# Patient Record
Sex: MALE | Race: WHITE | NOT HISPANIC OR LATINO | Employment: UNEMPLOYED | URBAN - METROPOLITAN AREA
[De-identification: names, ages, dates, MRNs, and addresses within clinical notes are randomized per-mention and may not be internally consistent; named-entity substitution may affect disease eponyms.]

---

## 2017-03-07 ENCOUNTER — HOSPITAL ENCOUNTER (EMERGENCY)
Facility: HOSPITAL | Age: 52
Discharge: HOME | End: 2017-03-07
Admitting: EMERGENCY MEDICINE
Payer: COMMERCIAL

## 2017-03-07 ENCOUNTER — APPOINTMENT (EMERGENCY)
Dept: RADIOLOGY | Facility: HOSPITAL | Age: 52
End: 2017-03-07
Payer: COMMERCIAL

## 2017-03-07 VITALS
SYSTOLIC BLOOD PRESSURE: 150 MMHG | HEART RATE: 75 BPM | DIASTOLIC BLOOD PRESSURE: 97 MMHG | TEMPERATURE: 97.8 F | RESPIRATION RATE: 18 BRPM | WEIGHT: 266 LBS | OXYGEN SATURATION: 98 %

## 2017-03-07 DIAGNOSIS — M77.8 TENDINITIS OF LEFT WRIST: Primary | ICD-10-CM

## 2017-03-07 PROCEDURE — 73110 X-RAY EXAM OF WRIST: CPT

## 2017-03-07 PROCEDURE — 99283 EMERGENCY DEPT VISIT LOW MDM: CPT

## 2017-03-07 RX ORDER — IBUPROFEN 800 MG/1
800 TABLET ORAL EVERY 6 HOURS PRN
Qty: 30 TABLET | Refills: 0 | Status: SHIPPED | OUTPATIENT
Start: 2017-03-07 | End: 2018-11-14

## 2017-03-07 RX ADMIN — IBUPROFEN 800 MG: 200 TABLET, FILM COATED ORAL at 11:24

## 2017-03-13 ENCOUNTER — OFFICE VISIT (OUTPATIENT)
Dept: URGENT CARE | Facility: MEDICAL CENTER | Age: 52
End: 2017-03-13
Payer: COMMERCIAL

## 2017-03-13 PROCEDURE — 99283 EMERGENCY DEPT VISIT LOW MDM: CPT

## 2017-03-13 PROCEDURE — G0382 LEV 3 HOSP TYPE B ED VISIT: HCPCS

## 2018-01-15 NOTE — MISCELLANEOUS
Message   Recorded as Task   Date: 02/22/2016 06:37 PM, Created By: Laxmi Bethea   Task Name: Medical Complaint Callback   Assigned To: Dunia Beebe   Regarding Patient: Rose Mary Hansen, Status: Complete   Comment:    Dunia Beebe - 22 Feb 2016 6:37 PM     TASK CREATED  [phone]  Sister called  He was at his sister Yuki Farah, and she took his BP which was 182/120  He did not want to go to ER  On his way home now  Ramirez Lo - 22 Feb 2016 7:13 PM     TASK REPLIED TO: Previously Assigned To Ramirez Lo  He had again skipped his appt w us recently for f/up and is well aware of risks assoc w this-  stroke etc    If he does not keep appts  (has miised multiple w us) I do not know what else to do for him and he may want to find another Dr who could perhaps get him to become more compliant -  w/ his history and BP that high he should be seen at ER  Dunia Beebe - 22 Feb 2016 7:40 PM     TASK EDITED  Given Dr Aubrey Longoria message and he said he would go to ER in am    Dunia Beebe - 22 Feb 2016 7:41 PM     TASK COMPLETED        Active Problems    1  Abnormal brain scan (794 09) (R94 02)   2  Asthma (493 90) (J45 909)   3  Blurry vision (368 8) (H53 8)   4  Cervical Spondylosis (C5 - C6) (721 0)   5  Depression with anxiety (300 4) (F41 8)   6  Diabetes mellitus (250 00) (E11 9)   7  Headache (784 0) (R51)   8  Herniated cervical disc (722 0) (M50 20)   9  Hypertension (401 9) (I10)   10  Hypokalemia (276 8) (E87 6)   11  Lacunar stroke (434 91) (I63 9)   12  Left inguinal hernia (550 90) (K40 90)   13  Numbness (782 0) (R20 0)   14  Peripheral neuropathy (356 9) (G62 9)   15  Postherpetic neuralgia (053 19) (B02 29)    Current Meds   1  ARIPiprazole 5 MG Oral Tablet; Take 1 tablet daily at night; Therapy: 34VDN9698 to (Last Rx:68Gnv4944) Ordered   2  Carvedilol 6 25 MG Oral Tablet; Take 1 tablet twice a day; Therapy: 71EZT1618 to (Evaluate:90Cuf8487); Last Rx:28Fvh6111 Ordered   3   Lantus 100 UNIT/ML Subcutaneous Solution; INJECT 15 UNIT Daily or as directed; Therapy: 20KJR7216 to (Last Rx:09Feb2016) Ordered   4  Lisinopril 40 MG Oral Tablet; take one tablet by mouth daily; Therapy: 58HUZ8748 to (Evaluate:64Fyd9269); Last Rx:09Feb2016 Ordered   5  LORazepam 0 5 MG Oral Tablet; TAKE 1 TABLET 4 times daily PRN anxiety; Therapy: 63FDM6212 to (Evaluate:13Nlg5697); Last Rx:09Feb2016 Ordered   6  MetFORMIN HCl - 500 MG Oral Tablet; Take 1 tablet twice daily with meals; Therapy: 42MMP7909 to (Evaluate:59Hdp5973); Last Rx:09Feb2016 Ordered   7  Ventolin  (90 Base) MCG/ACT Inhalation Aerosol Solution; INHALE 2 PUFFS   EVERY 4-6 HOURS AS NEEDED; Therapy: 86ZVJ8484 to ([phone] [phone])  Requested for: 33MSU1369; Last   Rx:23Jan2014 Ordered    Allergies    1   No Known Drug Allergies    Signatures   Electronically signed by : Marline Camarillo, ; Feb 22 2016  7:41PM EST                       (Author)

## 2018-11-14 ENCOUNTER — APPOINTMENT (EMERGENCY)
Dept: RADIOLOGY | Facility: HOSPITAL | Age: 53
DRG: 284 | End: 2018-11-14
Payer: COMMERCIAL

## 2018-11-14 ENCOUNTER — HOSPITAL ENCOUNTER (INPATIENT)
Facility: HOSPITAL | Age: 53
LOS: 3 days | Discharge: HOME | DRG: 284 | End: 2018-11-18
Attending: EMERGENCY MEDICINE | Admitting: SURGERY
Payer: COMMERCIAL

## 2018-11-14 DIAGNOSIS — K80.13 CALCULUS OF GALLBLADDER WITH ACUTE ON CHRONIC CHOLECYSTITIS WITH OBSTRUCTION: ICD-10-CM

## 2018-11-14 DIAGNOSIS — K85.90 PANCREATITIS: Primary | ICD-10-CM

## 2018-11-14 DIAGNOSIS — K85.90 ACUTE PANCREATITIS WITHOUT INFECTION OR NECROSIS, UNSPECIFIED PANCREATITIS TYPE: ICD-10-CM

## 2018-11-14 LAB
ALBUMIN SERPL BCP-MCNC: 3.8 G/DL (ref 3.5–5)
ALP SERPL-CCNC: 193 U/L (ref 46–116)
ALT SERPL W P-5'-P-CCNC: 235 U/L (ref 12–78)
AMPHETAMINES SERPL QL SCN: NEGATIVE
ANION GAP SERPL CALCULATED.3IONS-SCNC: 3 MMOL/L (ref 4–13)
AST SERPL W P-5'-P-CCNC: 233 U/L (ref 5–45)
BARBITURATES UR QL: NEGATIVE
BASOPHILS # BLD AUTO: 0.06 THOUSANDS/ΜL (ref 0–0.1)
BASOPHILS NFR BLD AUTO: 0 % (ref 0–1)
BENZODIAZ UR QL: NEGATIVE
BILIRUB SERPL-MCNC: 3.19 MG/DL (ref 0.2–1)
BILIRUB UR QL STRIP: NEGATIVE
BUN SERPL-MCNC: 20 MG/DL (ref 5–25)
CALCIUM SERPL-MCNC: 8.6 MG/DL (ref 8.3–10.1)
CHLORIDE SERPL-SCNC: 99 MMOL/L (ref 100–108)
CLARITY UR: CLEAR
CO2 SERPL-SCNC: 29 MMOL/L (ref 21–32)
COCAINE UR QL: POSITIVE
COLOR UR: YELLOW
CREAT SERPL-MCNC: 1.48 MG/DL (ref 0.6–1.3)
EOSINOPHIL # BLD AUTO: 0.14 THOUSAND/ΜL (ref 0–0.61)
EOSINOPHIL NFR BLD AUTO: 1 % (ref 0–6)
ERYTHROCYTE [DISTWIDTH] IN BLOOD BY AUTOMATED COUNT: 12.2 % (ref 11.6–15.1)
GFR SERPL CREATININE-BSD FRML MDRD: 53 ML/MIN/1.73SQ M
GLUCOSE SERPL-MCNC: 256 MG/DL (ref 65–140)
GLUCOSE UR STRIP-MCNC: ABNORMAL MG/DL
HCT VFR BLD AUTO: 49.1 % (ref 36.5–49.3)
HGB BLD-MCNC: 17 G/DL (ref 12–17)
HGB UR QL STRIP.AUTO: NEGATIVE
IMM GRANULOCYTES # BLD AUTO: 0.07 THOUSAND/UL (ref 0–0.2)
IMM GRANULOCYTES NFR BLD AUTO: 1 % (ref 0–2)
KETONES UR STRIP-MCNC: NEGATIVE MG/DL
LEUKOCYTE ESTERASE UR QL STRIP: NEGATIVE
LIPASE SERPL-CCNC: ABNORMAL U/L (ref 73–393)
LYMPHOCYTES # BLD AUTO: 1.85 THOUSANDS/ΜL (ref 0.6–4.47)
LYMPHOCYTES NFR BLD AUTO: 12 % (ref 14–44)
MCH RBC QN AUTO: 30.5 PG (ref 26.8–34.3)
MCHC RBC AUTO-ENTMCNC: 34.6 G/DL (ref 31.4–37.4)
MCV RBC AUTO: 88 FL (ref 82–98)
METHADONE UR QL: NEGATIVE
MONOCYTES # BLD AUTO: 1.08 THOUSAND/ΜL (ref 0.17–1.22)
MONOCYTES NFR BLD AUTO: 7 % (ref 4–12)
NEUTROPHILS # BLD AUTO: 11.89 THOUSANDS/ΜL (ref 1.85–7.62)
NEUTS SEG NFR BLD AUTO: 79 % (ref 43–75)
NITRITE UR QL STRIP: NEGATIVE
NRBC BLD AUTO-RTO: 0 /100 WBCS
OPIATES UR QL SCN: POSITIVE
PCP UR QL: NEGATIVE
PH UR STRIP.AUTO: 5.5 [PH] (ref 4.5–8)
PLATELET # BLD AUTO: 296 THOUSANDS/UL (ref 149–390)
PMV BLD AUTO: 9.4 FL (ref 8.9–12.7)
POTASSIUM SERPL-SCNC: 4 MMOL/L (ref 3.5–5.3)
PROT SERPL-MCNC: 7.7 G/DL (ref 6.4–8.2)
PROT UR STRIP-MCNC: NEGATIVE MG/DL
RBC # BLD AUTO: 5.58 MILLION/UL (ref 3.88–5.62)
SODIUM SERPL-SCNC: 131 MMOL/L (ref 136–145)
SP GR UR STRIP.AUTO: 1.01 (ref 1–1.03)
THC UR QL: NEGATIVE
TROPONIN I SERPL-MCNC: <0.02 NG/ML
UROBILINOGEN UR QL STRIP.AUTO: 0.2 E.U./DL
WBC # BLD AUTO: 15.09 THOUSAND/UL (ref 4.31–10.16)

## 2018-11-14 PROCEDURE — 74177 CT ABD & PELVIS W/CONTRAST: CPT

## 2018-11-14 PROCEDURE — 36415 COLL VENOUS BLD VENIPUNCTURE: CPT

## 2018-11-14 PROCEDURE — 96361 HYDRATE IV INFUSION ADD-ON: CPT

## 2018-11-14 PROCEDURE — 71275 CT ANGIOGRAPHY CHEST: CPT

## 2018-11-14 PROCEDURE — 96374 THER/PROPH/DIAG INJ IV PUSH: CPT

## 2018-11-14 PROCEDURE — 81003 URINALYSIS AUTO W/O SCOPE: CPT

## 2018-11-14 PROCEDURE — 80307 DRUG TEST PRSMV CHEM ANLYZR: CPT | Performed by: EMERGENCY MEDICINE

## 2018-11-14 PROCEDURE — 85730 THROMBOPLASTIN TIME PARTIAL: CPT | Performed by: EMERGENCY MEDICINE

## 2018-11-14 PROCEDURE — 83690 ASSAY OF LIPASE: CPT | Performed by: EMERGENCY MEDICINE

## 2018-11-14 PROCEDURE — 84484 ASSAY OF TROPONIN QUANT: CPT | Performed by: EMERGENCY MEDICINE

## 2018-11-14 PROCEDURE — 80053 COMPREHEN METABOLIC PANEL: CPT | Performed by: EMERGENCY MEDICINE

## 2018-11-14 PROCEDURE — 85610 PROTHROMBIN TIME: CPT | Performed by: EMERGENCY MEDICINE

## 2018-11-14 PROCEDURE — 96375 TX/PRO/DX INJ NEW DRUG ADDON: CPT

## 2018-11-14 PROCEDURE — 93005 ELECTROCARDIOGRAM TRACING: CPT

## 2018-11-14 PROCEDURE — 99285 EMERGENCY DEPT VISIT HI MDM: CPT

## 2018-11-14 PROCEDURE — 96360 HYDRATION IV INFUSION INIT: CPT

## 2018-11-14 PROCEDURE — 85025 COMPLETE CBC W/AUTO DIFF WBC: CPT | Performed by: EMERGENCY MEDICINE

## 2018-11-14 RX ORDER — CARVEDILOL 25 MG/1
50 TABLET ORAL 2 TIMES DAILY WITH MEALS
COMMUNITY

## 2018-11-14 RX ORDER — ISOSORBIDE MONONITRATE 60 MG/1
60 TABLET, EXTENDED RELEASE ORAL DAILY
COMMUNITY

## 2018-11-14 RX ORDER — ASPIRIN 81 MG/1
81 TABLET ORAL DAILY
COMMUNITY

## 2018-11-14 RX ORDER — ATORVASTATIN CALCIUM 80 MG/1
80 TABLET, FILM COATED ORAL
COMMUNITY

## 2018-11-14 RX ORDER — AMLODIPINE BESYLATE 10 MG/1
10 TABLET ORAL DAILY
COMMUNITY

## 2018-11-14 RX ORDER — SPIRONOLACTONE 50 MG/1
50 TABLET, FILM COATED ORAL DAILY
COMMUNITY

## 2018-11-14 RX ORDER — LISINOPRIL 40 MG/1
40 TABLET ORAL DAILY
COMMUNITY

## 2018-11-14 RX ORDER — ONDANSETRON 2 MG/ML
4 INJECTION INTRAMUSCULAR; INTRAVENOUS ONCE
Status: COMPLETED | OUTPATIENT
Start: 2018-11-14 | End: 2018-11-14

## 2018-11-14 RX ORDER — OMEPRAZOLE 20 MG/1
20 CAPSULE, DELAYED RELEASE ORAL DAILY
COMMUNITY
End: 2021-12-27 | Stop reason: CLARIF

## 2018-11-14 RX ORDER — MIRTAZAPINE 15 MG/1
15 TABLET, FILM COATED ORAL
COMMUNITY

## 2018-11-14 RX ORDER — HYDROMORPHONE HCL/PF 1 MG/ML
1 SYRINGE (ML) INJECTION ONCE
Status: COMPLETED | OUTPATIENT
Start: 2018-11-14 | End: 2018-11-14

## 2018-11-14 RX ORDER — GLYBURIDE 5 MG/1
5 TABLET ORAL
COMMUNITY

## 2018-11-14 RX ORDER — ONDANSETRON 2 MG/ML
4 INJECTION INTRAMUSCULAR; INTRAVENOUS ONCE
Status: DISCONTINUED | OUTPATIENT
Start: 2018-11-15 | End: 2018-11-15

## 2018-11-14 RX ORDER — ALBUTEROL SULFATE 90 UG/1
1 AEROSOL, METERED RESPIRATORY (INHALATION) AS NEEDED
Status: ON HOLD | COMMUNITY
End: 2018-12-18 | Stop reason: HOSPADM

## 2018-11-14 RX ADMIN — SODIUM CHLORIDE 1000 ML: 0.9 INJECTION, SOLUTION INTRAVENOUS at 22:27

## 2018-11-14 RX ADMIN — SODIUM CHLORIDE 1000 ML: 0.9 INJECTION, SOLUTION INTRAVENOUS at 23:44

## 2018-11-14 RX ADMIN — HYDROMORPHONE HYDROCHLORIDE 1 MG: 1 INJECTION, SOLUTION INTRAMUSCULAR; INTRAVENOUS; SUBCUTANEOUS at 23:44

## 2018-11-14 RX ADMIN — IOHEXOL 100 ML: 350 INJECTION, SOLUTION INTRAVENOUS at 22:43

## 2018-11-14 RX ADMIN — ONDANSETRON 4 MG: 2 INJECTION INTRAMUSCULAR; INTRAVENOUS at 22:27

## 2018-11-15 ENCOUNTER — APPOINTMENT (INPATIENT)
Dept: RADIOLOGY | Facility: HOSPITAL | Age: 53
DRG: 284 | End: 2018-11-15
Payer: COMMERCIAL

## 2018-11-15 PROBLEM — K85.90 ACUTE PANCREATITIS: Status: ACTIVE | Noted: 2018-11-15

## 2018-11-15 LAB
ABO GROUP BLD: NORMAL
ALBUMIN SERPL BCP-MCNC: 3 G/DL (ref 3.5–5)
ALP SERPL-CCNC: 157 U/L (ref 46–116)
ALT SERPL W P-5'-P-CCNC: 230 U/L (ref 12–78)
ANION GAP SERPL CALCULATED.3IONS-SCNC: 2 MMOL/L (ref 4–13)
APTT PPP: 31 SECONDS (ref 26–38)
AST SERPL W P-5'-P-CCNC: 167 U/L (ref 5–45)
ATRIAL RATE: 120 BPM
BASOPHILS # BLD AUTO: 0.04 THOUSANDS/ΜL (ref 0–0.1)
BASOPHILS NFR BLD AUTO: 1 % (ref 0–1)
BILIRUB SERPL-MCNC: 0.88 MG/DL (ref 0.2–1)
BLD GP AB SCN SERPL QL: NEGATIVE
BUN SERPL-MCNC: 16 MG/DL (ref 5–25)
CALCIUM SERPL-MCNC: 7.9 MG/DL (ref 8.3–10.1)
CHLORIDE SERPL-SCNC: 105 MMOL/L (ref 100–108)
CO2 SERPL-SCNC: 30 MMOL/L (ref 21–32)
CREAT SERPL-MCNC: 1.42 MG/DL (ref 0.6–1.3)
EOSINOPHIL # BLD AUTO: 0.21 THOUSAND/ΜL (ref 0–0.61)
EOSINOPHIL NFR BLD AUTO: 3 % (ref 0–6)
ERYTHROCYTE [DISTWIDTH] IN BLOOD BY AUTOMATED COUNT: 12.4 % (ref 11.6–15.1)
GFR SERPL CREATININE-BSD FRML MDRD: 55 ML/MIN/1.73SQ M
GLUCOSE SERPL-MCNC: 123 MG/DL (ref 65–140)
GLUCOSE SERPL-MCNC: 138 MG/DL (ref 65–140)
GLUCOSE SERPL-MCNC: 151 MG/DL (ref 65–140)
GLUCOSE SERPL-MCNC: 187 MG/DL (ref 65–140)
GLUCOSE SERPL-MCNC: 199 MG/DL (ref 65–140)
HCT VFR BLD AUTO: 42.9 % (ref 36.5–49.3)
HGB BLD-MCNC: 15 G/DL (ref 12–17)
IMM GRANULOCYTES # BLD AUTO: 0.01 THOUSAND/UL (ref 0–0.2)
IMM GRANULOCYTES NFR BLD AUTO: 0 % (ref 0–2)
INR PPP: 0.99 (ref 0.86–1.17)
LIPASE SERPL-CCNC: 7459 U/L (ref 73–393)
LYMPHOCYTES # BLD AUTO: 2.19 THOUSANDS/ΜL (ref 0.6–4.47)
LYMPHOCYTES NFR BLD AUTO: 31 % (ref 14–44)
MAGNESIUM SERPL-MCNC: 2 MG/DL (ref 1.6–2.6)
MCH RBC QN AUTO: 30.7 PG (ref 26.8–34.3)
MCHC RBC AUTO-ENTMCNC: 35 G/DL (ref 31.4–37.4)
MCV RBC AUTO: 88 FL (ref 82–98)
MONOCYTES # BLD AUTO: 0.63 THOUSAND/ΜL (ref 0.17–1.22)
MONOCYTES NFR BLD AUTO: 9 % (ref 4–12)
NEUTROPHILS # BLD AUTO: 3.99 THOUSANDS/ΜL (ref 1.85–7.62)
NEUTS SEG NFR BLD AUTO: 56 % (ref 43–75)
NRBC BLD AUTO-RTO: 0 /100 WBCS
P AXIS: 48 DEGREES
PHOSPHATE SERPL-MCNC: 3.2 MG/DL (ref 2.7–4.5)
PLATELET # BLD AUTO: 269 THOUSANDS/UL (ref 149–390)
PMV BLD AUTO: 9.6 FL (ref 8.9–12.7)
POTASSIUM SERPL-SCNC: 3.7 MMOL/L (ref 3.5–5.3)
PR INTERVAL: 160 MS
PROT SERPL-MCNC: 6.3 G/DL (ref 6.4–8.2)
PROTHROMBIN TIME: 13.2 SECONDS (ref 11.8–14.2)
QRS AXIS: -73 DEGREES
QRSD INTERVAL: 94 MS
QT INTERVAL: 334 MS
QTC INTERVAL: 469 MS
RBC # BLD AUTO: 4.88 MILLION/UL (ref 3.88–5.62)
RH BLD: POSITIVE
SODIUM SERPL-SCNC: 137 MMOL/L (ref 136–145)
SPECIMEN EXPIRATION DATE: NORMAL
T WAVE AXIS: 54 DEGREES
VENTRICULAR RATE: 119 BPM
WBC # BLD AUTO: 7.07 THOUSAND/UL (ref 4.31–10.16)

## 2018-11-15 PROCEDURE — 84100 ASSAY OF PHOSPHORUS: CPT | Performed by: STUDENT IN AN ORGANIZED HEALTH CARE EDUCATION/TRAINING PROGRAM

## 2018-11-15 PROCEDURE — 82948 REAGENT STRIP/BLOOD GLUCOSE: CPT

## 2018-11-15 PROCEDURE — 83735 ASSAY OF MAGNESIUM: CPT | Performed by: STUDENT IN AN ORGANIZED HEALTH CARE EDUCATION/TRAINING PROGRAM

## 2018-11-15 PROCEDURE — 83690 ASSAY OF LIPASE: CPT | Performed by: STUDENT IN AN ORGANIZED HEALTH CARE EDUCATION/TRAINING PROGRAM

## 2018-11-15 PROCEDURE — 36415 COLL VENOUS BLD VENIPUNCTURE: CPT | Performed by: EMERGENCY MEDICINE

## 2018-11-15 PROCEDURE — 99254 IP/OBS CNSLTJ NEW/EST MOD 60: CPT | Performed by: INTERNAL MEDICINE

## 2018-11-15 PROCEDURE — 85025 COMPLETE CBC W/AUTO DIFF WBC: CPT | Performed by: STUDENT IN AN ORGANIZED HEALTH CARE EDUCATION/TRAINING PROGRAM

## 2018-11-15 PROCEDURE — 93010 ELECTROCARDIOGRAM REPORT: CPT | Performed by: INTERNAL MEDICINE

## 2018-11-15 PROCEDURE — 96361 HYDRATE IV INFUSION ADD-ON: CPT

## 2018-11-15 PROCEDURE — 86850 RBC ANTIBODY SCREEN: CPT | Performed by: EMERGENCY MEDICINE

## 2018-11-15 PROCEDURE — 76705 ECHO EXAM OF ABDOMEN: CPT

## 2018-11-15 PROCEDURE — 80053 COMPREHEN METABOLIC PANEL: CPT | Performed by: STUDENT IN AN ORGANIZED HEALTH CARE EDUCATION/TRAINING PROGRAM

## 2018-11-15 PROCEDURE — 86901 BLOOD TYPING SEROLOGIC RH(D): CPT | Performed by: EMERGENCY MEDICINE

## 2018-11-15 PROCEDURE — 99223 1ST HOSP IP/OBS HIGH 75: CPT | Performed by: SURGERY

## 2018-11-15 PROCEDURE — 86900 BLOOD TYPING SEROLOGIC ABO: CPT | Performed by: EMERGENCY MEDICINE

## 2018-11-15 RX ORDER — AMLODIPINE BESYLATE 10 MG/1
10 TABLET ORAL DAILY
Status: DISCONTINUED | OUTPATIENT
Start: 2018-11-15 | End: 2018-11-18 | Stop reason: HOSPADM

## 2018-11-15 RX ORDER — SODIUM CHLORIDE 9 MG/ML
150 INJECTION, SOLUTION INTRAVENOUS CONTINUOUS
Status: DISCONTINUED | OUTPATIENT
Start: 2018-11-15 | End: 2018-11-16

## 2018-11-15 RX ORDER — MIRTAZAPINE 15 MG/1
15 TABLET, FILM COATED ORAL
Status: DISCONTINUED | OUTPATIENT
Start: 2018-11-15 | End: 2018-11-18 | Stop reason: HOSPADM

## 2018-11-15 RX ORDER — ACETAMINOPHEN 325 MG/1
650 TABLET ORAL EVERY 6 HOURS PRN
Status: DISCONTINUED | OUTPATIENT
Start: 2018-11-15 | End: 2018-11-18 | Stop reason: HOSPADM

## 2018-11-15 RX ORDER — ONDANSETRON 2 MG/ML
4 INJECTION INTRAMUSCULAR; INTRAVENOUS EVERY 6 HOURS PRN
Status: DISCONTINUED | OUTPATIENT
Start: 2018-11-15 | End: 2018-11-18 | Stop reason: HOSPADM

## 2018-11-15 RX ORDER — AMOXICILLIN 250 MG
1 CAPSULE ORAL
Status: DISCONTINUED | OUTPATIENT
Start: 2018-11-15 | End: 2018-11-18 | Stop reason: HOSPADM

## 2018-11-15 RX ORDER — ATORVASTATIN CALCIUM 40 MG/1
40 TABLET, FILM COATED ORAL
Status: DISCONTINUED | OUTPATIENT
Start: 2018-11-15 | End: 2018-11-18 | Stop reason: HOSPADM

## 2018-11-15 RX ORDER — ISOSORBIDE MONONITRATE 60 MG/1
60 TABLET, EXTENDED RELEASE ORAL DAILY
Status: DISCONTINUED | OUTPATIENT
Start: 2018-11-15 | End: 2018-11-18 | Stop reason: HOSPADM

## 2018-11-15 RX ORDER — OXYCODONE HYDROCHLORIDE 10 MG/1
10 TABLET ORAL EVERY 4 HOURS PRN
Status: DISCONTINUED | OUTPATIENT
Start: 2018-11-15 | End: 2018-11-18 | Stop reason: HOSPADM

## 2018-11-15 RX ORDER — HYDROMORPHONE HCL/PF 1 MG/ML
0.5 SYRINGE (ML) INJECTION
Status: DISCONTINUED | OUTPATIENT
Start: 2018-11-15 | End: 2018-11-16

## 2018-11-15 RX ORDER — LISINOPRIL 20 MG/1
40 TABLET ORAL 2 TIMES DAILY
Status: DISCONTINUED | OUTPATIENT
Start: 2018-11-15 | End: 2018-11-18 | Stop reason: HOSPADM

## 2018-11-15 RX ORDER — HEPARIN SODIUM 5000 [USP'U]/ML
5000 INJECTION, SOLUTION INTRAVENOUS; SUBCUTANEOUS EVERY 8 HOURS SCHEDULED
Status: DISCONTINUED | OUTPATIENT
Start: 2018-11-15 | End: 2018-11-18 | Stop reason: HOSPADM

## 2018-11-15 RX ORDER — ASPIRIN 81 MG/1
81 TABLET ORAL DAILY
Status: DISCONTINUED | OUTPATIENT
Start: 2018-11-15 | End: 2018-11-18 | Stop reason: HOSPADM

## 2018-11-15 RX ORDER — PANTOPRAZOLE SODIUM 40 MG/1
40 TABLET, DELAYED RELEASE ORAL
Status: DISCONTINUED | OUTPATIENT
Start: 2018-11-15 | End: 2018-11-18 | Stop reason: HOSPADM

## 2018-11-15 RX ORDER — CARVEDILOL 12.5 MG/1
12.5 TABLET ORAL 2 TIMES DAILY WITH MEALS
Status: DISCONTINUED | OUTPATIENT
Start: 2018-11-15 | End: 2018-11-18 | Stop reason: HOSPADM

## 2018-11-15 RX ORDER — OXYCODONE HYDROCHLORIDE 5 MG/1
5 TABLET ORAL EVERY 4 HOURS PRN
Status: DISCONTINUED | OUTPATIENT
Start: 2018-11-15 | End: 2018-11-18 | Stop reason: HOSPADM

## 2018-11-15 RX ORDER — ALBUTEROL SULFATE 90 UG/1
1 AEROSOL, METERED RESPIRATORY (INHALATION) AS NEEDED
Status: DISCONTINUED | OUTPATIENT
Start: 2018-11-15 | End: 2018-11-18 | Stop reason: HOSPADM

## 2018-11-15 RX ADMIN — HEPARIN SODIUM 5000 UNITS: 5000 INJECTION INTRAVENOUS; SUBCUTANEOUS at 13:51

## 2018-11-15 RX ADMIN — HEPARIN SODIUM 5000 UNITS: 5000 INJECTION INTRAVENOUS; SUBCUTANEOUS at 22:29

## 2018-11-15 RX ADMIN — SODIUM CHLORIDE 150 ML/HR: 0.9 INJECTION, SOLUTION INTRAVENOUS at 02:30

## 2018-11-15 RX ADMIN — ATORVASTATIN CALCIUM 40 MG: 40 TABLET, FILM COATED ORAL at 22:29

## 2018-11-15 RX ADMIN — MIRTAZAPINE 15 MG: 15 TABLET, FILM COATED ORAL at 03:10

## 2018-11-15 RX ADMIN — OXYCODONE HYDROCHLORIDE 10 MG: 10 TABLET ORAL at 02:34

## 2018-11-15 RX ADMIN — LISINOPRIL 40 MG: 20 TABLET ORAL at 18:08

## 2018-11-15 RX ADMIN — CEFAZOLIN SODIUM 2000 MG: 2 SOLUTION INTRAVENOUS at 10:44

## 2018-11-15 RX ADMIN — OXYCODONE HYDROCHLORIDE 10 MG: 10 TABLET ORAL at 09:21

## 2018-11-15 RX ADMIN — METRONIDAZOLE 500 MG: 500 INJECTION, SOLUTION INTRAVENOUS at 02:33

## 2018-11-15 RX ADMIN — SENNOSIDES AND DOCUSATE SODIUM 1 TABLET: 8.6; 5 TABLET ORAL at 22:29

## 2018-11-15 RX ADMIN — SODIUM CHLORIDE 150 ML/HR: 0.9 INJECTION, SOLUTION INTRAVENOUS at 16:48

## 2018-11-15 RX ADMIN — LISINOPRIL 40 MG: 20 TABLET ORAL at 09:17

## 2018-11-15 RX ADMIN — OXYCODONE HYDROCHLORIDE 5 MG: 5 TABLET ORAL at 18:07

## 2018-11-15 RX ADMIN — CARVEDILOL 12.5 MG: 12.5 TABLET, FILM COATED ORAL at 09:17

## 2018-11-15 RX ADMIN — OXYCODONE HYDROCHLORIDE 10 MG: 10 TABLET ORAL at 22:30

## 2018-11-15 RX ADMIN — CARVEDILOL 12.5 MG: 12.5 TABLET, FILM COATED ORAL at 16:43

## 2018-11-15 RX ADMIN — HEPARIN SODIUM 5000 UNITS: 5000 INJECTION INTRAVENOUS; SUBCUTANEOUS at 05:15

## 2018-11-15 RX ADMIN — PANTOPRAZOLE SODIUM 40 MG: 40 TABLET, DELAYED RELEASE ORAL at 05:15

## 2018-11-15 RX ADMIN — HYDROMORPHONE HYDROCHLORIDE 0.5 MG: 1 INJECTION, SOLUTION INTRAMUSCULAR; INTRAVENOUS; SUBCUTANEOUS at 15:41

## 2018-11-15 RX ADMIN — INSULIN LISPRO 2 UNITS: 100 INJECTION, SOLUTION INTRAVENOUS; SUBCUTANEOUS at 03:11

## 2018-11-15 RX ADMIN — CEFAZOLIN SODIUM 2000 MG: 2 SOLUTION INTRAVENOUS at 03:18

## 2018-11-15 RX ADMIN — AMLODIPINE BESYLATE 10 MG: 10 TABLET ORAL at 09:17

## 2018-11-15 RX ADMIN — ASPIRIN 81 MG: 81 TABLET, COATED ORAL at 09:17

## 2018-11-15 RX ADMIN — ISOSORBIDE MONONITRATE 60 MG: 60 TABLET, EXTENDED RELEASE ORAL at 09:17

## 2018-11-15 RX ADMIN — SODIUM CHLORIDE 150 ML/HR: 0.9 INJECTION, SOLUTION INTRAVENOUS at 09:19

## 2018-11-15 RX ADMIN — CEFAZOLIN SODIUM 2000 MG: 2 SOLUTION INTRAVENOUS at 20:51

## 2018-11-15 RX ADMIN — HYDROMORPHONE HYDROCHLORIDE 0.5 MG: 1 INJECTION, SOLUTION INTRAMUSCULAR; INTRAVENOUS; SUBCUTANEOUS at 05:15

## 2018-11-15 RX ADMIN — METRONIDAZOLE 500 MG: 500 INJECTION, SOLUTION INTRAVENOUS at 18:11

## 2018-11-15 RX ADMIN — METRONIDAZOLE 500 MG: 500 INJECTION, SOLUTION INTRAVENOUS at 09:34

## 2018-11-15 RX ADMIN — MIRTAZAPINE 15 MG: 15 TABLET, FILM COATED ORAL at 22:29

## 2018-11-15 RX ADMIN — HYDROMORPHONE HYDROCHLORIDE 0.5 MG: 1 INJECTION, SOLUTION INTRAMUSCULAR; INTRAVENOUS; SUBCUTANEOUS at 11:56

## 2018-11-15 RX ADMIN — OXYCODONE HYDROCHLORIDE 10 MG: 10 TABLET ORAL at 13:55

## 2018-11-15 RX ADMIN — ACETAMINOPHEN 650 MG: 325 TABLET, FILM COATED ORAL at 02:34

## 2018-11-15 NOTE — ED PROVIDER NOTES
History  Chief Complaint   Patient presents with    Chest Pain     pt c/o chest pain starting around 1130 that has subsided currently  states he now has RLQ pain with vomiting x4 today  denies SOB  reports similar problems last saturday where he had a stress test and was told he did not need futher intervention    Abdominal Pain     52year-old man presents for evaluation of chest pain  Patient reports acute onset of right-sided chest pain at 11:30 AM today without inciting trauma  Pain persisted throughout the day with development of nausea and 4 episodes of non-bloody vomiting prompting him to come in for evaluation  He is now complaining primarily of a RLQ abdominal pain  No fevers, chills, diarrhea, constipation, or urinary symptoms  He previously had a hernia repair without other abdominal surgeries  Patient was discharged from [facility] recently for hypertensive emergency with global hypokinesis and reversible ischemia on stress test  He reports a catheterization at that time without stent placement  He denies smoking and reports previous cocaine use, last in February  He has a history of hypertension and hyperlipidemia  No history of VTE  On arrival, patient is hypertensive to the 571H systolic and tachycardic to the 120s with otherwise normal vital signs  Physical exam shows mild tenderness in the epigastric region and RLQ without peritonitis  Given reported chest pain, abdominal pain, and significant hypertension/tachycardia, will evaluate for possible dissection vs VTE with CT chest/abdomen  Will perform cardiac workup, abdominal labs, and urine dip  Will administer IV fluids, Zofran, and reassess  Prior to Admission Medications   Prescriptions Last Dose Informant Patient Reported? Taking?    albuterol (PROVENTIL HFA,VENTOLIN HFA) 90 mcg/act inhaler 11/14/2018 at Unknown time  Yes Yes   Sig: Inhale 1 puff as needed for wheezing   amLODIPine (NORVASC) 10 mg tablet 11/14/2018 at Unknown time  Yes Yes Sig: Take 10 mg by mouth daily   apixaban (ELIQUIS) 5 mg 11/14/2018 at Unknown time  Yes Yes   Sig: Take 5 mg by mouth 2 (two) times a day   aspirin (ECOTRIN LOW STRENGTH) 81 mg EC tablet 11/14/2018 at Unknown time  Yes Yes   Sig: Take 81 mg by mouth daily   atorvastatin (LIPITOR) 40 mg tablet 11/13/2018 at Unknown time  Yes Yes   Sig: Take 40 mg by mouth daily at bedtime   carvedilol (COREG) 12 5 mg tablet 11/14/2018 at Unknown time  Yes Yes   Sig: Take 12 5 mg by mouth 2 (two) times a day with meals   glyBURIDE (DIABETA) 5 mg tablet 11/14/2018 at Unknown time  Yes Yes   Sig: Take 5 mg by mouth daily with breakfast   isosorbide mononitrate (IMDUR) 30 mg 24 hr tablet 11/14/2018 at Unknown time  Yes Yes   Sig: Take 60 mg by mouth daily   lisinopril (ZESTRIL) 40 mg tablet 11/14/2018 at Unknown time  Yes Yes   Sig: Take 40 mg by mouth 2 (two) times a day   metFORMIN (GLUCOPHAGE) 1000 MG tablet 11/14/2018 at Unknown time  Yes Yes   Sig: Take 1,000 mg by mouth 2 (two) times a day with meals   mirtazapine (REMERON) 15 mg tablet 11/13/2018 at Unknown time  Yes Yes   Sig: Take 15 mg by mouth daily at bedtime   omeprazole (PriLOSEC) 20 mg delayed release capsule 11/14/2018 at Unknown time  Yes Yes   Sig: Take 20 mg by mouth daily   spironolactone (ALDACTONE) 25 mg tablet 11/14/2018 at Unknown time  Yes Yes   Sig: Take 25 mg by mouth daily      Facility-Administered Medications: None       Past Medical History:   Diagnosis Date    CAD (coronary artery disease)     Hypertension        Past Surgical History:   Procedure Laterality Date    HERNIA REPAIR         History reviewed  No pertinent family history  I have reviewed and agree with the history as documented  Social History   Substance Use Topics    Smoking status: Never Smoker    Smokeless tobacco: Never Used    Alcohol use Yes      Comment: social        Review of Systems   Constitutional: Negative for chills and fever     HENT: Negative for rhinorrhea and sore throat  Eyes: Negative for photophobia and visual disturbance  Respiratory: Negative for cough and shortness of breath  Cardiovascular: Positive for chest pain  Negative for leg swelling  Gastrointestinal: Positive for nausea and vomiting  Negative for abdominal pain and diarrhea  Genitourinary: Negative for dysuria, frequency and hematuria  Musculoskeletal: Negative for back pain and neck pain  Skin: Negative for rash and wound  Neurological: Negative for light-headedness and headaches  Physical Exam  ED Triage Vitals   Temperature Pulse Respirations Blood Pressure SpO2   11/14/18 2103 11/14/18 2103 11/14/18 2103 11/14/18 2103 11/14/18 2103   (!) 97 3 °F (36 3 °C) (!) 118 18 (!) 213/146 95 %      Temp Source Heart Rate Source Patient Position - Orthostatic VS BP Location FiO2 (%)   11/14/18 2103 11/14/18 2209 11/14/18 2209 11/14/18 2209 --   Oral Monitor Lying Right arm       Pain Score       11/14/18 2103       6           Orthostatic Vital Signs  Vitals:    11/15/18 0452 11/15/18 0545 11/15/18 0729 11/15/18 1100   BP: (!) 188/96 126/75 129/78 111/72   Pulse: 71 66 67 74   Patient Position - Orthostatic VS: Lying  Lying Lying       Physical Exam   Constitutional: He is oriented to person, place, and time  He appears well-developed and well-nourished  No distress  HENT:   Head: Normocephalic and atraumatic  Eyes: Pupils are equal, round, and reactive to light  Conjunctivae are normal  No scleral icterus  Neck: Neck supple  No JVD present  Cardiovascular: Regular rhythm and normal heart sounds  Exam reveals no gallop and no friction rub  No murmur heard  Tachycardic, radial pulses 2+ and equal   Pulmonary/Chest: Effort normal and breath sounds normal  No respiratory distress  He has no wheezes  He has no rales  Abdominal: Soft  He exhibits distension  There is tenderness (Mild epigastric region and RLQ without peritonitis)  There is no rebound and no guarding  Musculoskeletal: He exhibits no edema or tenderness  Neurological: He is alert and oriented to person, place, and time  No cranial nerve deficit  GCS 15, no gross motor or sensory deficit   Skin: Skin is warm and dry  He is not diaphoretic  Psychiatric: He has a normal mood and affect  His behavior is normal  Thought content normal    Vitals reviewed        ED Medications  Medications   albuterol (PROVENTIL HFA,VENTOLIN HFA) inhaler 1 puff (not administered)   amLODIPine (NORVASC) tablet 10 mg (10 mg Oral Given 11/15/18 0917)   aspirin (ECOTRIN LOW STRENGTH) EC tablet 81 mg (81 mg Oral Given 11/15/18 0917)   atorvastatin (LIPITOR) tablet 40 mg (not administered)   carvedilol (COREG) tablet 12 5 mg (12 5 mg Oral Given 11/15/18 0917)   isosorbide mononitrate (IMDUR) 24 hr tablet 60 mg (60 mg Oral Given 11/15/18 0917)   lisinopril (ZESTRIL) tablet 40 mg (40 mg Oral Given 11/15/18 0917)   mirtazapine (REMERON) tablet 15 mg (15 mg Oral Given 11/15/18 0310)   pantoprazole (PROTONIX) EC tablet 40 mg (40 mg Oral Given 11/15/18 0515)   sodium chloride 0 9 % infusion (150 mL/hr Intravenous New Bag 11/15/18 0919)   ondansetron (ZOFRAN) injection 4 mg (not administered)   senna-docusate sodium (SENOKOT S) 8 6-50 mg per tablet 1 tablet (not administered)   heparin (porcine) subcutaneous injection 5,000 Units (5,000 Units Subcutaneous Given 11/15/18 1351)   insulin lispro (HumaLOG) 100 units/mL subcutaneous injection 2-12 Units (2 Units Subcutaneous Not Given 11/15/18 1111)   oxyCODONE (ROXICODONE) IR tablet 5 mg (not administered)   oxyCODONE (ROXICODONE) immediate release tablet 10 mg (10 mg Oral Given 11/15/18 1355)   HYDROmorphone (DILAUDID) injection 0 5 mg (0 5 mg Intravenous Given 11/15/18 1156)   acetaminophen (TYLENOL) tablet 650 mg (650 mg Oral Given 11/15/18 0234)   ceFAZolin (ANCEF) IVPB (premix) 2,000 mg (0 mg Intravenous Stopped 11/15/18 1114)   metroNIDAZOLE (FLAGYL) IVPB (premix) 500 mg (0 mg Intravenous Stopped 11/15/18 1004)    EMS REPLENISHMENT MED ( Does not apply Given to EMS 11/14/18 2241)   sodium chloride 0 9 % bolus 1,000 mL (0 mL Intravenous Stopped 11/15/18 0144)   ondansetron (ZOFRAN) injection 4 mg (4 mg Intravenous Given 11/14/18 2227)   iohexol (OMNIPAQUE) 350 MG/ML injection (MULTI-DOSE) 100 mL (100 mL Intravenous Given 11/14/18 2243)   sodium chloride 0 9 % bolus 1,000 mL (0 mL Intravenous Stopped 11/15/18 0144)   HYDROmorphone (DILAUDID) injection 1 mg (1 mg Intravenous Given 11/14/18 2344)       Diagnostic Studies  Results Reviewed     Procedure Component Value Units Date/Time    Comprehensive metabolic panel [[id number]]  (Abnormal) Collected:  11/15/18 0426    Lab Status:  Final result Specimen:  Blood from Arm, Left Updated:  11/15/18 1710     Sodium 137 mmol/L      Potassium 3 7 mmol/L      Chloride 105 mmol/L      CO2 30 mmol/L      ANION GAP 2 (L) mmol/L      BUN 16 mg/dL      Creatinine 1 42 (H) mg/dL      Glucose 187 (H) mg/dL      Calcium 7 9 (L) mg/dL       (H) U/L       (H) U/L      Alkaline Phosphatase 157 (H) U/L      Total Protein 6 3 (L) g/dL      Albumin 3 0 (L) g/dL      Total Bilirubin 0 88 mg/dL      eGFR 55 ml/min/1 73sq m     Narrative:         National Kidney Disease Education Program recommendations are as follows:  GFR calculation is accurate only with a steady state creatinine  Chronic Kidney disease less than 60 ml/min/1 73 sq  meters  Kidney failure less than 15 ml/min/1 73 sq  meters      Phosphorus [[id number]]  (Normal) Collected:  11/15/18 0426    Lab Status:  Final result Specimen:  Blood from Arm, Left Updated:  11/15/18 0508     Phosphorus 3 2 mg/dL     Magnesium [[id number]]  (Normal) Collected:  11/15/18 0426    Lab Status:  Final result Specimen:  Blood from Arm, Left Updated:  11/15/18 0508     Magnesium 2 0 mg/dL     Lipase [[id number]]  (Abnormal) Collected:  11/15/18 0426    Lab Status:  Final result Specimen:  Blood from Arm, Left Updated: 11/15/18 0508     Lipase 7,459 (H) u/L     CBC and differential [[id number]] Collected:  11/15/18 0426    Lab Status:  Final result Specimen:  Blood from Arm, Left Updated:  11/15/18 0440     WBC 7 07 Thousand/uL      RBC 4 88 Million/uL      Hemoglobin 15 0 g/dL      Hematocrit 42 9 %      MCV 88 fL      MCH 30 7 pg      MCHC 35 0 g/dL      RDW 12 4 %      MPV 9 6 fL      Platelets 849 Thousands/uL      nRBC 0 /100 WBCs      Neutrophils Relative 56 %      Immat GRANS % 0 %      Lymphocytes Relative 31 %      Monocytes Relative 9 %      Eosinophils Relative 3 %      Basophils Relative 1 %      Neutrophils Absolute 3 99 Thousands/µL      Immature Grans Absolute 0 01 Thousand/uL      Lymphocytes Absolute 2 19 Thousands/µL      Monocytes Absolute 0 63 Thousand/µL      Eosinophils Absolute 0 21 Thousand/µL      Basophils Absolute 0 04 Thousands/µL     Platelet count [[id number]] Collected:  11/15/18 0426    Lab Status:  No result Specimen:  Blood from Arm, Left     Fingerstick Glucose (POCT) [[id number]]  (Abnormal) Collected:  11/15/18 0310    Lab Status:  Final result Updated:  11/15/18 0312     POC Glucose 199 (H) mg/dl     Protime-INR [[id number]]  (Normal) Collected:  11/14/18 2106    Lab Status:  Final result Specimen:  Blood from Arm, Left Updated:  11/15/18 0012     Protime 13 2 seconds      INR 0 99    APTT [[id number]]  (Normal) Collected:  11/14/18 2106    Lab Status:  Final result Specimen:  Blood from Arm, Left Updated:  11/15/18 0012     PTT 31 seconds     Lipase [[id number]]  (Abnormal) Collected:  11/14/18 2106    Lab Status:  Final result Specimen:  Blood from Arm, Left Updated:  11/14/18 2318     Lipase >30,000 (H) u/L     Rapid drug screen, urine [[id number]]  (Abnormal) Collected:  11/14/18 2231    Lab Status:  Final result Specimen:  Urine from Urine, Clean Catch Updated:  11/14/18 2318     Amph/Meth UR Negative     Barbiturate Ur Negative     Benzodiazepine Urine Negative     Cocaine Urine Positive (A) Methadone Urine Negative     Opiate Urine Positive (A)     PCP Ur Negative     THC Urine Negative    Narrative:         Presumptive report  If requested, specimen will be sent to reference lab for confirmation  FOR MEDICAL PURPOSES ONLY  IF CONFIRMATION NEEDED PLEASE CONTACT THE LAB WITHIN 5 DAYS      Drug Screen Cutoff Levels:  AMPHETAMINE/METHAMPHETAMINES  1000 ng/mL  BARBITURATES     200 ng/mL  BENZODIAZEPINES     200 ng/mL  COCAINE      300 ng/mL  METHADONE      300 ng/mL  OPIATES      300 ng/mL  PHENCYCLIDINE     25 ng/mL  THC       50 ng/mL    POCT urinalysis dipstick [[id number]]  (Normal) Resulted:  11/14/18 2230    Lab Status:  Final result Specimen:  Urine Updated:  11/14/18 2230    ED Urine Macroscopic [[id number]]  (Abnormal) Collected:  11/14/18 2230    Lab Status:  Final result Specimen:  Urine Updated:  11/14/18 2230     Color, UA Yellow     Clarity, UA Clear     pH, UA 5 5     Leukocytes, UA Negative     Nitrite, UA Negative     Protein, UA Negative mg/dl      Glucose,  (1/10%) (A) mg/dl      Ketones, UA Negative mg/dl      Urobilinogen, UA 0 2 E U /dl      Bilirubin, UA Negative     Blood, UA Negative     Specific Gravity, UA 1 015    Narrative:       CLINITEK RESULT    Comprehensive metabolic panel [[id number]]  (Abnormal) Collected:  11/14/18 2106    Lab Status:  Final result Specimen:  Blood from Arm, Left Updated:  11/14/18 2131     Sodium 131 (L) mmol/L      Potassium 4 0 mmol/L      Chloride 99 (L) mmol/L      CO2 29 mmol/L      ANION GAP 3 (L) mmol/L      BUN 20 mg/dL      Creatinine 1 48 (H) mg/dL      Glucose 256 (H) mg/dL      Calcium 8 6 mg/dL       (H) U/L       (H) U/L      Alkaline Phosphatase 193 (H) U/L      Total Protein 7 7 g/dL      Albumin 3 8 g/dL      Total Bilirubin 3 19 (H) mg/dL      eGFR 53 ml/min/1 73sq m     Narrative:         National Kidney Disease Education Program recommendations are as follows:  GFR calculation is accurate only with a steady state creatinine  Chronic Kidney disease less than 60 ml/min/1 73 sq  meters  Kidney failure less than 15 ml/min/1 73 sq  meters  Troponin I [[id number]]  (Normal) Collected:  11/14/18 2106    Lab Status:  Final result Specimen:  Blood from Arm, Left Updated:  11/14/18 2130     Troponin I <0 02 ng/mL     CBC and differential [[id number]]  (Abnormal) Collected:  11/14/18 2106    Lab Status:  Final result Specimen:  Blood from Arm, Left Updated:  11/14/18 2115     WBC 15 09 (H) Thousand/uL      RBC 5 58 Million/uL      Hemoglobin 17 0 g/dL      Hematocrit 49 1 %      MCV 88 fL      MCH 30 5 pg      MCHC 34 6 g/dL      RDW 12 2 %      MPV 9 4 fL      Platelets 039 Thousands/uL      nRBC 0 /100 WBCs      Neutrophils Relative 79 (H) %      Immat GRANS % 1 %      Lymphocytes Relative 12 (L) %      Monocytes Relative 7 %      Eosinophils Relative 1 %      Basophils Relative 0 %      Neutrophils Absolute 11 89 (H) Thousands/µL      Immature Grans Absolute 0 07 Thousand/uL      Lymphocytes Absolute 1 85 Thousands/µL      Monocytes Absolute 1 08 Thousand/µL      Eosinophils Absolute 0 14 Thousand/µL      Basophils Absolute 0 06 Thousands/µL                  US gallbladder   Final Result by Jj Rome MD (11/15 1320)         1  Cholelithiasis with mild wall thickening  No pericholecystic fluid  Sonographic Jimenez sign cannot be adequately assessed due to patient had received pain medication  2  No sonographic evidence of choledocholithiasis  3  Simple cyst in the midpole the right kidney  Workstation performed: [id number]         CT pe study w abdomen pelvis w contrast   Final Result by Juliette Worthy DO (11/14 4648)      Cholelithiasis  Some stones appear to be in the region of the gallbladder neck/cystic duct and there is significant gallbladder wall thickening as well as some pericholecystic inflammatory changes    The findings taken together are highly suspicious for    acute cholecystitis in the appropriate conical setting  No pulmonary embolism or acute chest process is seen  4 to 5 mm nodule in the right apex (axial image 36, series 2)  Based on current Fleischner Society 2017 Guidelines on incidental pulmonary nodule, 12 month follow-up non-contrast chest CT is recommended  Right renal cyst, left-sided nephrolithiasis without hydronephrosis, and other nonacute findings as above  Findings discussed with Dr Carey Obrien by Dr Merline Whittaker at 11:37 p m  on 11/14/2018  Workstation performed: [id number]               Procedures  Procedures      Phone Consults  ED Phone Contact    ED Course  ED Course as of Nov 15 1453   Thu Nov 15, 2018   0003 I spoke with general surgery who will be down to evaluate the patient  MDM  Number of Diagnoses or Management Options  Acute pancreatitis without infection or necrosis, unspecified pancreatitis type:   Pancreatitis:   Diagnosis management comments: EKG and troponin unremarkable  Patient with significant improvement of tachycardia with IVF and pain control  Lab evaluation remarkable for leukocytosis, transaminitis, elevated total bilirubin, and lipase >30,000  CT showed thickened gallbladder wall with surrounding inflammation  No complications of pancreatitis on imaging  Patient was given Zofran and IVF without further episodes of vomiting  He was admitted to red surgery for further management      CritCare Time    Disposition  Final diagnoses:   Pancreatitis   Acute pancreatitis without infection or necrosis, unspecified pancreatitis type     Time reflects when diagnosis was documented in both MDM as applicable and the Disposition within this note     Time User Action Codes Description Comment    11/14/2018 11:49 PM Ginger Beckham Add [K85 90] Pancreatitis     11/15/2018  1:41 PM Rosalba Skaggs Add [K85 90] Acute pancreatitis without infection or necrosis, unspecified pancreatitis type     11/15/2018 1:41 PM Catrachita Semen Modify [K85 90] Acute pancreatitis without infection or necrosis, unspecified pancreatitis type       ED Disposition     None      Follow-up Information    None         Current Discharge Medication List      CONTINUE these medications which have NOT CHANGED    Details   albuterol (PROVENTIL HFA,VENTOLIN HFA) 90 mcg/act inhaler Inhale 1 puff as needed for wheezing      amLODIPine (NORVASC) 10 mg tablet Take 10 mg by mouth daily      apixaban (ELIQUIS) 5 mg Take 5 mg by mouth 2 (two) times a day      aspirin (ECOTRIN LOW STRENGTH) 81 mg EC tablet Take 81 mg by mouth daily      atorvastatin (LIPITOR) 40 mg tablet Take 40 mg by mouth daily at bedtime      carvedilol (COREG) 12 5 mg tablet Take 12 5 mg by mouth 2 (two) times a day with meals      glyBURIDE (DIABETA) 5 mg tablet Take 5 mg by mouth daily with breakfast      isosorbide mononitrate (IMDUR) 30 mg 24 hr tablet Take 60 mg by mouth daily      lisinopril (ZESTRIL) 40 mg tablet Take 40 mg by mouth 2 (two) times a day      metFORMIN (GLUCOPHAGE) 1000 MG tablet Take 1,000 mg by mouth 2 (two) times a day with meals      mirtazapine (REMERON) 15 mg tablet Take 15 mg by mouth daily at bedtime      omeprazole (PriLOSEC) 20 mg delayed release capsule Take 20 mg by mouth daily      spironolactone (ALDACTONE) 25 mg tablet Take 25 mg by mouth daily           No discharge procedures on file  ED Provider  Attending physically available and evaluated Jackelinekaren Negrete  LINDA managed the patient along with the ED Attending      Electronically Signed by         Stuart Charles MD  11/15/18 3574

## 2018-11-15 NOTE — ED NOTES
Red surgery paged regarding admission orders and pain medication per patients request       Franco Christian RN  11/15/18 9361

## 2018-11-15 NOTE — ED ATTENDING ATTESTATION
I, Sony Roberts DO, saw and evaluated the patient  I have discussed the patient with the resident/non-physician practitioner and agree with the resident's/non-physician practitioner's findings, Plan of Care, and MDM as documented in the resident's/non-physician practitioner's note, except where noted  All available labs and Radiology studies were reviewed  At this point I agree with the current assessment done in the Emergency Department  I have conducted an independent evaluation of this patient a history and physical is as follows:      Critical Care Time  CritCare Time    Procedures     52 yr old male to the ED with right sided chest pain since 11 am   No radiation  About 9pm , started with RLQ pain with some urinary urgency  No back pain  Pt adm to the hosp about 2 5 wks ago for same chest pain  Cath with 70 and 80% occlusion but did not stent after he had a stress test without sx  Told it was not cardiac and sent home wo further eval of the chest pain  Pt did have eggs prior to the pain and has hx of cardiac sx  Pain reduced now  Also, HTN and did have elevated pressures while in the hospital that seemed labile  Exm; epig tender, RUQ tender, Right CWT  Lungs: cta  EKG: Tachy wo ST changes  Plan  Eval PE, MI, dissection, GB, stone

## 2018-11-15 NOTE — PLAN OF CARE
Problem: DISCHARGE PLANNING - CARE MANAGEMENT  Goal: Discharge to post-acute care or home with appropriate resources  INTERVENTIONS:  - Conduct assessment to determine patient/family and health care team treatment goals, and need for post-acute services based on payer coverage, community resources, and patient preferences, and barriers to discharge  - Address psychosocial, clinical, and financial barriers to discharge as identified in assessment in conjunction with the patient/family and health care team  - Arrange appropriate level of post-acute services according to patient's   needs and preference and payer coverage in collaboration with the physician and health care team  - Communicate with and update the patient/family, physician, and health care team regarding progress on the discharge plan  - Arrange appropriate transportation to post-acute venues    Plan home with sister transporting and no [facility] needs at this time     Outcome: Progressing

## 2018-11-15 NOTE — CONSULTS
Consultation - Cardiology Team One  Mike Rogers 52 y o  male MRN: [id number]  Unit/Bed#: [facility] 404-01 Encounter: [id number]    Inpatient consult to Cardiology  Consult performed by: Daniela Monique  Consult ordered by: Amy Arrieta        Physician Requesting Consult: Luis Brannon MD     Reason for Consult / Principal Problem: pre-op    Assessment/ Plan:    Pre-operative risk:  Patient has a known history of nonobstructive CAD with recent cardiac catheterization last month  Since then he had an exercise stress test 10 days ago without ECG evidence of ischemia and no exertional symptoms  His EKG is without ischemic changes and single troponin was negative  He can proceed with planned laparoscopic cholecystectomy at moderate risk due to his known CAD  He is on appropriate medical management  Continue beta-blocker throughout the perioperative period  Acute cholecystitis:  Likely the cause of his 2 recent bouts of chest discomfort; for lap jhon tomorrow  CAD:  Nonobstructive  No anginal symptoms  Recent extensive cardiac testing noted  Continue aspirin, statin, beta-blocker, Imdur  HTN: BP elevated on admission  Likely in setting of pain; improved currently  He has had difficult-to-control hypertension in the past associated with a CVA  Continue his current regimen which includes beta-blocker, Ace, calcium channel blocker, Imdur  History of CVA:  Patient reports to prior CVAs; 2/2018 and 7/2018  He was placed on Eliquis prior to his CVA in in July  He reportedly has a PFO on imaging at outside hospital     HLD: continue statin  History of Present Illness      HPI: Mike Rogers is a 52y o  year old male who has a history of non-obstructive CAD, HTN, HLD, DM, hx of CVA x2 with possible PFO, hx of substance abuse, likely underlying sleep apnea  He follows with cardiologist Dr Molly Golden at [facility]              Pt presented to ED last night with complaints of chest and abdominal pain  Started about 5 hours prior to presentation, started in right chest wall, sharp with radiation to right side and abdomen  Not made worse with exertion  After a few hours he developed abdominal pain as well as severe nausea and vomiting  He was brought to ED:   He was tachycardic and hypertensive on [place], EKG showing sinus tachycardia without ischemic changes  Troponin negative x1  Further workup revealed elevated LFTs and significantly elevated lipase  Patient admitted with acute cholecystitis, started on intravenous fluids and IV antibiotics, and laparoscopic cholecystectomy is planned for tomorrow in preoperative cardiac clearance was requested  Patient has a history of nonobstructive coronary artery disease; he is hospitalized [facility] 7/2018 with hypertensive urgency and CVA  He underwent a nuclear stress test that showed multiple areas of ischemia  Echocardiogram revealed LVEF 45%; he is significantly hypertension and there is question regarding his compliance with dual anti-platelet therapy thus cardiac catheterization was not done during hospitalization  His medications were adjusted and he was discharged cardiology follow-up  He ultimately underwent a cardiac catheterization 10/9/2018 that revealed:     "10/9/2018  Coronaries:  Left main: Free of significant disease  LAD: luminal irregularities in the proximal segment  Tubular 70% narrowing just after take-off of medium sized septal cascade  LCx: Proximal vessel and large proximal and mid OM1 free of significant disease  Segment of tubular 80% stenosis in distal OM1  RCA: Luminal irregularities  LVEDP: 19IQJD     Complications: None  Impressions:   Severe 2 vessel CAD  Moderate to severe systemic hypertension  Normal LV filling pressures"    Patient was not experiencing any typical anginal symptoms, S medical management was recommended for his 2 vessel CAD     Since his cardiac catheterization he was admitted to [facility] again with complaints of atypical chest pain  He underwent an exercise stress test that was negative for ischemia, he was able to exercise for 7 min and not have any chest discomfort  A repeat ultrasound revealed normal LV systolic function EF 70%  His medical therapy includes aspirin 81 mg, Eliquis 5 mg b i d  (which was placed on after a CVA and a possible PFO), atorvastatin 40 mg daily, Coreg 12 5 mg b i d , Imdur 60mg, Norvasc 10 mg daily, lisinopril 40 mg b i d     Time my exam patient reports feeling much better  His pain is more adequately controlled  He is not feeling any further nausea or vomiting  Does not do any regular exercise at home but does housework including raking leaves and climb a flight of stairs without getting any exertional chest discomfort  Is a lifelong nonsmoker reports rare alcohol use  He does have a history of substance abuse acute including cocaine, he tells me that 3 days ago he took 1 hit of "crack"  EKG reviewed personally:  11/14/2018  Sinus tachycardia    Telemetry reviewed personally:   Sinus rhythm, no significant events    Review of Systems   Constitution: Negative for decreased appetite, fever and weakness  Cardiovascular: Negative for chest pain (Resolved), dyspnea on exertion, leg swelling, orthopnea, palpitations and syncope  Respiratory: Negative for cough, shortness of breath and wheezing  Gastrointestinal: Positive for abdominal pain (Improved)  Negative for nausea and vomiting  Genitourinary: Negative for dysuria  Neurological: Negative for dizziness and light-headedness  Psychiatric/Behavioral: Negative for altered mental status  All other systems reviewed and are negative       Historical Information   Past Medical History:   Diagnosis Date    CAD (coronary artery disease)     Hypertension      Past Surgical History:   Procedure Laterality Date    HERNIA REPAIR       History   Alcohol Use    Yes     Comment: social     History   Drug Use No     History   Smoking Status    Never Smoker   Smokeless Tobacco    Never Used     Family History: History reviewed  No pertinent family history  Meds/Allergies   current meds:   Current Facility-Administered Medications   Medication Dose Route Frequency    acetaminophen (TYLENOL) tablet 650 mg  650 mg Oral Q6H PRN    albuterol (PROVENTIL HFA,VENTOLIN HFA) inhaler 1 puff  1 puff Inhalation PRN    amLODIPine (NORVASC) tablet 10 mg  10 mg Oral Daily    aspirin (ECOTRIN LOW STRENGTH) EC tablet 81 mg  81 mg Oral Daily    atorvastatin (LIPITOR) tablet 40 mg  40 mg Oral HS    carvedilol (COREG) tablet 12 5 mg  12 5 mg Oral BID With Meals    ceFAZolin (ANCEF) IVPB (premix) 2,000 mg  2,000 mg Intravenous Q8H    heparin (porcine) subcutaneous injection 5,000 Units  5,000 Units Subcutaneous Q8H [address]    HYDROmorphone (DILAUDID) injection 0 5 mg  0 5 mg Intravenous Q3H PRN    insulin lispro (HumaLOG) 100 units/mL subcutaneous injection 2-12 Units  2-12 Units Subcutaneous Q6H [address]    isosorbide mononitrate (IMDUR) 24 hr tablet 60 mg  60 mg Oral Daily    lisinopril (ZESTRIL) tablet 40 mg  40 mg Oral BID    metroNIDAZOLE (FLAGYL) IVPB (premix) 500 mg  500 mg Intravenous Q8H    mirtazapine (REMERON) tablet 15 mg  15 mg Oral HS    ondansetron (ZOFRAN) injection 4 mg  4 mg Intravenous Q6H PRN    oxyCODONE (ROXICODONE) immediate release tablet 10 mg  10 mg Oral Q4H PRN    oxyCODONE (ROXICODONE) IR tablet 5 mg  5 mg Oral Q4H PRN    pantoprazole (PROTONIX) EC tablet 40 mg  40 mg Oral Early Morning    senna-docusate sodium (SENOKOT S) 8 6-50 mg per tablet 1 tablet  1 tablet Oral HS    sodium chloride 0 9 % infusion  150 mL/hr Intravenous Continuous       sodium chloride 150 mL/hr Last Rate: 150 mL/hr (11/15/18 0919)       No Known Allergies    Objective   Vitals: Blood pressure 111/72, pulse 74, temperature 98 1 °F (36 7 °C), temperature source Oral, resp   rate 14, height 6' 2" (1 88 m), weight 123 kg (272 lb 0 8 oz), SpO2 91 % ,     Body mass index is 34 93 kg/m²  ,     Systolic (54EKK), QAN:593 , Min:111 , TK     Diastolic (37HQN), YNM:49, Min:72, Max:146      Intake/Output Summary (Last 24 hours) at 11/15/18 1408  Last data filed at 11/15/18 1114   Gross per 24 hour   Intake             2300 ml   Output              800 ml   Net             1500 ml     Weight (last 2 days)     Date/Time   Weight    11/15/18 0452  123 (272 05)    18 2103  122 (270)            Invasive Devices     Peripheral Intravenous Line            Peripheral IV 18 Left Antecubital less than 1 day    Peripheral IV 18 Left Hand less than 1 day              Physical Exam   Constitutional: He is oriented to person, place, and time  No distress  Patient is sitting up in bed in NAD alert and cooperative   HENT:   Head: Normocephalic and atraumatic  Neck: No JVD present  Cardiovascular: Normal rate and regular rhythm  Pulmonary/Chest: Effort normal and breath sounds normal  No respiratory distress  He has no wheezes  He has no rales  Lung sounds clear to auscultation, on room air   Abdominal: Soft  Musculoskeletal: He exhibits no edema  Neurological: He is alert and oriented to person, place, and time  Skin: Skin is warm and dry  He is not diaphoretic  Psychiatric: He has a normal mood and affect  His behavior is normal    Nursing note and vitals reviewed      LABORATORY RESULTS:    Results from last 7 days  Lab Units 18   TROPONIN I ng/mL <0 02     CBC with diff:   Results from last 7 days  Lab Units 11/15/18  0426 11/14/18  210   WBC Thousand/uL 7 07 15 09*   HEMOGLOBIN g/dL 15 0 17 0   HEMATOCRIT % 42 9 49 1   MCV fL 88 88   PLATELETS Thousands/uL 269 296   MCH pg 30 7 30 5   MCHC g/dL 35 0 34 6   RDW % 12 4 12 2   MPV fL 9 6 9 4   NRBC AUTO /100 WBCs 0 0     CMP:  Results from last 7 days  Lab Units 11/15/18  0426 11/14/18  210   POTASSIUM mmol/L 3 7 4 0   CHLORIDE mmol/L 105 99*   CO2 mmol/L 30 29   BUN mg/dL 16 20   CREATININE mg/dL 1 42* 1 48*   CALCIUM mg/dL 7 9* 8 6   AST U/L 167* 233*   ALT U/L 230* 235*   ALK PHOS U/L 157* 193*   EGFR ml/min/1 73sq m 55 53     BMP:  Results from last 7 days  Lab Units 11/15/18  0426 11/14/18  2106   POTASSIUM mmol/L 3 7 4 0   CHLORIDE mmol/L 105 99*   CO2 mmol/L 30 29   BUN mg/dL 16 20   CREATININE mg/dL 1 42* 1 48*   CALCIUM mg/dL 7 9* 8 6        Results from last 7 days  Lab Units 11/15/18  0426   MAGNESIUM mg/dL 2 0       Results from last 7 days  Lab Units 11/14/18  2106   INR  0 99     Imaging: I have personally reviewed pertinent reports  Us Gallbladder    Result Date: 11/15/2018  Narrative: RIGHT UPPER QUADRANT ULTRASOUND INDICATION:     Choledocholithiasis  COMPARISON:  None TECHNIQUE:   Real-time ultrasound of the right upper quadrant was performed with a curvilinear transducer with both volumetric sweeps and still imaging techniques  FINDINGS: PANCREAS:  Visualized portions of the pancreas are within normal limits  AORTA AND IVC:  Visualized portions are normal for patient age  LIVER: Size:  Within normal range  The liver measures 13 3 cm in the midclavicular line  Contour:  Surface contour is smooth  Parenchyma:  Echogenicity and echotexture are within normal limits  No evidence of suspicious mass  Limited imaging of the main portal vein shows it to be patent and hepatopetal   BILIARY: The gallbladder is normal in caliber  Mild wall thickening without pericholecystic fluid  There is a single dependent calculus without sludge  Sonographic Jimenez's sign cannot be accurately assessed because patient had recent pain medication administration, thus limiting ultrasound assessment of acute cholecystitis  No intrahepatic biliary dilatation  CBD measures 4 mm  No choledocholithiasis  KIDNEY: Right kidney measures 13 4 x 6 1 cm  There is a 4 2 x 2 8 x 2 5 cm simple cyst in the midpole  ASCITES:   None  Impression: 1  Cholelithiasis with mild wall thickening  No pericholecystic fluid  Sonographic Jimenez sign cannot be adequately assessed due to patient had received pain medication  2  No sonographic evidence of choledocholithiasis  3  Simple cyst in the midpole the right kidney  Workstation performed: [id number]     Ct Pe Study W Abdomen Pelvis W Contrast    Result Date: 11/14/2018  Narrative: CT PULMONARY ANGIOGRAM OF THE CHEST AND CT ABDOMEN AND PELVIS WITH INTRAVENOUS CONTRAST INDICATION:   Chest pain, abdominal pain, sinus tachycardia  COMPARISON:  None  TECHNIQUE:  CT examination of the chest, abdomen and pelvis was performed  Thin section CT angiographic technique was used in the chest in order to evaluate for pulmonary embolus and coronal 3D MIP postprocessing was performed on the acquisition scanner  Axial, sagittal, and coronal 2D reformatted images were created from the source data and submitted for interpretation  Radiation dose length product (DLP) for this visit:  1591 04 mGy-cm   This examination, like all CT scans performed in the [facility], was performed utilizing techniques to minimize radiation dose exposure, including the use of iterative reconstruction and automated exposure control  IV Contrast:  100 mL of iohexol (OMNIPAQUE) Enteric Contrast:  Enteric contrast was not administered  FINDINGS: CHEST PULMONARY ARTERIAL TREE:  No pulmonary embolus is seen  LUNGS:  4 to 5 mm nodule in the right apex (axial image 36, series 2); lungs otherwise grossly clear  PLEURA:  Unremarkable  HEART/AORTA:  Unremarkable for patient's age  MEDIASTINUM AND ELEAZAR:  Unremarkable  CHEST WALL AND LOWER NECK:   Unremarkable  ABDOMEN LIVER/BILIARY TREE:  Several tiny hypodensities are scattered in the liver, too small to definitively characterize but of unlikely clinical significance  Otherwise unremarkable   GALLBLADDER:  Several small stones are seen within the gallbladder lumen, some of which appear to be in the region of the gallbladder neck and cystic duct  In addition there is significant gallbladder wall thickening as well as some pericholecystic inflammatory changes  The findings taken together are highly suspicious for acute cholecystitis in the appropriate clinical setting  Some shotty lashell hepatic lymph nodes are noted as well  SPLEEN:  Unremarkable  PANCREAS:  Unremarkable ADRENAL GLANDS:  Unremarkable  KIDNEYS/URETERS:  Partially exophytic cyst in the anterior midportion of the right kidney measures approximately 4 2 cm in size  Multiple additional tiny low density lesions scattered in the bilateral kidneys are too small to definitively characterize but are of unlikely clinical significance  A tiny nonobstructing stone is seen in the lower pole of the left kidney  The bilateral kidneys otherwise appear grossly unremarkable; no hydronephrosis  STOMACH AND BOWEL:  Unremarkable  APPENDIX:  No findings to suggest appendicitis  ABDOMINOPELVIC CAVITY:  Otherwise unremarkable  VESSELS:  Mild atherosclerosis; no aortic aneurysm  PELVIS REPRODUCTIVE ORGANS:  Unremarkable for patient's age  URINARY BLADDER:  Unremarkable  ABDOMINAL WALL/INGUINAL REGIONS:  Evidence of prior right inguinal hernia repair  Small fat-containing left inguinal hernia  Otherwise grossly unremarkable  OSSEOUS STRUCTURES:  No acute fracture or destructive osseous lesion  Mild degenerative changes of the bilateral sacroiliac joints  Impression: Cholelithiasis  Some stones appear to be in the region of the gallbladder neck/cystic duct and there is significant gallbladder wall thickening as well as some pericholecystic inflammatory changes  The findings taken together are highly suspicious for acute cholecystitis in the appropriate conical setting  No pulmonary embolism or acute chest process is seen  4 to 5 mm nodule in the right apex (axial image 36, series 2)   Based on current Fleischner Society 2017 Guidelines on incidental pulmonary nodule, 12 month follow-up non-contrast chest CT is recommended  Right renal cyst, left-sided nephrolithiasis without hydronephrosis, and other nonacute findings as above  Findings discussed with Dr Sal Cade by Dr Elsa Fernandes at 11:37 p m  on 11/14/2018  Workstation performed: [id number]     Assessment  Principal Problem:    Acute pancreatitis    Thank you for allowing us to participate in this patient's care  This pt will follow up with Dr Marrian Severe at [address] once discharged  Counseling / Coordination of Care  Total floor / unit time spent today 45 minutes  Greater than 50% of total time was spent with the patient and / or family counseling and / or coordination of care  A description of the counseling / coordination of care: Review of history, current assessment, development of a plan  Code Status: Level 1 - Full Code    ** Please Note: Dragon 360 Dictation voice to text software may have been used in the creation of this document   **

## 2018-11-15 NOTE — H&P
H&P Exam - General Surgery   Rufus Vital 52 y o  male MRN: [id number]  Unit/Bed#: [facility] 404-01 Encounter: [id number]    Assessment/Plan     Assessment:  53M who presents with abdominal and chest pain, with pancreatitis and acute cholecystitis  Plan:  - admit to surgery SD2  - aggressive IVF resuscitation, monitor UOP  - prn pain control  - telemetry for tachycardia  - ancef/flagyl  - check official RUQ u/s  - trend LFT's and lipase  - hold eliquis  - lap jhon during this admission      History of Present Illness     HPI:  Rufus Vital is a 52 y o  male who presents with right-sided chest pain and epigastric/right sided abdominal pain  Patient states pain started yesterday in the afternoon and felt like a sharp pain in his right chest and abdomen that eventually came to rest in RLQ this evening  He had similar pain to this a week and a half ago and went to [facility]  It was thought to be cardiac in etiology so he was sent to the cath lab  Cath revealed 2 vessel disease with 70-80% stenoses in each, but no stent was placed during that admission secondary to normal stress test  He was discharged without further workup of pain  Associated symptoms include nausea/vomiting, fever/chills, lightheadedness, concentrated urine  He takes Eliquis for CVA/? A fib  Previous surgical history includes RIHR but no abdominal surgery  He has a history of cocaine abuse and did go to rehab years ago and has been sober until this past Monday when he used cocaine once  He does not smoke tobacco and drinks 1-2 alcoholic beverages a week  Review of Systems   Constitutional: Positive for chills and fever  HENT: Negative  Eyes: Negative  Respiratory: Negative  Negative for cough and shortness of breath  Cardiovascular: Positive for chest pain  Negative for palpitations  Gastrointestinal: Positive for abdominal pain, nausea and vomiting  Negative for blood in stool, constipation and diarrhea  Endocrine: Negative  Genitourinary: Positive for decreased urine volume  Musculoskeletal: Negative  Skin: Negative  Allergic/Immunologic: Negative  Neurological: Negative  Negative for dizziness and light-headedness  Hematological: Negative  Psychiatric/Behavioral: Negative  Historical Information   Past Medical History:   Diagnosis Date    CAD (coronary artery disease)     Hypertension      Past Surgical History:   Procedure Laterality Date    HERNIA REPAIR       Social History   History   Alcohol Use    Yes     Comment: social     History   Drug Use No     History   Smoking Status    Never Smoker   Smokeless Tobacco    Never Used     Family History: non-contributory    Meds/Allergies   all medications and allergies reviewed  No Known Allergies    Objective   First Vitals:   Blood Pressure: (!) 213/146 (11/14/18 2103)  Pulse: (!) 118 (11/14/18 2103)  Temperature: (!) 97 3 °F (36 3 °C) (11/14/18 2103)  Temp Source: Oral (11/14/18 2103)  Respirations: 18 (11/14/18 2103)  Height: 6' 2" (188 cm) (11/15/18 0452)  Weight - Scale: 122 kg (270 lb) (11/14/18 2103)  SpO2: 95 % (11/14/18 2103)    Current Vitals:   Blood Pressure: (!) 188/96 (11/15/18 0452)  Pulse: 71 (11/15/18 0452)  Temperature: 98 2 °F (36 8 °C) (11/15/18 0452)  Temp Source: Oral (11/15/18 0452)  Respirations: 18 (11/15/18 0452)  Height: 6' 2" (188 cm) (11/15/18 0452)  Weight - Scale: 123 kg (272 lb 0 8 oz) (11/15/18 0452)  SpO2: 94 % (11/15/18 0452)      Intake/Output Summary (Last 24 hours) at 11/15/18 0529  Last data filed at 11/15/18 0452   Gross per 24 hour   Intake             2150 ml   Output              400 ml   Net             1750 ml       Invasive Devices     Peripheral Intravenous Line            Peripheral IV 11/14/18 Left Antecubital less than 1 day    Peripheral IV 11/14/18 Left Hand less than 1 day                Physical Exam   Constitutional: He is oriented to person, place, and time   He appears well-developed and well-nourished  No distress  HENT:   Head: Normocephalic and atraumatic  Eyes: Pupils are equal, round, and reactive to light  EOM are normal  No scleral icterus  Neck: No JVD present  Cardiovascular: Regular rhythm and normal heart sounds  Mildly tachycardic   Pulmonary/Chest: Effort normal and breath sounds normal  No stridor  No respiratory distress  Abdominal: Soft  He exhibits no distension  There is tenderness  There is no rebound and no guarding  Tender to palpation R subcostal margin, RLQ, epigastrium   Musculoskeletal: He exhibits no edema  Neurological: He is alert and oriented to person, place, and time  No cranial nerve deficit  Skin: Skin is warm and dry  He is not diaphoretic  Psychiatric: He has a normal mood and affect  Lab Results:   CBC:   Lab Results   Component Value Date    WBC 7 07 11/15/2018    HGB 15 0 11/15/2018    HCT 42 9 11/15/2018    MCV 88 11/15/2018     11/15/2018    MCH 30 7 11/15/2018    MCHC 35 0 11/15/2018    RDW 12 4 11/15/2018    MPV 9 6 11/15/2018    NRBC 0 11/15/2018   , CMP:   Lab Results   Component Value Date    SODIUM 137 11/15/2018    K 3 7 11/15/2018     11/15/2018    CO2 30 11/15/2018    BUN 16 11/15/2018    CREATININE 1 42 (H) 11/15/2018    CALCIUM 7 9 (L) 11/15/2018     (H) 11/15/2018     (H) 11/15/2018    ALKPHOS 157 (H) 11/15/2018    EGFR 55 11/15/2018     Imaging: I have personally reviewed pertinent reports  Ct Pe Study W Abdomen Pelvis W Contrast    Result Date: 11/14/2018  Impression: Cholelithiasis  Some stones appear to be in the region of the gallbladder neck/cystic duct and there is significant gallbladder wall thickening as well as some pericholecystic inflammatory changes  The findings taken together are highly suspicious for acute cholecystitis in the appropriate conical setting  No pulmonary embolism or acute chest process is seen  4 to 5 mm nodule in the right apex (axial image 36, series 2)  Based on current Fleischner Society 2017 Guidelines on incidental pulmonary nodule, 12 month follow-up non-contrast chest CT is recommended  Right renal cyst, left-sided nephrolithiasis without hydronephrosis, and other nonacute findings as above  Findings discussed with Dr Zen Joseph by Dr Geno Watts at 11:37 p m  on 11/14/2018  Workstation performed: [id number]     EKG, Pathology, and Other Studies: I have personally reviewed pertinent reports  Code Status: Level 1 - Full Code  Advance Directive and Living Will:      Power of :    POLST:      Counseling / Coordination of Care  Total floor / unit time spent today 30 minutes  Greater than 50% of total time was spent with the patient and / or family counseling and / or coordination of care

## 2018-11-15 NOTE — ED NOTES
Surgery resident at the bedside for patient evaluation at this time        Riki Dennis RN  11/14/18 0483

## 2018-11-15 NOTE — SOCIAL WORK
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  They live in 1 story home with 5 DUARTE  Pt drives and his sister will transport him home at discharge  Only DME is his CPAP machine  He has no hx of HHC or rehab, denies mental illness  His sister is his POA and copy requested  His PCP is Dr Valery Cortes  He uses [address], [address] in [place]  CM reviewed d/c planning process including the following: identifying help at home, patient preference for d/c planning needs, Discharge Lounge, [facility] Meds to Bed program, availability of treatment team to discuss questions or concerns patient and/or family may have regarding understanding medications and recognizing signs and symptoms once discharged  CM also encouraged patient to follow up with all recommended appointments after discharge  Patient advised of importance for patient and family to participate in managing patients medical well being  Patient/caregiver received discharge checklist  Content reviewed  Patient/caregiver encouraged to participate in discharge plan of care prior to discharge home

## 2018-11-15 NOTE — UTILIZATION REVIEW
[address]  Utilization Review Department  Phone: [phone]; Fax [phone]  [email or web address]  ATTENTION: Please call with any questions or concerns to [phone]  and carefully listen to the prompts so that you are directed to the right person  Send all requests for admission clinical reviews, approved or denied determinations and any other requests to fax [phone]  All voicemails are confidential     ===================================================================    Initial Clinical Review    Admission: Date/Time/Statement: 11/15/18 @ 0200  Orders Placed This Encounter   Procedures    Inpatient Admission     Standing Status:   Standing     Number of Occurrences:   1     Order Specific Question:   Admitting Physician     Answer:   Lexus Cross     Order Specific Question:   Level of Care     Answer:   Level 2 Stepdown / HOT [14]     Order Specific Question:   Estimated length of stay     Answer:   More than 2 Midnights     Order Specific Question:   Certification     Answer:   I certify that inpatient services are medically necessary for this patient for a duration of greater than two midnights  See H&P and MD Progress Notes for additional information about the patient's course of treatment  ED: Date/Time/Mode of Arrival:   ED Arrival Information     Expected Arrival Acuity Means of Arrival Escorted By Service Admission Type    - 11/14/2018 21:00 Emergent Ambulance [facility] Emergency    Arrival Complaint    Chest Pain          Chief Complaint:   Chief Complaint   Patient presents with    Chest Pain     pt c/o chest pain starting around 1130 that has subsided currently  states he now has RLQ pain with vomiting x4 today  denies SOB  reports similar problems last saturday where he had a stress test and was told he did not need futher intervention    Abdominal Pain       History of Illness:    Nelson Galeano Sky So is a 52 y o  male who presents with right-sided chest pain and epigastric/right sided abdominal pain  Patient states pain started yesterday in the afternoon and felt like a sharp pain in his right chest and abdomen that eventually came to rest in RLQ this evening  He had similar pain to this a week and a half ago and went to [facility]  It was thought to be cardiac in etiology so he was sent to the cath lab  Cath revealed 2 vessel disease with 70-80% stenoses in each, but no stent was placed during that admission secondary to normal stress test  He was discharged without further workup of pain  Associated symptoms include nausea/vomiting, fever/chills, lightheadedness, concentrated urine  He takes Eliquis for CVA/? A fib  Previous surgical history includes RIHR but no abdominal surgery  He has a history of cocaine abuse and did go to rehab years ago and has been sober until this past Monday when he used cocaine once   He does not smoke tobacco and drinks 1-2 alcoholic beverages a week      ED Vital Signs:   ED Triage Vitals   Temperature Pulse Respirations Blood Pressure SpO2   11/14/18 2103 11/14/18 2103 11/14/18 2103 11/14/18 2103 11/14/18 2103   (!) 97 3 °F (36 3 °C) (!) 118 18 (!) 213/146 95 %      Temp Source Heart Rate Source Patient Position - Orthostatic VS BP Location FiO2 (%)   11/14/18 2103 11/14/18 2209 11/14/18 2209 11/14/18 2209 --   Oral Monitor Lying Right arm       Pain Score       11/14/18 2103       6        Wt Readings from Last 1 Encounters:   11/15/18 123 kg (272 lb 0 8 oz)     Abnormal Labs/Diagnostic Test Results:   Lipase >30,000 (H) 73 - 393 u/L     Troponin I <0 02 <=0 04 ng/mL     WBC 15 09 (H) 4 31 - 10 16 Thousand/uL      RBC 5 58 3 88 - 5 62 Million/uL     Hemoglobin 17 0 12 0 - 17 0 g/dL     Hematocrit 49 1 36 5 - 49 3 %      Sodium 131 (L) 136 - 145 mmol/L      Potassium 4 0 3 5 - 5 3 mmol/L     Chloride 99 (L) 100 - 108 mmol/L     CO2 29 21 - 32 mmol/L     ANION GAP 3 (L) 4 - 13 mmol/L     BUN 20 5 - 25 mg/dL     Creatinine 1 48 (H) 0 60 - 1 30 mg/dL     Comment: Standardized to IDMS reference method       Glucose 256 (H) 65 - 140 mg/dL     Comment:   If the patient is fasting, the ADA then defines impaired fasting glucose as > 100 mg/dL and diabetes as > or equal to 123 mg/dL  Specimen collection should occur prior to Sulfasalazine administration due to the potential for falsely depressed results  Specimen collection should occur prior to Sulfapyridine administration due to the potential for falsely elevated results  Calcium 8 6 8 3 - 10 1 mg/dL      (H) 5 - 45 U/L     Comment:   Specimen collection should occur prior to Sulfasalazine administration due to the potential for falsely depressed results   (H) 12 - 78 U/L     Comment:   Specimen collection should occur prior to Sulfasalazine and/or Sulfapyridine administration due to the potential for falsely depressed results          Alkaline Phosphatase 193 (H) 46 - 116 U/L     Total Protein 7 7 6 4 - 8 2 g/dL     Albumin 3 8 3 5 - 5 0 g/dL     Total Bilirubin 3 19 (H) 0 20 - 1 00 mg/dL     eGFR 53 ml/min/1 73sq m       Color, UA Yellow    Clarity, UA Clear    pH, UA 5 5 4 5 - 8 0     Leukocytes, UA Negative Negative     Nitrite, UA Negative Negative     Protein, UA Negative Negative mg/dl     Glucose,  (1/10%) (A) Negative mg/dl     Ketones, UA Negative Negative mg/dl     Urobilinogen, UA 0 2 0 2, 1 0 E U /dl E U /dl     Bilirubin, UA Negative Negative     Blood, UA Negative Negative     Specific Gravity, UA 1 015 1 003 - 1 030       Amph/Meth UR Negative Negative     Barbiturate Ur Negative Negative     Benzodiazepine Urine Negative Negative     Cocaine Urine Positive (A) Negative     Methadone Urine Negative Negative     Opiate Urine Positive (A) Negative     PCP Ur Negative Negative     THC Urine Negative Negative      CT abd/pel: Cholelithiasis   Some stones appear to be in the region of the gallbladder neck/cystic duct and there is significant gallbladder wall thickening as well as some pericholecystic inflammatory changes   The findings taken together are highly suspicious for acute cholecystitis in the appropriate conical setting  No pulmonary embolism or acute chest process is seen  4 to 5 mm nodule in the right apex (axial image 36, series 2)  Based on current Fleischner Society 2017 Guidelines on incidental pulmonary nodule, 12 month follow-up non-contrast chest CT is recommended  Right renal cyst, left-sided nephrolithiasis without hydronephrosis, and other nonacute findings as above  ED Treatment:   Medication Administration from 11/14/2018 2100 to 11/15/2018 0445       Date/Time Order Dose Route Action Action by Comments     11/14/2018 2241  EMS REPLENISHMENT MED 0  Does not apply Given to EMS Venancio Link RN Given to EMS by GUILLAUME Ortiz     11/15/2018 0144 sodium chloride 0 9 % bolus 1,000 mL 0 mL Intravenous Stopped Mariaelena Menezes RN      11/14/2018 2227 sodium chloride 0 9 % bolus 1,000 mL 1,000 mL Intravenous [address] [facility] VERÓNICA Rouse      11/14/2018 2227 ondansetron (ZOFRAN) injection 4 mg 4 mg Intravenous Given Venancio Link RN      11/14/2018 2243 iohexol (OMNIPAQUE) 350 MG/ML injection (MULTI-DOSE) 100 mL 100 mL Intravenous Given Noreene Rush      11/15/2018 0144 sodium chloride 0 9 % bolus 1,000 mL 0 mL Intravenous Stopped Mariaelena Menezes RN      11/14/2018 2344 sodium chloride 0 9 % bolus 1,000 mL 1,000 mL Intravenous [address] Mariaelena Menezes RN      11/14/2018 2344 HYDROmorphone (DILAUDID) injection 1 mg 1 mg Intravenous Given Mariaelena Menezes RN      11/15/2018 0036 ondansetron (ZOFRAN) injection 4 mg 4 mg Intravenous Not Given Mariaelena Menezes RN      11/15/2018 0310 mirtazapine (REMERON) tablet 15 mg 15 mg Oral Given Mariaelena Menezes RN      11/15/2018 0230 sodium chloride 0 9 % infusion 150 mL/hr Intravenous [address] Mariaelena Menezes RN      11/15/2018 6578 insulin lispro (HumaLOG) 100 units/mL subcutaneous injection 2-12 Units 2 Units Subcutaneous Given Chelsea Gonzalez RN      11/15/2018 0234 oxyCODONE (ROXICODONE) immediate release tablet 10 mg 10 mg Oral Given Chelsea Gonzalez RN      11/15/2018 0234 acetaminophen (TYLENOL) tablet 650 mg 650 mg Oral Given Chelsea Gonzalez RN      11/15/2018 0348 ceFAZolin (ANCEF) IVPB (premix) 2,000 mg 0 mg Intravenous Stopped Chelsea Gonzalez RN      11/15/2018 2902 ceFAZolin (ANCEF) IVPB (premix) 2,000 mg 2,000 mg Intravenous [address] Chelsea Gonzalez RN      11/15/2018 0303 metroNIDAZOLE (FLAGYL) IVPB (premix) 500 mg 0 mg Intravenous Stopped Chelsea Gonzalez RN      11/15/2018 6747 metroNIDAZOLE (FLAGYL) IVPB (premix) 500 mg 500 mg Intravenous New Bag Chelsea Gonzalez RN           Past Medical/Surgical History:   Past Medical History:   Diagnosis Date    CAD (coronary artery disease)     Hypertension        Admitting Diagnosis: Pancreatitis [K85 90]  Chest pain [R07 9]    Age/Sex: 52 y o  male    Assessment/Plan:   Assessment:  53M who presents with abdominal and chest pain, with pancreatitis and acute cholecystitis      Plan:  - admit to surgery SD2  - aggressive IVF resuscitation, monitor UOP  - prn pain control  - telemetry for tachycardia  - ancef/flagyl  - check official RUQ u/s  - trend LFT's and lipase  - hold eliquis  - lap jhon during this admission    Admission Orders:  Scheduled Meds:   Current Facility-Administered Medications:  acetaminophen 650 mg Oral Q6H PRN   X 1 dose   albuterol 1 puff Inhalation PRN    amLODIPine 10 mg Oral Daily    aspirin 81 mg Oral Daily    atorvastatin 40 mg Oral HS    carvedilol 12 5 mg Oral BID With Meals    cefazolin 2,000 mg Intravenous Q8H Last Rate: Stopped (11/15/18 0348)   heparin (porcine) 5,000 Units Subcutaneous Q8H [facility]    HYDROmorphone 0 5 mg Intravenous Q3H PRN  x 1 dose   insulin lispro 2-12 Units Subcutaneous Q6H YOLIE    isosorbide mononitrate 60 mg Oral Daily    lisinopril 40 mg Oral BID    metroNIDAZOLE 500 mg Intravenous Q8H Last Rate: Stopped (11/15/18 0303)   mirtazapine 15 mg Oral HS    ondansetron 4 mg Intravenous Once    ondansetron 4 mg Intravenous Q6H PRN    oxyCODONE 10 mg Oral Q4H PRN    oxyCODONE 5 mg Oral Q4H PRN  x 1 dose   pantoprazole 40 mg Oral Early Morning    senna-docusate sodium 1 tablet Oral HS    sodium chloride 150 mL/hr Intravenous Continuous Last Rate: 150 mL/hr (11/15/18 0230)     Continuous Infusions:   sodium chloride 150 mL/hr Last Rate: 150 mL/hr (11/15/18 0230)     NPO, sips with medication  US gallbladder: pending  TELM  Heriberto SCDs

## 2018-11-15 NOTE — ED NOTES
Patient care handoff occurred at this time  Report given by Vera Dumont RN  Patient returns from CT at this time, currently resting comfortably awaiting results        Simón Lockett RN  11/14/18 6678

## 2018-11-15 NOTE — ED NOTES
Pt moved on to hospital bed for admission and hold in ED  Pt given pillows and blankets   Call bell in reach     Clarice Flores RN  11/15/18 4313

## 2018-11-16 LAB
ALBUMIN SERPL BCP-MCNC: 2.9 G/DL (ref 3.5–5)
ALP SERPL-CCNC: 139 U/L (ref 46–116)
ALT SERPL W P-5'-P-CCNC: 129 U/L (ref 12–78)
ANION GAP SERPL CALCULATED.3IONS-SCNC: 4 MMOL/L (ref 4–13)
AST SERPL W P-5'-P-CCNC: 58 U/L (ref 5–45)
BASOPHILS # BLD AUTO: 0.07 THOUSANDS/ΜL (ref 0–0.1)
BASOPHILS NFR BLD AUTO: 1 % (ref 0–1)
BILIRUB SERPL-MCNC: 0.81 MG/DL (ref 0.2–1)
BUN SERPL-MCNC: 13 MG/DL (ref 5–25)
CALCIUM SERPL-MCNC: 7.5 MG/DL (ref 8.3–10.1)
CHLORIDE SERPL-SCNC: 104 MMOL/L (ref 100–108)
CO2 SERPL-SCNC: 26 MMOL/L (ref 21–32)
CREAT SERPL-MCNC: 1.29 MG/DL (ref 0.6–1.3)
EOSINOPHIL # BLD AUTO: 0.5 THOUSAND/ΜL (ref 0–0.61)
EOSINOPHIL NFR BLD AUTO: 6 % (ref 0–6)
ERYTHROCYTE [DISTWIDTH] IN BLOOD BY AUTOMATED COUNT: 12.2 % (ref 11.6–15.1)
GFR SERPL CREATININE-BSD FRML MDRD: 62 ML/MIN/1.73SQ M
GLUCOSE SERPL-MCNC: 116 MG/DL (ref 65–140)
GLUCOSE SERPL-MCNC: 128 MG/DL (ref 65–140)
GLUCOSE SERPL-MCNC: 146 MG/DL (ref 65–140)
GLUCOSE SERPL-MCNC: 174 MG/DL (ref 65–140)
GLUCOSE SERPL-MCNC: 198 MG/DL (ref 65–140)
GLUCOSE SERPL-MCNC: 258 MG/DL (ref 65–140)
HCT VFR BLD AUTO: 41.6 % (ref 36.5–49.3)
HGB BLD-MCNC: 14.1 G/DL (ref 12–17)
IMM GRANULOCYTES # BLD AUTO: 0.01 THOUSAND/UL (ref 0–0.2)
IMM GRANULOCYTES NFR BLD AUTO: 0 % (ref 0–2)
INR PPP: 1.04 (ref 0.86–1.17)
LIPASE SERPL-CCNC: 216 U/L (ref 73–393)
LYMPHOCYTES # BLD AUTO: 2.86 THOUSANDS/ΜL (ref 0.6–4.47)
LYMPHOCYTES NFR BLD AUTO: 35 % (ref 14–44)
MAGNESIUM SERPL-MCNC: 1.9 MG/DL (ref 1.6–2.6)
MCH RBC QN AUTO: 30.6 PG (ref 26.8–34.3)
MCHC RBC AUTO-ENTMCNC: 33.9 G/DL (ref 31.4–37.4)
MCV RBC AUTO: 90 FL (ref 82–98)
MONOCYTES # BLD AUTO: 0.71 THOUSAND/ΜL (ref 0.17–1.22)
MONOCYTES NFR BLD AUTO: 9 % (ref 4–12)
NEUTROPHILS # BLD AUTO: 4.01 THOUSANDS/ΜL (ref 1.85–7.62)
NEUTS SEG NFR BLD AUTO: 49 % (ref 43–75)
NRBC BLD AUTO-RTO: 0 /100 WBCS
PHOSPHATE SERPL-MCNC: 2.2 MG/DL (ref 2.7–4.5)
PLATELET # BLD AUTO: 248 THOUSANDS/UL (ref 149–390)
PMV BLD AUTO: 10 FL (ref 8.9–12.7)
POTASSIUM SERPL-SCNC: 3.5 MMOL/L (ref 3.5–5.3)
PROT SERPL-MCNC: 6 G/DL (ref 6.4–8.2)
PROTHROMBIN TIME: 13.7 SECONDS (ref 11.8–14.2)
RBC # BLD AUTO: 4.61 MILLION/UL (ref 3.88–5.62)
SODIUM SERPL-SCNC: 134 MMOL/L (ref 136–145)
WBC # BLD AUTO: 8.16 THOUSAND/UL (ref 4.31–10.16)

## 2018-11-16 PROCEDURE — 83690 ASSAY OF LIPASE: CPT | Performed by: PHYSICIAN ASSISTANT

## 2018-11-16 PROCEDURE — 85025 COMPLETE CBC W/AUTO DIFF WBC: CPT | Performed by: PHYSICIAN ASSISTANT

## 2018-11-16 PROCEDURE — 99233 SBSQ HOSP IP/OBS HIGH 50: CPT | Performed by: SURGERY

## 2018-11-16 PROCEDURE — 84100 ASSAY OF PHOSPHORUS: CPT | Performed by: PHYSICIAN ASSISTANT

## 2018-11-16 PROCEDURE — 80053 COMPREHEN METABOLIC PANEL: CPT | Performed by: PHYSICIAN ASSISTANT

## 2018-11-16 PROCEDURE — 82948 REAGENT STRIP/BLOOD GLUCOSE: CPT

## 2018-11-16 PROCEDURE — 85610 PROTHROMBIN TIME: CPT | Performed by: PHYSICIAN ASSISTANT

## 2018-11-16 PROCEDURE — 83735 ASSAY OF MAGNESIUM: CPT | Performed by: PHYSICIAN ASSISTANT

## 2018-11-16 RX ORDER — MAGNESIUM SULFATE HEPTAHYDRATE 40 MG/ML
2 INJECTION, SOLUTION INTRAVENOUS ONCE
Status: COMPLETED | OUTPATIENT
Start: 2018-11-16 | End: 2018-11-16

## 2018-11-16 RX ORDER — POTASSIUM CHLORIDE 14.9 MG/ML
20 INJECTION INTRAVENOUS
Status: COMPLETED | OUTPATIENT
Start: 2018-11-16 | End: 2018-11-16

## 2018-11-16 RX ORDER — DEXTROSE, SODIUM CHLORIDE, AND POTASSIUM CHLORIDE 5; .45; .15 G/100ML; G/100ML; G/100ML
125 INJECTION INTRAVENOUS CONTINUOUS
Status: DISCONTINUED | OUTPATIENT
Start: 2018-11-16 | End: 2018-11-16

## 2018-11-16 RX ADMIN — LISINOPRIL 40 MG: 20 TABLET ORAL at 08:25

## 2018-11-16 RX ADMIN — ATORVASTATIN CALCIUM 40 MG: 40 TABLET, FILM COATED ORAL at 21:01

## 2018-11-16 RX ADMIN — OXYCODONE HYDROCHLORIDE 10 MG: 10 TABLET ORAL at 03:55

## 2018-11-16 RX ADMIN — CEFAZOLIN SODIUM 2000 MG: 2 SOLUTION INTRAVENOUS at 03:40

## 2018-11-16 RX ADMIN — HEPARIN SODIUM 5000 UNITS: 5000 INJECTION INTRAVENOUS; SUBCUTANEOUS at 05:58

## 2018-11-16 RX ADMIN — POTASSIUM CHLORIDE 20 MEQ: 200 INJECTION, SOLUTION INTRAVENOUS at 13:54

## 2018-11-16 RX ADMIN — OXYCODONE HYDROCHLORIDE 10 MG: 10 TABLET ORAL at 08:25

## 2018-11-16 RX ADMIN — HEPARIN SODIUM 5000 UNITS: 5000 INJECTION INTRAVENOUS; SUBCUTANEOUS at 15:35

## 2018-11-16 RX ADMIN — LISINOPRIL 40 MG: 20 TABLET ORAL at 17:28

## 2018-11-16 RX ADMIN — CARVEDILOL 12.5 MG: 12.5 TABLET, FILM COATED ORAL at 17:28

## 2018-11-16 RX ADMIN — METRONIDAZOLE 500 MG: 500 INJECTION, SOLUTION INTRAVENOUS at 10:20

## 2018-11-16 RX ADMIN — ASPIRIN 81 MG: 81 TABLET, COATED ORAL at 08:25

## 2018-11-16 RX ADMIN — POTASSIUM CHLORIDE 20 MEQ: 200 INJECTION, SOLUTION INTRAVENOUS at 15:35

## 2018-11-16 RX ADMIN — HEPARIN SODIUM 5000 UNITS: 5000 INJECTION INTRAVENOUS; SUBCUTANEOUS at 21:00

## 2018-11-16 RX ADMIN — POTASSIUM PHOSPHATE, MONOBASIC AND POTASSIUM PHOSPHATE, DIBASIC 30 MMOL: 224; 236 INJECTION, SOLUTION INTRAVENOUS at 08:05

## 2018-11-16 RX ADMIN — MAGNESIUM SULFATE IN WATER 2 G: 40 INJECTION, SOLUTION INTRAVENOUS at 08:09

## 2018-11-16 RX ADMIN — CEFAZOLIN SODIUM 2000 MG: 2 SOLUTION INTRAVENOUS at 11:31

## 2018-11-16 RX ADMIN — HYDROMORPHONE HYDROCHLORIDE 0.5 MG: 1 INJECTION, SOLUTION INTRAMUSCULAR; INTRAVENOUS; SUBCUTANEOUS at 01:34

## 2018-11-16 RX ADMIN — CEFAZOLIN SODIUM 2000 MG: 2 SOLUTION INTRAVENOUS at 20:00

## 2018-11-16 RX ADMIN — DEXTROSE, SODIUM CHLORIDE, AND POTASSIUM CHLORIDE 125 ML/HR: 5; .45; .15 INJECTION INTRAVENOUS at 08:27

## 2018-11-16 RX ADMIN — OXYCODONE HYDROCHLORIDE 10 MG: 10 TABLET ORAL at 13:02

## 2018-11-16 RX ADMIN — MIRTAZAPINE 15 MG: 15 TABLET, FILM COATED ORAL at 21:02

## 2018-11-16 RX ADMIN — AMLODIPINE BESYLATE 10 MG: 10 TABLET ORAL at 08:24

## 2018-11-16 RX ADMIN — METRONIDAZOLE 500 MG: 500 INJECTION, SOLUTION INTRAVENOUS at 01:35

## 2018-11-16 RX ADMIN — CARVEDILOL 12.5 MG: 12.5 TABLET, FILM COATED ORAL at 07:03

## 2018-11-16 RX ADMIN — SODIUM CHLORIDE 150 ML/HR: 0.9 INJECTION, SOLUTION INTRAVENOUS at 00:00

## 2018-11-16 RX ADMIN — INSULIN LISPRO 6 UNITS: 100 INJECTION, SOLUTION INTRAVENOUS; SUBCUTANEOUS at 18:10

## 2018-11-16 RX ADMIN — OXYCODONE HYDROCHLORIDE 10 MG: 10 TABLET ORAL at 21:23

## 2018-11-16 RX ADMIN — OXYCODONE HYDROCHLORIDE 10 MG: 10 TABLET ORAL at 17:28

## 2018-11-16 RX ADMIN — HYDROMORPHONE HYDROCHLORIDE 0.5 MG: 1 INJECTION, SOLUTION INTRAMUSCULAR; INTRAVENOUS; SUBCUTANEOUS at 07:03

## 2018-11-16 RX ADMIN — ISOSORBIDE MONONITRATE 60 MG: 60 TABLET, EXTENDED RELEASE ORAL at 08:25

## 2018-11-16 RX ADMIN — INSULIN LISPRO 2 UNITS: 100 INJECTION, SOLUTION INTRAVENOUS; SUBCUTANEOUS at 11:28

## 2018-11-16 RX ADMIN — SENNOSIDES AND DOCUSATE SODIUM 1 TABLET: 8.6; 5 TABLET ORAL at 21:02

## 2018-11-16 RX ADMIN — METRONIDAZOLE 500 MG: 500 INJECTION, SOLUTION INTRAVENOUS at 17:28

## 2018-11-16 RX ADMIN — PANTOPRAZOLE SODIUM 40 MG: 40 TABLET, DELAYED RELEASE ORAL at 05:58

## 2018-11-16 NOTE — PLAN OF CARE
DISCHARGE PLANNING - CARE MANAGEMENT     Discharge to post-acute care or home with appropriate resources Progressing        INFECTION - ADULT     Absence or prevention of progression during hospitalization Progressing     Absence of fever/infection during neutropenic period Progressing        Knowledge Deficit     Patient/family/caregiver demonstrates understanding of disease process, treatment plan, medications, and discharge instructions Progressing        PAIN - ADULT     Verbalizes/displays adequate comfort level or baseline comfort level Progressing        SAFETY ADULT     Patient will remain free of falls Progressing     Maintain or return to baseline ADL function Progressing     Maintain or return mobility status to optimal level Progressing

## 2018-11-16 NOTE — PROGRESS NOTES
Progress Note - General Surgery  Wilda Haney 52 y o  male MRN: [id number]  Unit/Bed#: [facility] 404-01 Encounter: [id number]    Assessment:  53M w/acute cholecystitis and biliary pancreatitis  Plan:  - clears today, will make NPO p mn for possible OR tomorrow for lap jhon  - continue IVF @ 150 since patient complains of dry mouth/concentrated urine  - prn pain control  - continue telemetry  - continue to hold eliquis, will resume following operative intervention  - appreciate cards recs  - trend LFT's, lipase  - SQH/SCDs      Subjective/Objective   Subjective: feels hungry/thirsty, has been urinating but it is still dark    Objective:    Blood pressure 155/75, pulse 86, temperature 99 °F (37 2 °C), temperature source Oral, resp  rate 18, height 6' 2" (1 88 m), weight 123 kg (272 lb 0 8 oz), SpO2 99 %  ,Body mass index is 34 93 kg/m²  I/O last 24 hours: In: 6172 5 [P O :200;  I V :3522 5; IV Piggyback:2450]  Out: 2025 [Urine:2025]    Invasive Devices     Peripheral Intravenous Line            Peripheral IV 11/14/18 Left Antecubital 1 day                Physical Exam:   NAD, alert and oriented x3  Normocephalic, atraumatic  MMM, EOMI, PERRLA  Norm resp effort on RA  Irregular rhythm on telemetry  Abd soft, tender in RLQ, ND  No calf tenderness or peripheral edema  Motor/sensation intact in distal extremities  CN grossly intact  -rash/lesions      Lab, Imaging and other studies:  Lab Results   Component Value Date    WBC 7 07 11/15/2018    HGB 15 0 11/15/2018    HCT 42 9 11/15/2018    MCV 88 11/15/2018     11/15/2018      Lab Results   Component Value Date    GLUCOSE 131 05/24/2015    CALCIUM 7 9 (L) 11/15/2018     05/24/2015    K 3 7 11/15/2018    CO2 30 11/15/2018     11/15/2018    BUN 16 11/15/2018    CREATININE 1 42 (H) 11/15/2018       VTE Pharmacologic Prophylaxis: Heparin  VTE Mechanical Prophylaxis: sequential compression device

## 2018-11-17 LAB
ALBUMIN SERPL BCP-MCNC: 2.8 G/DL (ref 3.5–5)
ALP SERPL-CCNC: 130 U/L (ref 46–116)
ALT SERPL W P-5'-P-CCNC: 67 U/L (ref 12–78)
ANION GAP SERPL CALCULATED.3IONS-SCNC: 6 MMOL/L (ref 4–13)
AST SERPL W P-5'-P-CCNC: 23 U/L (ref 5–45)
BASOPHILS # BLD AUTO: 0.05 THOUSANDS/ΜL (ref 0–0.1)
BASOPHILS NFR BLD AUTO: 1 % (ref 0–1)
BILIRUB SERPL-MCNC: 0.67 MG/DL (ref 0.2–1)
BUN SERPL-MCNC: 9 MG/DL (ref 5–25)
CALCIUM SERPL-MCNC: 7.7 MG/DL (ref 8.3–10.1)
CHLORIDE SERPL-SCNC: 101 MMOL/L (ref 100–108)
CO2 SERPL-SCNC: 26 MMOL/L (ref 21–32)
CREAT SERPL-MCNC: 1.4 MG/DL (ref 0.6–1.3)
EOSINOPHIL # BLD AUTO: 0.37 THOUSAND/ΜL (ref 0–0.61)
EOSINOPHIL NFR BLD AUTO: 6 % (ref 0–6)
ERYTHROCYTE [DISTWIDTH] IN BLOOD BY AUTOMATED COUNT: 11.9 % (ref 11.6–15.1)
GFR SERPL CREATININE-BSD FRML MDRD: 56 ML/MIN/1.73SQ M
GLUCOSE SERPL-MCNC: 142 MG/DL (ref 65–140)
GLUCOSE SERPL-MCNC: 166 MG/DL (ref 65–140)
GLUCOSE SERPL-MCNC: 175 MG/DL (ref 65–140)
GLUCOSE SERPL-MCNC: 212 MG/DL (ref 65–140)
GLUCOSE SERPL-MCNC: 239 MG/DL (ref 65–140)
HCT VFR BLD AUTO: 39.4 % (ref 36.5–49.3)
HGB BLD-MCNC: 13.3 G/DL (ref 12–17)
IMM GRANULOCYTES # BLD AUTO: 0.02 THOUSAND/UL (ref 0–0.2)
IMM GRANULOCYTES NFR BLD AUTO: 0 % (ref 0–2)
LYMPHOCYTES # BLD AUTO: 2.31 THOUSANDS/ΜL (ref 0.6–4.47)
LYMPHOCYTES NFR BLD AUTO: 35 % (ref 14–44)
MAGNESIUM SERPL-MCNC: 2 MG/DL (ref 1.6–2.6)
MCH RBC QN AUTO: 30.4 PG (ref 26.8–34.3)
MCHC RBC AUTO-ENTMCNC: 33.8 G/DL (ref 31.4–37.4)
MCV RBC AUTO: 90 FL (ref 82–98)
MONOCYTES # BLD AUTO: 0.7 THOUSAND/ΜL (ref 0.17–1.22)
MONOCYTES NFR BLD AUTO: 11 % (ref 4–12)
NEUTROPHILS # BLD AUTO: 3.16 THOUSANDS/ΜL (ref 1.85–7.62)
NEUTS SEG NFR BLD AUTO: 47 % (ref 43–75)
NRBC BLD AUTO-RTO: 0 /100 WBCS
PHOSPHATE SERPL-MCNC: 2.1 MG/DL (ref 2.7–4.5)
PLATELET # BLD AUTO: 216 THOUSANDS/UL (ref 149–390)
PMV BLD AUTO: 9.9 FL (ref 8.9–12.7)
POTASSIUM SERPL-SCNC: 3.4 MMOL/L (ref 3.5–5.3)
PROT SERPL-MCNC: 6.1 G/DL (ref 6.4–8.2)
RBC # BLD AUTO: 4.37 MILLION/UL (ref 3.88–5.62)
SODIUM SERPL-SCNC: 133 MMOL/L (ref 136–145)
WBC # BLD AUTO: 6.61 THOUSAND/UL (ref 4.31–10.16)

## 2018-11-17 PROCEDURE — 83735 ASSAY OF MAGNESIUM: CPT | Performed by: STUDENT IN AN ORGANIZED HEALTH CARE EDUCATION/TRAINING PROGRAM

## 2018-11-17 PROCEDURE — 82948 REAGENT STRIP/BLOOD GLUCOSE: CPT

## 2018-11-17 PROCEDURE — 84100 ASSAY OF PHOSPHORUS: CPT | Performed by: STUDENT IN AN ORGANIZED HEALTH CARE EDUCATION/TRAINING PROGRAM

## 2018-11-17 PROCEDURE — 85025 COMPLETE CBC W/AUTO DIFF WBC: CPT | Performed by: STUDENT IN AN ORGANIZED HEALTH CARE EDUCATION/TRAINING PROGRAM

## 2018-11-17 PROCEDURE — 99232 SBSQ HOSP IP/OBS MODERATE 35: CPT | Performed by: SURGERY

## 2018-11-17 PROCEDURE — 80053 COMPREHEN METABOLIC PANEL: CPT | Performed by: STUDENT IN AN ORGANIZED HEALTH CARE EDUCATION/TRAINING PROGRAM

## 2018-11-17 RX ORDER — POTASSIUM CHLORIDE 14.9 MG/ML
20 INJECTION INTRAVENOUS
Status: COMPLETED | OUTPATIENT
Start: 2018-11-17 | End: 2018-11-18

## 2018-11-17 RX ADMIN — ASPIRIN 81 MG: 81 TABLET, COATED ORAL at 09:13

## 2018-11-17 RX ADMIN — OXYCODONE HYDROCHLORIDE 10 MG: 10 TABLET ORAL at 03:28

## 2018-11-17 RX ADMIN — POTASSIUM CHLORIDE 20 MEQ: 200 INJECTION, SOLUTION INTRAVENOUS at 17:55

## 2018-11-17 RX ADMIN — ACETAMINOPHEN 650 MG: 325 TABLET, FILM COATED ORAL at 03:29

## 2018-11-17 RX ADMIN — MIRTAZAPINE 15 MG: 15 TABLET, FILM COATED ORAL at 22:14

## 2018-11-17 RX ADMIN — LISINOPRIL 40 MG: 20 TABLET ORAL at 09:12

## 2018-11-17 RX ADMIN — OXYCODONE HYDROCHLORIDE 5 MG: 5 TABLET ORAL at 16:13

## 2018-11-17 RX ADMIN — CARVEDILOL 12.5 MG: 12.5 TABLET, FILM COATED ORAL at 09:13

## 2018-11-17 RX ADMIN — PANTOPRAZOLE SODIUM 40 MG: 40 TABLET, DELAYED RELEASE ORAL at 05:15

## 2018-11-17 RX ADMIN — METRONIDAZOLE 500 MG: 500 INJECTION, SOLUTION INTRAVENOUS at 09:15

## 2018-11-17 RX ADMIN — CEFAZOLIN SODIUM 2000 MG: 2 SOLUTION INTRAVENOUS at 02:40

## 2018-11-17 RX ADMIN — LISINOPRIL 40 MG: 20 TABLET ORAL at 17:55

## 2018-11-17 RX ADMIN — METRONIDAZOLE 500 MG: 500 INJECTION, SOLUTION INTRAVENOUS at 17:54

## 2018-11-17 RX ADMIN — HEPARIN SODIUM 5000 UNITS: 5000 INJECTION INTRAVENOUS; SUBCUTANEOUS at 22:14

## 2018-11-17 RX ADMIN — OXYCODONE HYDROCHLORIDE 5 MG: 5 TABLET ORAL at 11:35

## 2018-11-17 RX ADMIN — HEPARIN SODIUM 5000 UNITS: 5000 INJECTION INTRAVENOUS; SUBCUTANEOUS at 13:28

## 2018-11-17 RX ADMIN — INSULIN LISPRO 4 UNITS: 100 INJECTION, SOLUTION INTRAVENOUS; SUBCUTANEOUS at 12:41

## 2018-11-17 RX ADMIN — OXYCODONE HYDROCHLORIDE 10 MG: 10 TABLET ORAL at 07:35

## 2018-11-17 RX ADMIN — ATORVASTATIN CALCIUM 40 MG: 40 TABLET, FILM COATED ORAL at 22:14

## 2018-11-17 RX ADMIN — INSULIN LISPRO 4 UNITS: 100 INJECTION, SOLUTION INTRAVENOUS; SUBCUTANEOUS at 17:55

## 2018-11-17 RX ADMIN — ISOSORBIDE MONONITRATE 60 MG: 60 TABLET, EXTENDED RELEASE ORAL at 09:13

## 2018-11-17 RX ADMIN — OXYCODONE HYDROCHLORIDE 5 MG: 5 TABLET ORAL at 20:39

## 2018-11-17 RX ADMIN — INSULIN LISPRO 2 UNITS: 100 INJECTION, SOLUTION INTRAVENOUS; SUBCUTANEOUS at 00:00

## 2018-11-17 RX ADMIN — POTASSIUM CHLORIDE 20 MEQ: 200 INJECTION, SOLUTION INTRAVENOUS at 20:40

## 2018-11-17 RX ADMIN — CEFAZOLIN SODIUM 2000 MG: 2 SOLUTION INTRAVENOUS at 10:15

## 2018-11-17 RX ADMIN — HEPARIN SODIUM 5000 UNITS: 5000 INJECTION INTRAVENOUS; SUBCUTANEOUS at 05:15

## 2018-11-17 RX ADMIN — AMLODIPINE BESYLATE 10 MG: 10 TABLET ORAL at 09:13

## 2018-11-17 RX ADMIN — SENNOSIDES AND DOCUSATE SODIUM 1 TABLET: 8.6; 5 TABLET ORAL at 22:15

## 2018-11-17 RX ADMIN — METRONIDAZOLE 500 MG: 500 INJECTION, SOLUTION INTRAVENOUS at 02:39

## 2018-11-17 RX ADMIN — CEFAZOLIN SODIUM 2000 MG: 2 SOLUTION INTRAVENOUS at 18:46

## 2018-11-17 RX ADMIN — CARVEDILOL 12.5 MG: 12.5 TABLET, FILM COATED ORAL at 16:13

## 2018-11-17 RX ADMIN — INSULIN LISPRO 2 UNITS: 100 INJECTION, SOLUTION INTRAVENOUS; SUBCUTANEOUS at 06:23

## 2018-11-17 NOTE — PROGRESS NOTES
Progress Note - General Surgery   Claudetta Rouleau 52 y o  male MRN: [id number]  Unit/Bed#: [facility] 404-01 Encounter: [id number]    Assessment:  52 y o  M w/ biliary pancreatitis and cholecystitis    Pancreatitis is resolving, however persistent R abdominal pain and now febrile    Plan:  F/U AM labs  Cont abx  Consider blood cultures later today if persistently febrile  Diet as tolerated  May need cholecystectomy this weekend, was discussed yesterday w/ attending poss Sunday    Subjective/Objective   Chief Complaint:     Subjective: [place] clears, passing flatus, nondistended    Objective:     Blood pressure 142/70, pulse 81, temperature (!) 102 °F (38 9 °C), temperature source Oral, resp  rate 18, height 6' 2" (1 88 m), weight 125 kg (276 lb), SpO2 94 %  ,Body mass index is 35 44 kg/m²  Intake/Output Summary (Last 24 hours) at 11/17/18 0411  Last data filed at 11/17/18 0349   Gross per 24 hour   Intake          3573 75 ml   Output             1525 ml   Net          2048  75 ml       Invasive Devices     Peripheral Intravenous Line            Peripheral IV 11/14/18 Left Antecubital 2 days                Physical Exam:   NAD  CV RRR  Pulm CTA  Abd soft, NTND, minimal pain RLQ    Lab, Imaging and other studies:  I have personally reviewed pertinent lab results    , CBC:   Lab Results   Component Value Date    WBC 8 16 11/16/2018    HGB 14 1 11/16/2018    HCT 41 6 11/16/2018    MCV 90 11/16/2018     11/16/2018    MCH 30 6 11/16/2018    MCHC 33 9 11/16/2018    RDW 12 2 11/16/2018    MPV 10 0 11/16/2018    NRBC 0 11/16/2018   , CMP:   Lab Results   Component Value Date    SODIUM 134 (L) 11/16/2018    K 3 5 11/16/2018     11/16/2018    CO2 26 11/16/2018    BUN 13 11/16/2018    CREATININE 1 29 11/16/2018    CALCIUM 7 5 (L) 11/16/2018    AST 58 (H) 11/16/2018     (H) 11/16/2018    ALKPHOS 139 (H) 11/16/2018    EGFR 62 11/16/2018     VTE Pharmacologic Prophylaxis: Heparin  VTE Mechanical Prophylaxis: sequential compression device

## 2018-11-18 VITALS
RESPIRATION RATE: 15 BRPM | DIASTOLIC BLOOD PRESSURE: 94 MMHG | BODY MASS INDEX: 35.42 KG/M2 | TEMPERATURE: 98.1 F | WEIGHT: 276 LBS | HEART RATE: 76 BPM | OXYGEN SATURATION: 97 % | SYSTOLIC BLOOD PRESSURE: 166 MMHG | HEIGHT: 74 IN

## 2018-11-18 LAB
ANION GAP SERPL CALCULATED.3IONS-SCNC: 4 MMOL/L (ref 4–13)
BUN SERPL-MCNC: 8 MG/DL (ref 5–25)
CALCIUM SERPL-MCNC: 8 MG/DL (ref 8.3–10.1)
CHLORIDE SERPL-SCNC: 103 MMOL/L (ref 100–108)
CO2 SERPL-SCNC: 28 MMOL/L (ref 21–32)
CREAT SERPL-MCNC: 1.16 MG/DL (ref 0.6–1.3)
GFR SERPL CREATININE-BSD FRML MDRD: 70 ML/MIN/1.73SQ M
GLUCOSE SERPL-MCNC: 163 MG/DL (ref 65–140)
GLUCOSE SERPL-MCNC: 168 MG/DL (ref 65–140)
GLUCOSE SERPL-MCNC: 204 MG/DL (ref 65–140)
MAGNESIUM SERPL-MCNC: 1.8 MG/DL (ref 1.6–2.6)
POTASSIUM SERPL-SCNC: 3.7 MMOL/L (ref 3.5–5.3)
SODIUM SERPL-SCNC: 135 MMOL/L (ref 136–145)

## 2018-11-18 PROCEDURE — 83735 ASSAY OF MAGNESIUM: CPT | Performed by: SURGERY

## 2018-11-18 PROCEDURE — 82948 REAGENT STRIP/BLOOD GLUCOSE: CPT

## 2018-11-18 PROCEDURE — 99232 SBSQ HOSP IP/OBS MODERATE 35: CPT | Performed by: SURGERY

## 2018-11-18 PROCEDURE — 80048 BASIC METABOLIC PNL TOTAL CA: CPT | Performed by: SURGERY

## 2018-11-18 RX ORDER — METRONIDAZOLE 500 MG/1
500 TABLET ORAL EVERY 8 HOURS SCHEDULED
Status: DISCONTINUED | OUTPATIENT
Start: 2018-11-18 | End: 2018-11-18 | Stop reason: HOSPADM

## 2018-11-18 RX ORDER — OXYCODONE HYDROCHLORIDE AND ACETAMINOPHEN 5; 325 MG/1; MG/1
1 TABLET ORAL EVERY 4 HOURS PRN
Qty: 30 TABLET | Refills: 0 | Status: SHIPPED | OUTPATIENT
Start: 2018-11-18 | End: 2018-11-28

## 2018-11-18 RX ORDER — METRONIDAZOLE 500 MG/1
500 TABLET ORAL EVERY 8 HOURS SCHEDULED
Qty: 33 TABLET | Refills: 0 | Status: SHIPPED | OUTPATIENT
Start: 2018-11-18 | End: 2018-11-29

## 2018-11-18 RX ORDER — ACETAMINOPHEN 325 MG/1
650 TABLET ORAL EVERY 4 HOURS PRN
Qty: 30 TABLET | Refills: 0 | Status: SHIPPED | OUTPATIENT
Start: 2018-11-18 | End: 2018-11-18 | Stop reason: HOSPADM

## 2018-11-18 RX ORDER — CIPROFLOXACIN 500 MG/1
500 TABLET, FILM COATED ORAL EVERY 12 HOURS SCHEDULED
Qty: 22 TABLET | Refills: 0 | Status: SHIPPED | OUTPATIENT
Start: 2018-11-18 | End: 2018-11-29

## 2018-11-18 RX ADMIN — CEFAZOLIN SODIUM 2000 MG: 2 SOLUTION INTRAVENOUS at 03:40

## 2018-11-18 RX ADMIN — INSULIN LISPRO 4 UNITS: 100 INJECTION, SOLUTION INTRAVENOUS; SUBCUTANEOUS at 11:36

## 2018-11-18 RX ADMIN — INSULIN LISPRO 2 UNITS: 100 INJECTION, SOLUTION INTRAVENOUS; SUBCUTANEOUS at 06:59

## 2018-11-18 RX ADMIN — ASPIRIN 81 MG: 81 TABLET, COATED ORAL at 09:48

## 2018-11-18 RX ADMIN — METRONIDAZOLE 500 MG: 500 TABLET ORAL at 11:35

## 2018-11-18 RX ADMIN — HEPARIN SODIUM 5000 UNITS: 5000 INJECTION INTRAVENOUS; SUBCUTANEOUS at 05:07

## 2018-11-18 RX ADMIN — AMLODIPINE BESYLATE 10 MG: 10 TABLET ORAL at 09:47

## 2018-11-18 RX ADMIN — CEFAZOLIN SODIUM 2000 MG: 2 SOLUTION INTRAVENOUS at 11:35

## 2018-11-18 RX ADMIN — OXYCODONE HYDROCHLORIDE 5 MG: 5 TABLET ORAL at 09:47

## 2018-11-18 RX ADMIN — ISOSORBIDE MONONITRATE 60 MG: 60 TABLET, EXTENDED RELEASE ORAL at 09:49

## 2018-11-18 RX ADMIN — OXYCODONE HYDROCHLORIDE 5 MG: 5 TABLET ORAL at 05:07

## 2018-11-18 RX ADMIN — PANTOPRAZOLE SODIUM 40 MG: 40 TABLET, DELAYED RELEASE ORAL at 05:07

## 2018-11-18 RX ADMIN — METRONIDAZOLE 500 MG: 500 INJECTION, SOLUTION INTRAVENOUS at 03:15

## 2018-11-18 RX ADMIN — CARVEDILOL 12.5 MG: 12.5 TABLET, FILM COATED ORAL at 09:48

## 2018-11-18 RX ADMIN — LISINOPRIL 40 MG: 20 TABLET ORAL at 09:47

## 2018-11-18 NOTE — PROGRESS NOTES
The IV metronidazole has / have been converted to Oral per [facility] IV-to-PO Auto-Conversion Protocol for Adults as approved by the Pharmacy and Therapeutics Committee (accessible here on MyNET)  The patient met ALL eligible criteria:  1) Age >= 25years old   2) Received at least one dose of the IV form   3) Receiving at least one other scheduled oral/enteral medication   4) Tolerating an oral/enteral diet     And did NOT have any exclusions:   1) Critical care patient   2) Active GI bleed (IF assessing H2RAs or PPIs)   3) Continuous tube feeding (IF assessing cipro, doxycycline, levofloxacin, minocycline, rifampin, or voriconazole)   4) Receiving PO vancomycin (IF assessing metronidazole)   5) Persistent nausea and/or vomiting   6) Ileus or gastrointestinal obstruction   7) Yanelis/nasogastric tube set for continuous suction   8) Specific order not to automatically convert to PO    Please call the Pharmacy with any questions or concerns  Fabio Vieira, Pharm  D , [facility]  Clinical Pharmacist, [address]  [phone] or TigerText

## 2018-11-18 NOTE — PROGRESS NOTES
Progress Note - General Surgery   Louretta Counter 52 y o  male MRN: [id number]  Unit/Bed#: [facility] 404-01 Encounter: [id number]    Assessment:  52 M with biliary pancreatitis and cholecystitis    Last lipase 11/16 - 216  LFT's normalizing as of 11/17    Plan:  Continue Regular diet  Ancef/Flagyl - Continue Abx  Discussed with patient yesterday 11/17 that outpatient surgery in 2-3 weeks will be best option once abdomen calms down  Dispo planning - possible d/c 11/18    Subjective/Objective   Chief Complaint:     Subjective: No acute events  Patient with flatus and bowel movements  Fever of 102 at [facility] 11/17 however afebrile since that time  Objective:     Blood pressure 142/97, pulse 90, temperature 98 3 °F (36 8 °C), temperature source Oral, resp  rate 18, height 6' 2" (1 88 m), weight 125 kg (276 lb), SpO2 96 %  ,Body mass index is 35 44 kg/m²  Intake/Output Summary (Last 24 hours) at 11/18/18 0713  Last data filed at 11/18/18 0531   Gross per 24 hour   Intake             1470 ml   Output             3850 ml   Net            -2380 ml       Invasive Devices     Peripheral Intravenous Line            Peripheral IV 11/14/18 Left Antecubital 3 days                Physical Exam: General: AAOx3  Respiratory: BS b/l  Abdomen: Soft, Rebound tenderness at epigastric region, minimal tenderness in RLQ  Heart: RRR, S1s2  Ext: Warm no cyanosis     Lab, Imaging and other studies:  I have personally reviewed pertinent lab results    , CBC: No results found for: WBC, HGB, HCT, MCV, PLT, ADJUSTEDWBC, MCH, MCHC, RDW, MPV, NRBC, CMP:   Lab Results   Component Value Date    SODIUM 135 (L) 11/18/2018    K 3 7 11/18/2018     11/18/2018    CO2 28 11/18/2018    BUN 8 11/18/2018    CREATININE 1 16 11/18/2018    CALCIUM 8 0 (L) 11/18/2018    EGFR 70 11/18/2018     VTE Pharmacologic Prophylaxis: Heparin  VTE Mechanical Prophylaxis: sequential compression device

## 2018-11-18 NOTE — DISCHARGE INSTRUCTIONS
Complete your course of antibiotics as prescribed    Follow up in 2 weeks to discuss surgery    Return to the hospital sooner for increasing pain, inability to take anything by mouth, or refractory nausea or vomiting, or fevers >101    Do not drive while taking narcotic pain medication    Eat a healthy low-fat diet

## 2018-11-18 NOTE — DISCHARGE SUMMARY
Discharge Summary - Kamari Gr 52 y o  male MRN: [id number]    Unit/Bed#: [facility] 404-01 Encounter: [id number]    Admission Date:   Admission Orders     Ordered        11/15/18 0200  Inpatient Admission  Once             Discharge Date : 11/18/18    Admitting Diagnosis: Pancreatitis [K85 90]  Chest pain [R07 9]    HPI: 52 y o  male who presents with right-sided chest pain and epigastric/right sided abdominal pain  Patient states pain started yesterday in the afternoon and felt like a sharp pain in his right chest and abdomen that eventually came to rest in RLQ this evening  He had similar pain to this a week and a half ago and went to [facility]  It was thought to be cardiac in etiology so he was sent to the cath lab  Cath revealed 2 vessel disease with 70-80% stenoses in each, but no stent was placed during that admission secondary to normal stress test  He was discharged without further workup of pain  Associated symptoms include nausea/vomiting, fever/chills, lightheadedness, concentrated urine  Procedures Performed: No orders of the defined types were placed in this encounter  Summary of Hospital Course: A CT was done which showed stones in the gallbladder neck as well as pericholecysteic inflammatory changes  His lipase was over 30,000  The patient was admitted to the surgical service for biliary pancreatitis with associated cholecystitis  He was placed on IV antibiotics (Ancef/Flagyl) and made NPO with fluid resuscitation  His lipase quickly downtrended and abdominal pain improved  Cardiology was consulted for evaluation for further operative intervention (Lap cholecystectomy), due to his history of CAD and hypertensive urgency requiring admission  He was deemed acceptable risk   However, with further discussion between the surgical team and the patient, the best treatment course was decided to be interval cholecystectomy, due to his extensive gallbladder wall thickening and higher chance of conversion to open procedure  The patient was started on a diet and tolerated it well  He was discharged on 11/18 after his lipase and LFTs had normalized and he was able to tolerate PO  He was instructed to follow up in office in 2 weeks to discuss cholecystectomy  He will be treated with antibiotics for a total of 14 days, and was sent with a script for Cipro & Flagyl (as well as PO pain meds)  Significant Findings, Care, Treatment and Services Provided: as above    Complications: none    Discharge Diagnosis: Acute cholecystitis, biliary pancreatitis    Resolved Problems  Date Reviewed: 11/14/2018    None          Condition at Discharge: fair         Discharge instructions/Information to patient and family:   See after visit summary for information provided to patient and family  Provisions for Follow-Up Care:  See after visit summary for information related to follow-up care and any pertinent home health orders  PCP: Erin Amanda DO    Disposition: Home    Planned Readmission: Yes, if interval laparoscopic cholecystectomy occurs within the next month as planned    Discharge Statement   I spent 30 minutes discharging the patient  This time was spent on the day of discharge  I had direct contact with the patient on the day of discharge  Additional documentation is required if more than 30 minutes were spent on discharge  Discharge Medications:  See after visit summary for reconciled discharge medications provided to patient and family

## 2018-11-26 ENCOUNTER — OFFICE VISIT (OUTPATIENT)
Dept: SURGERY | Facility: CLINIC | Age: 53
End: 2018-11-26
Payer: COMMERCIAL

## 2018-11-26 VITALS
DIASTOLIC BLOOD PRESSURE: 76 MMHG | HEIGHT: 74 IN | SYSTOLIC BLOOD PRESSURE: 136 MMHG | BODY MASS INDEX: 34.65 KG/M2 | TEMPERATURE: 97.8 F | WEIGHT: 270 LBS

## 2018-11-26 DIAGNOSIS — K81.9 CHOLECYSTITIS: Primary | ICD-10-CM

## 2018-11-26 PROCEDURE — 99203 OFFICE O/P NEW LOW 30 MIN: CPT | Performed by: SURGERY

## 2018-11-26 RX ORDER — SODIUM CHLORIDE 9 MG/ML
100 INJECTION, SOLUTION INTRAVENOUS CONTINUOUS
Status: CANCELLED | OUTPATIENT
Start: 2018-11-27

## 2018-11-26 RX ORDER — CEFAZOLIN SODIUM 1 G/50ML
1000 SOLUTION INTRAVENOUS
Status: CANCELLED | OUTPATIENT
Start: 2018-11-27 | End: 2018-11-28

## 2018-11-26 NOTE — ASSESSMENT & PLAN NOTE
51 y/o M, recently admitted w/ gallstone pancreatitis, now requiring interval laparoscopic cholecystectomy  --Will schedule laparoscopic cholecystectomy, possible open, possible IOC, after pt finishes his course of Abx  --Pt directed to hold Eliquis 48 hours PreOp  --Order IV Abx, PreOp labs  --If bilirubin and/or LFTs elevated, will proceed with additional intra-operative cholangiogram  --Cleared by Cardiology, pt denies any current Cocaine use

## 2018-11-26 NOTE — H&P
Office Visit - General Surgery  Mikki Paula MRN: [id number]  Encounter: [id number]     Assessment and Plan         Problem List Items Addressed This Visit               Digestive     Cholecystitis - Primary       51 y/o M, recently admitted w/ gallstone pancreatitis, now requiring interval laparoscopic cholecystectomy  --Will schedule laparoscopic cholecystectomy, possible open, possible IOC, after pt finishes his course of Abx  --Pt directed to hold Eliquis 48 hours PreOp  --Order IV Abx, PreOp labs  --If bilirubin and/or LFTs elevated, will proceed with additional intra-operative cholangiogram  --Cleared by Cardiology, pt denies any current Cocaine use                    Chief Complaint:  Mikki Paula is a 52 y o  male who presents for Cholelithiasis (Consult gallbladder)     Subjective     Pt is a 51 y/o M w/ recent admission for gallstone pancreatitis, who now presents for follow-up for scheduling an interval cholecystectomy  Of note, has 2-vessel CAD with 70-80% stenosis in each, s/p cardiac cath  Exercise stress test in 11/2018 was normal  Cardiology cleared pt for laparoscopic cholecystectomy on 11/15, but recommended continuing his beta blocker and statin post-operatively and holding Eliquis pre-op, but continuing it post-operatively       Currently, pt c/o intermittent epigastric pain and RLQ pain  Tolerating diet  Denies any N/V  Does have hx of GERD  Pt denies any current alcohol or drug use   Reports he last used Cocaine in 02/2018      Past Medical History  Medical History        Past Medical History:   Diagnosis Date    CAD (coronary artery disease)      Hypertension              Past Surgical History  Surgical History         Past Surgical History:   Procedure Laterality Date    HERNIA REPAIR                Family History        Family History   Problem Relation Age of Onset    Hypertension Father           Medications         Current Outpatient Prescriptions on File Prior to Visit Medication Sig Dispense Refill    albuterol (PROVENTIL HFA,VENTOLIN HFA) 90 mcg/act inhaler Inhale 1 puff as needed for wheezing        amLODIPine (NORVASC) 10 mg tablet Take 10 mg by mouth daily        apixaban (ELIQUIS) 5 mg Take 5 mg by mouth 2 (two) times a day        aspirin (ECOTRIN LOW STRENGTH) 81 mg EC tablet Take 81 mg by mouth daily        atorvastatin (LIPITOR) 40 mg tablet Take 40 mg by mouth daily at bedtime        carvedilol (COREG) 12 5 mg tablet Take 12 5 mg by mouth 2 (two) times a day with meals        ciprofloxacin (CIPRO) 500 mg tablet Take 1 tablet (500 mg total) by mouth every 12 (twelve) hours for 11 days 22 tablet 0    glyBURIDE (DIABETA) 5 mg tablet Take 5 mg by mouth daily with breakfast        isosorbide mononitrate (IMDUR) 30 mg 24 hr tablet Take 60 mg by mouth daily        lisinopril (ZESTRIL) 40 mg tablet Take 40 mg by mouth 2 (two) times a day        metFORMIN (GLUCOPHAGE) 1000 MG tablet Take 1,000 mg by mouth 2 (two) times a day with meals        metroNIDAZOLE (FLAGYL) 500 mg tablet Take 1 tablet (500 mg total) by mouth every 8 (eight) hours for 11 days 33 tablet 0    mirtazapine (REMERON) 15 mg tablet Take 15 mg by mouth daily at bedtime        omeprazole (PriLOSEC) 20 mg delayed release capsule Take 20 mg by mouth daily        oxyCODONE-acetaminophen (PERCOCET) 5-325 mg per tablet Take 1 tablet by mouth every 4 (four) hours as needed for moderate pain for up to 10 days Max Daily Amount: 6 tablets 30 tablet 0    spironolactone (ALDACTONE) 25 mg tablet Take 25 mg by mouth daily          No current facility-administered medications on file prior to visit           Allergies  No Known Allergies     Review of Systems   Constitutional: Negative for activity change, appetite change, chills, diaphoresis, fatigue, fever and unexpected weight change     HENT: Negative for congestion, ear discharge, ear pain, facial swelling, hearing loss, nosebleeds, postnasal drip, rhinorrhea, sinus pain, sinus pressure, sneezing, sore throat, tinnitus, trouble swallowing and voice change  Eyes: Negative for photophobia, pain, discharge, redness, itching and visual disturbance  Respiratory: Negative for apnea, cough, choking, chest tightness, shortness of breath, wheezing and stridor  Cardiovascular: Negative for chest pain, palpitations and leg swelling  Gastrointestinal: Negative for abdominal distention, abdominal pain, anal bleeding, blood in stool, constipation, diarrhea, nausea, rectal pain and vomiting  Endocrine: Negative for cold intolerance, heat intolerance, polydipsia, polyphagia and polyuria  Genitourinary: Negative for decreased urine volume, difficulty urinating, dysuria, enuresis, flank pain, frequency, genital sores, hematuria and urgency  Musculoskeletal: Negative for arthralgias, back pain, gait problem, joint swelling, myalgias, neck pain and neck stiffness  Skin: Negative for color change, pallor, rash and wound  Allergic/Immunologic: Negative for environmental allergies, food allergies and immunocompromised state  Neurological: Negative for dizziness, tremors, seizures, syncope, facial asymmetry, speech difficulty, weakness, light-headedness, numbness and headaches  Hematological: Negative for adenopathy  Does not bruise/bleed easily  Psychiatric/Behavioral: Negative for agitation, behavioral problems, confusion, decreased concentration, dysphoric mood, hallucinations, self-injury, sleep disturbance and suicidal ideas  The patient is not nervous/anxious and is not hyperactive           Objective      Vitals:     11/26/18 1027   BP: 136/76   Temp: 97 8 °F (36 6 °C)         Physical Exam   Constitutional: He is oriented to person, place, and time  He appears well-developed and well-nourished  No distress  HENT:   Head: Normocephalic and atraumatic  Neck: Normal range of motion  Neck supple     Cardiovascular: Normal rate, regular rhythm and normal heart sounds  Exam reveals no gallop and no friction rub  No murmur heard  Pulmonary/Chest: Effort normal and breath sounds normal  No respiratory distress  He has no wheezes  He has no rales  Abdominal: Soft  Bowel sounds are normal  He exhibits no distension  There is tenderness    + RLQ and epigastric tenderness to palpation   Musculoskeletal: Normal range of motion  Neurological: He is alert and oriented to person, place, and time  Skin: Skin is warm and dry  He is not diaphoretic   No erythema

## 2018-11-26 NOTE — PROGRESS NOTES
Office Visit - General Surgery  Umair Grey MRN: [id number]  Encounter: [id number]    Assessment and Plan    Problem List Items Addressed This Visit        Digestive    Cholecystitis - Primary     53 y/o M, recently admitted w/ gallstone pancreatitis, now requiring interval laparoscopic cholecystectomy  --Will schedule laparoscopic cholecystectomy, possible open, possible IOC, after pt finishes his course of Abx  --Pt directed to hold Eliquis 48 hours PreOp  --Order IV Abx, PreOp labs  --If bilirubin and/or LFTs elevated, will proceed with additional intra-operative cholangiogram  --Cleared by Cardiology, pt denies any current Cocaine use                Chief Complaint:  Umair Grey is a 52 y o  male who presents for Cholelithiasis (Consult gallbladder)    Subjective    Pt is a 53 y/o M w/ recent admission for gallstone pancreatitis, who now presents for follow-up for scheduling an interval cholecystectomy  Of note, has 2-vessel CAD with 70-80% stenosis in each, s/p cardiac cath  Exercise stress test in 11/2018 was normal  Cardiology cleared pt for laparoscopic cholecystectomy on 11/15, but recommended continuing his beta blocker and statin post-operatively and holding Eliquis pre-op, but continuing it post-operatively  Currently, pt c/o intermittent epigastric pain and RLQ pain  Tolerating diet  Denies any N/V  Does have hx of GERD  Pt denies any current alcohol or drug use  Reports he last used Cocaine in 02/2018      Past Medical History  Past Medical History:   Diagnosis Date    CAD (coronary artery disease)     Hypertension        Past Surgical History  Past Surgical History:   Procedure Laterality Date    HERNIA REPAIR         Family History  Family History   Problem Relation Age of Onset    Hypertension Father        Medications  Current Outpatient Prescriptions on File Prior to Visit   Medication Sig Dispense Refill    albuterol (PROVENTIL HFA,VENTOLIN HFA) 90 mcg/act inhaler Inhale 1 puff as needed for wheezing      amLODIPine (NORVASC) 10 mg tablet Take 10 mg by mouth daily      apixaban (ELIQUIS) 5 mg Take 5 mg by mouth 2 (two) times a day      aspirin (ECOTRIN LOW STRENGTH) 81 mg EC tablet Take 81 mg by mouth daily      atorvastatin (LIPITOR) 40 mg tablet Take 40 mg by mouth daily at bedtime      carvedilol (COREG) 12 5 mg tablet Take 12 5 mg by mouth 2 (two) times a day with meals      ciprofloxacin (CIPRO) 500 mg tablet Take 1 tablet (500 mg total) by mouth every 12 (twelve) hours for 11 days 22 tablet 0    glyBURIDE (DIABETA) 5 mg tablet Take 5 mg by mouth daily with breakfast      isosorbide mononitrate (IMDUR) 30 mg 24 hr tablet Take 60 mg by mouth daily      lisinopril (ZESTRIL) 40 mg tablet Take 40 mg by mouth 2 (two) times a day      metFORMIN (GLUCOPHAGE) 1000 MG tablet Take 1,000 mg by mouth 2 (two) times a day with meals      metroNIDAZOLE (FLAGYL) 500 mg tablet Take 1 tablet (500 mg total) by mouth every 8 (eight) hours for 11 days 33 tablet 0    mirtazapine (REMERON) 15 mg tablet Take 15 mg by mouth daily at bedtime      omeprazole (PriLOSEC) 20 mg delayed release capsule Take 20 mg by mouth daily      oxyCODONE-acetaminophen (PERCOCET) 5-325 mg per tablet Take 1 tablet by mouth every 4 (four) hours as needed for moderate pain for up to 10 days Max Daily Amount: 6 tablets 30 tablet 0    spironolactone (ALDACTONE) 25 mg tablet Take 25 mg by mouth daily       No current facility-administered medications on file prior to visit  Allergies  No Known Allergies    Review of Systems   Constitutional: Negative for activity change, appetite change, chills, diaphoresis, fatigue, fever and unexpected weight change  HENT: Negative for congestion, ear discharge, ear pain, facial swelling, hearing loss, nosebleeds, postnasal drip, rhinorrhea, sinus pain, sinus pressure, sneezing, sore throat, tinnitus, trouble swallowing and voice change      Eyes: Negative for photophobia, pain, discharge, redness, itching and visual disturbance  Respiratory: Negative for apnea, cough, choking, chest tightness, shortness of breath, wheezing and stridor  Cardiovascular: Negative for chest pain, palpitations and leg swelling  Gastrointestinal: Negative for abdominal distention, abdominal pain, anal bleeding, blood in stool, constipation, diarrhea, nausea, rectal pain and vomiting  Endocrine: Negative for cold intolerance, heat intolerance, polydipsia, polyphagia and polyuria  Genitourinary: Negative for decreased urine volume, difficulty urinating, dysuria, enuresis, flank pain, frequency, genital sores, hematuria and urgency  Musculoskeletal: Negative for arthralgias, back pain, gait problem, joint swelling, myalgias, neck pain and neck stiffness  Skin: Negative for color change, pallor, rash and wound  Allergic/Immunologic: Negative for environmental allergies, food allergies and immunocompromised state  Neurological: Negative for dizziness, tremors, seizures, syncope, facial asymmetry, speech difficulty, weakness, light-headedness, numbness and headaches  Hematological: Negative for adenopathy  Does not bruise/bleed easily  Psychiatric/Behavioral: Negative for agitation, behavioral problems, confusion, decreased concentration, dysphoric mood, hallucinations, self-injury, sleep disturbance and suicidal ideas  The patient is not nervous/anxious and is not hyperactive  Objective  Vitals:    11/26/18 1027   BP: 136/76   Temp: 97 8 °F (36 6 °C)       Physical Exam   Constitutional: He is oriented to person, place, and time  He appears well-developed and well-nourished  No distress  HENT:   Head: Normocephalic and atraumatic  Neck: Normal range of motion  Neck supple  Cardiovascular: Normal rate, regular rhythm and normal heart sounds  Exam reveals no gallop and no friction rub  No murmur heard    Pulmonary/Chest: Effort normal and breath sounds normal  No respiratory distress  He has no wheezes  He has no rales  Abdominal: Soft  Bowel sounds are normal  He exhibits no distension  There is tenderness    + RLQ and epigastric tenderness to palpation   Musculoskeletal: Normal range of motion  Neurological: He is alert and oriented to person, place, and time  Skin: Skin is warm and dry  He is not diaphoretic  No erythema

## 2018-12-03 ENCOUNTER — OFFICE VISIT (OUTPATIENT)
Dept: SURGERY | Facility: CLINIC | Age: 53
End: 2018-12-03
Payer: COMMERCIAL

## 2018-12-03 ENCOUNTER — HOSPITAL ENCOUNTER (OUTPATIENT)
Facility: HOSPITAL | Age: 53
Setting detail: OBSERVATION
Discharge: HOME | End: 2018-12-06
Attending: SURGERY | Admitting: SURGERY
Payer: COMMERCIAL

## 2018-12-03 VITALS
DIASTOLIC BLOOD PRESSURE: 82 MMHG | HEIGHT: 74 IN | SYSTOLIC BLOOD PRESSURE: 140 MMHG | BODY MASS INDEX: 33.62 KG/M2 | WEIGHT: 262 LBS | TEMPERATURE: 98.4 F

## 2018-12-03 DIAGNOSIS — K81.9 CHOLECYSTITIS: Primary | ICD-10-CM

## 2018-12-03 LAB
ALBUMIN SERPL BCP-MCNC: 3.7 G/DL (ref 3.5–5)
ALP SERPL-CCNC: 96 U/L (ref 46–116)
ALT SERPL W P-5'-P-CCNC: 32 U/L (ref 12–78)
AMPHETAMINES SERPL QL SCN: NEGATIVE
ANION GAP SERPL CALCULATED.3IONS-SCNC: 6 MMOL/L (ref 4–13)
AST SERPL W P-5'-P-CCNC: 14 U/L (ref 5–45)
BARBITURATES UR QL: NEGATIVE
BENZODIAZ UR QL: NEGATIVE
BILIRUB SERPL-MCNC: 0.44 MG/DL (ref 0.2–1)
BUN SERPL-MCNC: 16 MG/DL (ref 5–25)
CALCIUM SERPL-MCNC: 8.7 MG/DL (ref 8.3–10.1)
CHLORIDE SERPL-SCNC: 104 MMOL/L (ref 100–108)
CO2 SERPL-SCNC: 27 MMOL/L (ref 21–32)
COCAINE UR QL: NEGATIVE
CREAT SERPL-MCNC: 1.17 MG/DL (ref 0.6–1.3)
ERYTHROCYTE [DISTWIDTH] IN BLOOD BY AUTOMATED COUNT: 11.9 % (ref 11.6–15.1)
GFR SERPL CREATININE-BSD FRML MDRD: 70 ML/MIN/1.73SQ M
GLUCOSE P FAST SERPL-MCNC: 181 MG/DL (ref 65–99)
GLUCOSE SERPL-MCNC: 181 MG/DL (ref 65–140)
HCT VFR BLD AUTO: 47.8 % (ref 36.5–49.3)
HGB BLD-MCNC: 16.7 G/DL (ref 12–17)
LIPASE SERPL-CCNC: 219 U/L (ref 73–393)
MCH RBC QN AUTO: 30.5 PG (ref 26.8–34.3)
MCHC RBC AUTO-ENTMCNC: 34.9 G/DL (ref 31.4–37.4)
MCV RBC AUTO: 87 FL (ref 82–98)
METHADONE UR QL: NEGATIVE
OPIATES UR QL SCN: POSITIVE
PCP UR QL: NEGATIVE
PLATELET # BLD AUTO: 276 THOUSANDS/UL (ref 149–390)
PMV BLD AUTO: 9.2 FL (ref 8.9–12.7)
POTASSIUM SERPL-SCNC: 4 MMOL/L (ref 3.5–5.3)
PROT SERPL-MCNC: 7.1 G/DL (ref 6.4–8.2)
RBC # BLD AUTO: 5.47 MILLION/UL (ref 3.88–5.62)
SODIUM SERPL-SCNC: 137 MMOL/L (ref 136–145)
THC UR QL: NEGATIVE
WBC # BLD AUTO: 6.18 THOUSAND/UL (ref 4.31–10.16)

## 2018-12-03 PROCEDURE — 83690 ASSAY OF LIPASE: CPT | Performed by: NURSE PRACTITIONER

## 2018-12-03 PROCEDURE — 99220 PR INITIAL OBSERVATION CARE/DAY 70 MINUTES: CPT | Performed by: NURSE PRACTITIONER

## 2018-12-03 PROCEDURE — 80053 COMPREHEN METABOLIC PANEL: CPT | Performed by: NURSE PRACTITIONER

## 2018-12-03 PROCEDURE — 99284 EMERGENCY DEPT VISIT MOD MDM: CPT

## 2018-12-03 PROCEDURE — 36415 COLL VENOUS BLD VENIPUNCTURE: CPT | Performed by: NURSE PRACTITIONER

## 2018-12-03 PROCEDURE — 93005 ELECTROCARDIOGRAM TRACING: CPT

## 2018-12-03 PROCEDURE — 99214 OFFICE O/P EST MOD 30 MIN: CPT | Performed by: SURGERY

## 2018-12-03 PROCEDURE — 80307 DRUG TEST PRSMV CHEM ANLYZR: CPT | Performed by: NURSE PRACTITIONER

## 2018-12-03 PROCEDURE — 85027 COMPLETE CBC AUTOMATED: CPT | Performed by: NURSE PRACTITIONER

## 2018-12-03 PROCEDURE — C9113 INJ PANTOPRAZOLE SODIUM, VIA: HCPCS | Performed by: NURSE PRACTITIONER

## 2018-12-03 RX ORDER — MIRTAZAPINE 15 MG/1
15 TABLET, FILM COATED ORAL
Status: DISCONTINUED | OUTPATIENT
Start: 2018-12-03 | End: 2018-12-06 | Stop reason: HOSPADM

## 2018-12-03 RX ORDER — CEFAZOLIN SODIUM 1 G/50ML
1000 SOLUTION INTRAVENOUS EVERY 8 HOURS
Status: DISCONTINUED | OUTPATIENT
Start: 2018-12-03 | End: 2018-12-04

## 2018-12-03 RX ORDER — PANTOPRAZOLE SODIUM 40 MG/1
40 INJECTION, POWDER, FOR SOLUTION INTRAVENOUS
Status: DISCONTINUED | OUTPATIENT
Start: 2018-12-03 | End: 2018-12-06 | Stop reason: HOSPADM

## 2018-12-03 RX ORDER — GLYBURIDE 5 MG/1
5 TABLET ORAL
Status: DISCONTINUED | OUTPATIENT
Start: 2018-12-04 | End: 2018-12-04

## 2018-12-03 RX ORDER — AMLODIPINE BESYLATE 10 MG/1
10 TABLET ORAL DAILY
Status: DISCONTINUED | OUTPATIENT
Start: 2018-12-04 | End: 2018-12-06 | Stop reason: HOSPADM

## 2018-12-03 RX ORDER — SPIRONOLACTONE 25 MG/1
25 TABLET ORAL DAILY
Status: DISCONTINUED | OUTPATIENT
Start: 2018-12-03 | End: 2018-12-06 | Stop reason: HOSPADM

## 2018-12-03 RX ORDER — ISOSORBIDE MONONITRATE 60 MG/1
60 TABLET, EXTENDED RELEASE ORAL DAILY
Status: DISCONTINUED | OUTPATIENT
Start: 2018-12-03 | End: 2018-12-06 | Stop reason: HOSPADM

## 2018-12-03 RX ORDER — SODIUM CHLORIDE 9 MG/ML
100 INJECTION, SOLUTION INTRAVENOUS CONTINUOUS
Status: DISCONTINUED | OUTPATIENT
Start: 2018-12-03 | End: 2018-12-04

## 2018-12-03 RX ORDER — OXYCODONE HYDROCHLORIDE 5 MG/1
10 TABLET ORAL EVERY 4 HOURS PRN
Status: DISCONTINUED | OUTPATIENT
Start: 2018-12-03 | End: 2018-12-06 | Stop reason: HOSPADM

## 2018-12-03 RX ORDER — LISINOPRIL 20 MG/1
40 TABLET ORAL EVERY 12 HOURS SCHEDULED
Status: DISCONTINUED | OUTPATIENT
Start: 2018-12-03 | End: 2018-12-06 | Stop reason: HOSPADM

## 2018-12-03 RX ORDER — ATORVASTATIN CALCIUM 40 MG/1
40 TABLET, FILM COATED ORAL
Status: DISCONTINUED | OUTPATIENT
Start: 2018-12-03 | End: 2018-12-06 | Stop reason: HOSPADM

## 2018-12-03 RX ORDER — OXYCODONE HYDROCHLORIDE 5 MG/1
5 TABLET ORAL EVERY 4 HOURS PRN
Status: DISCONTINUED | OUTPATIENT
Start: 2018-12-03 | End: 2018-12-06 | Stop reason: HOSPADM

## 2018-12-03 RX ORDER — CEFAZOLIN SODIUM 1 G/50ML
1000 SOLUTION INTRAVENOUS
Status: DISPENSED | OUTPATIENT
Start: 2018-12-04 | End: 2018-12-05

## 2018-12-03 RX ORDER — ALBUTEROL SULFATE 90 UG/1
1 AEROSOL, METERED RESPIRATORY (INHALATION) AS NEEDED
Status: DISCONTINUED | OUTPATIENT
Start: 2018-12-03 | End: 2018-12-06 | Stop reason: HOSPADM

## 2018-12-03 RX ORDER — CARVEDILOL 12.5 MG/1
12.5 TABLET ORAL 2 TIMES DAILY WITH MEALS
Status: DISCONTINUED | OUTPATIENT
Start: 2018-12-03 | End: 2018-12-06 | Stop reason: HOSPADM

## 2018-12-03 RX ADMIN — CEFAZOLIN SODIUM 1000 MG: 1 SOLUTION INTRAVENOUS at 13:50

## 2018-12-03 RX ADMIN — PANTOPRAZOLE SODIUM 40 MG: 40 INJECTION, POWDER, FOR SOLUTION INTRAVENOUS at 16:14

## 2018-12-03 RX ADMIN — OXYCODONE HYDROCHLORIDE 10 MG: 10 TABLET ORAL at 19:51

## 2018-12-03 RX ADMIN — LISINOPRIL 40 MG: 20 TABLET ORAL at 21:36

## 2018-12-03 RX ADMIN — CEFAZOLIN SODIUM 1000 MG: 1 SOLUTION INTRAVENOUS at 21:36

## 2018-12-03 RX ADMIN — CARVEDILOL 12.5 MG: 12.5 TABLET, FILM COATED ORAL at 16:27

## 2018-12-03 RX ADMIN — MIRTAZAPINE 15 MG: 15 TABLET ORAL at 21:36

## 2018-12-03 RX ADMIN — SODIUM CHLORIDE 100 ML/HR: 0.9 INJECTION, SOLUTION INTRAVENOUS at 13:49

## 2018-12-03 RX ADMIN — OXYCODONE HYDROCHLORIDE 10 MG: 10 TABLET ORAL at 23:56

## 2018-12-03 RX ADMIN — ATORVASTATIN CALCIUM 40 MG: 40 TABLET, FILM COATED ORAL at 21:36

## 2018-12-03 RX ADMIN — METRONIDAZOLE 500 MG: 500 INJECTION, SOLUTION INTRAVENOUS at 16:17

## 2018-12-03 RX ADMIN — OXYCODONE HYDROCHLORIDE 10 MG: 10 TABLET ORAL at 15:24

## 2018-12-03 RX ADMIN — ISOSORBIDE MONONITRATE 60 MG: 60 TABLET, EXTENDED RELEASE ORAL at 15:23

## 2018-12-03 RX ADMIN — SPIRONOLACTONE 25 MG: 25 TABLET ORAL at 16:19

## 2018-12-03 RX ADMIN — SODIUM CHLORIDE 100 ML/HR: 9 INJECTION, SOLUTION INTRAVENOUS at 16:17

## 2018-12-03 RX ADMIN — METRONIDAZOLE 500 MG: 500 INJECTION, SOLUTION INTRAVENOUS at 21:36

## 2018-12-03 NOTE — PROGRESS NOTES
Office Visit - General Surgery  Christophe Pacheco MRN: [id number]  Encounter: [id number]    Assessment and Plan  A: 53M with recent history of gallstone pancreatitis and cholelithiasis presents with continued nausea and vomiting    P: Patient will go to the ED for IV hydration and lab work with admission overnight to observe his symptoms      Chief Complaint:  Christophe Pacheco is a 52 y o  male who presents for Cholelithiasis (Patient's symptoms exasperated)    Subjective  Patient is having worsening nausea and vomiting since 11/29  He was scheduled for an elective cholecystectomy in January  He presents with continued pain and an 8 pound weight loss in that time frame  He had 2 days of fevers and chills  He is not able to eat and fears food  He called on 11/30, but did not receive a call back  Today, he presents for evaluation       Past Medical History  Past Medical History:   Diagnosis Date    CAD (coronary artery disease)     Hypertension        Past Surgical History  Past Surgical History:   Procedure Laterality Date    HERNIA REPAIR         Family History  Family History   Problem Relation Age of Onset    Hypertension Father        Medications  Current Outpatient Prescriptions on File Prior to Visit   Medication Sig Dispense Refill    albuterol (PROVENTIL HFA,VENTOLIN HFA) 90 mcg/act inhaler Inhale 1 puff as needed for wheezing      amLODIPine (NORVASC) 10 mg tablet Take 10 mg by mouth daily      apixaban (ELIQUIS) 5 mg Take 5 mg by mouth 2 (two) times a day      aspirin (ECOTRIN LOW STRENGTH) 81 mg EC tablet Take 81 mg by mouth daily      atorvastatin (LIPITOR) 40 mg tablet Take 40 mg by mouth daily at bedtime      carvedilol (COREG) 12 5 mg tablet Take 12 5 mg by mouth 2 (two) times a day with meals      glyBURIDE (DIABETA) 5 mg tablet Take 5 mg by mouth daily with breakfast      isosorbide mononitrate (IMDUR) 30 mg 24 hr tablet Take 60 mg by mouth daily      lisinopril (ZESTRIL) 40 mg tablet Take 40 mg by mouth 2 (two) times a day      metFORMIN (GLUCOPHAGE) 1000 MG tablet Take 1,000 mg by mouth 2 (two) times a day with meals      mirtazapine (REMERON) 15 mg tablet Take 15 mg by mouth daily at bedtime      omeprazole (PriLOSEC) 20 mg delayed release capsule Take 20 mg by mouth daily      spironolactone (ALDACTONE) 25 mg tablet Take 25 mg by mouth daily       No current facility-administered medications on file prior to visit  Allergies  No Known Allergies    Review of Systems   Constitutional: Negative  Negative for activity change and appetite change  HENT: Negative  Negative for hearing loss and trouble swallowing  Eyes: Negative  Negative for photophobia and redness  Respiratory: Negative  Negative for chest tightness and shortness of breath  Cardiovascular: Negative  Negative for chest pain and palpitations  Gastrointestinal: Positive for abdominal pain, nausea and vomiting  Negative for abdominal distention  Endocrine: Negative  Negative for cold intolerance and heat intolerance  Genitourinary: Negative  Negative for flank pain and genital sores  Musculoskeletal: Negative  Negative for arthralgias and back pain  Skin: Negative  Negative for color change and pallor  Allergic/Immunologic: Negative  Neurological: Negative  Negative for facial asymmetry, speech difficulty and light-headedness  Hematological: Negative  Negative for adenopathy  Psychiatric/Behavioral: Negative  Negative for agitation and behavioral problems  Objective  Vitals:    12/03/18 1130   BP: 140/82   Temp: 98 4 °F (36 9 °C)       Physical Exam   Constitutional: He is oriented to person, place, and time  He appears well-developed and well-nourished  No distress  HENT:   Head: Normocephalic and atraumatic  Right Ear: External ear normal    Left Ear: External ear normal    Eyes: Pupils are equal, round, and reactive to light   Conjunctivae and EOM are normal  Right eye exhibits no discharge  Left eye exhibits no discharge  No scleral icterus  Neck: No tracheal deviation present  Cardiovascular: Normal rate and intact distal pulses  Pulmonary/Chest: Effort normal  No stridor  No respiratory distress  He exhibits tenderness  Abdominal: Soft  He exhibits no distension  There is tenderness  There is positive Jimenez's sign  Musculoskeletal: Normal range of motion  He exhibits no edema or deformity  Neurological: He is alert and oriented to person, place, and time  No cranial nerve deficit  Skin: Skin is warm and dry  No rash noted  He is not diaphoretic  No erythema  Vitals reviewed

## 2018-12-03 NOTE — H&P
H&P Exam - Bianca Velez 52 y o  male MRN: [id number]    Unit/Bed#: ED 15 Encounter: [id number]    Assessment:  Seen in outpatient office today, complaining of right upper quadrant pain, vomiting for 4 days, dehydrated  Sent to ED for private exam and admission  Acute cholecystitis  Recent Pancreatitis    Plan:  Admit to Observation  Ancef and Flagyl  Pain medication  IV fluids for hydration  DVT prophylaxis    History of Present Illness State 4 days ago on Friday night he had the sweats and was vomiting  Occasionally able to keep some fluids in  Continued with pain and intermittent vomiting over the following three days  He states he had a normal BM yesterday, able to void without difficulty  Does feel hungry but has pain  Came to outpatient office and was sent to ED  No shortness of breath and no chest pain  Review of Systems   Constitutional: Positive for appetite change and diaphoresis  HENT: Negative  Eyes: Negative  Respiratory: Negative  Cardiovascular: Negative  Gastrointestinal: Positive for abdominal pain, nausea and vomiting  Endocrine: Negative  Genitourinary: Negative  Musculoskeletal: Negative  Skin: Negative  Allergic/Immunologic: Negative  Neurological: Negative  Hematological: Negative  Psychiatric/Behavioral: Negative          Historical Information   Past Medical History:   Diagnosis Date    CAD (coronary artery disease)     Diabetes mellitus ([facility] [address] )     Hypertension      Past Surgical History:   Procedure Laterality Date    HERNIA REPAIR       Social History   History   Alcohol Use    Yes     Comment: social     History   Drug Use No     History   Smoking Status    Never Smoker   Smokeless Tobacco    Never Used     Family History: non-contributory    Meds/Allergies   all medications and allergies reviewed  No Known Allergies    Objective   First Vitals:   Blood Pressure: (!) 195/134 (12/03/18 1255)  Pulse: 79 (12/03/18 1255)  Temperature: Franklyn Pike ) 97 4 °F (36 3 °C) (12/03/18 1255)  Temp Source: Oral (12/03/18 1255)  Respirations: 18 (12/03/18 1255)  SpO2: 96 % (12/03/18 1255)    Current Vitals:   Blood Pressure: (!) 195/134 (12/03/18 1255)  Pulse: 79 (12/03/18 1255)  Temperature: (!) 97 4 °F (36 3 °C) (12/03/18 1255)  Temp Source: Oral (12/03/18 1255)  Respirations: 18 (12/03/18 1255)  SpO2: 96 % (12/03/18 1255)    No intake or output data in the 24 hours ending 12/03/18 1323    Invasive Devices          No matching active lines, drains, or airways          Physical Exam   Constitutional: He is oriented to person, place, and time  He appears well-developed and well-nourished  HENT:   Head: Normocephalic and atraumatic  Nose: Nose normal    Mouth/Throat: Oropharynx is clear and moist  No oropharyngeal exudate  Eyes: Pupils are equal, round, and reactive to light  EOM are normal  Right eye exhibits no discharge  Left eye exhibits no discharge  No scleral icterus  Neck: Normal range of motion  Neck supple  No JVD present  No tracheal deviation present  No thyromegaly present  Cardiovascular: Normal rate, regular rhythm, normal heart sounds and intact distal pulses  Exam reveals no gallop and no friction rub  No murmur heard  Pulmonary/Chest: Effort normal and breath sounds normal  No respiratory distress  He has no wheezes  He has no rales  He exhibits no tenderness  Abdominal: Soft  He exhibits no distension and no mass  There is tenderness  There is no rebound and no guarding  Musculoskeletal: Normal range of motion  He exhibits no edema, tenderness or deformity  Lymphadenopathy:     He has no cervical adenopathy  Neurological: He is alert and oriented to person, place, and time  He displays normal reflexes  No cranial nerve deficit  He exhibits normal muscle tone  Coordination normal    Skin: Skin is warm and dry  No rash noted  He is not diaphoretic  No erythema  No pallor  Psychiatric: He has a normal mood and affect   His behavior is normal  Judgment and thought content normal        Lab Results: Results for Lubna Strickland (MRN [id number]) as of 12/3/2018 15:57   Ref  Range 12/3/2018 13:34   eGFR Latest Units: ml/min/1 73sq m 70   Sodium Latest Ref Range: 136 - 145 mmol/L 137   Potassium Latest Ref Range: 3 5 - 5 3 mmol/L 4 0   Chloride Latest Ref Range: 100 - 108 mmol/L 104   CO2 Latest Ref Range: 21 - 32 mmol/L 27   Anion Gap Latest Ref Range: 4 - 13 mmol/L 6   BUN Latest Ref Range: 5 - 25 mg/dL 16   Creatinine Latest Ref Range: 0 60 - 1 30 mg/dL 1 17   Glucose, Random Latest Ref Range: 65 - 140 mg/dL 181 (H)   GLUCOSE FASTING Latest Ref Range: 65 - 99 mg/dL 181 (H)   Calcium Latest Ref Range: 8 3 - 10 1 mg/dL 8 7   AST Latest Ref Range: 5 - 45 U/L 14   ALT Latest Ref Range: 12 - 78 U/L 32   Alkaline Phosphatase Latest Ref Range: 46 - 116 U/L 96   Total Protein Latest Ref Range: 6 4 - 8 2 g/dL 7 1   Albumin Latest Ref Range: 3 5 - 5 0 g/dL 3 7   TOTAL BILIRUBIN Latest Ref Range: 0 20 - 1 00 mg/dL 0 44   Lipase Latest Ref Range: 73 - 393 u/L 219   WBC Latest Ref Range: 4 31 - 10 16 Thousand/uL 6 18   Red Blood Cell Count Latest Ref Range: 3 88 - 5 62 Million/uL 5 47   Hemoglobin Latest Ref Range: 12 0 - 17 0 g/dL 16 7   HCT Latest Ref Range: 36 5 - 49 3 % 47 8   MCV Latest Ref Range: 82 - 98 fL 87   MCH Latest Ref Range: 26 8 - 34 3 pg 30 5   MCHC Latest Ref Range: 31 4 - 37 4 g/dL 34 9   RDW Latest Ref Range: 11 6 - 15 1 % 11 9   Platelet Count Latest Ref Range: 149 - 390 Thousands/uL 276   MPV Latest Ref Range: 8 9 - 12 7 fL 9 2     Imagin/15 US gallbladder - Cholelithiasis with mild wall thickening  No pericholecystic fluid  Sonographic Jimenez sign cannot be adequately assessed due to patient had received pain medication         2  No sonographic evidence of choledocholithiasis         3  Simple cyst in the midpole the right kidney    EKG, Pathology, and Other Studies: none    Code Status: Prior  Advance Directive and Living Will:      Power of :    POLST:      Counseling / Coordination of Care: Total floor / unit time spent today 25 minutes  Nurse / Provider rounds with staff nurseMarilia and patient  Will admit for observation and possible surgical intervention  IV fluids for hydration and will remain NPO except for meds

## 2018-12-03 NOTE — PLAN OF CARE
DISCHARGE PLANNING     Discharge to home or other facility with appropriate resources Progressing        GASTROINTESTINAL - ADULT     Minimal or absence of nausea and/or vomiting Progressing     Maintains or returns to baseline bowel function Progressing     Maintains adequate nutritional intake Progressing        INFECTION - ADULT     Absence or prevention of progression during hospitalization Progressing     Absence of fever/infection during neutropenic period Progressing        Knowledge Deficit     Patient/family/caregiver demonstrates understanding of disease process, treatment plan, medications, and discharge instructions Progressing        METABOLIC, FLUID AND ELECTROLYTES - ADULT     Electrolytes maintained within normal limits Progressing     Fluid balance maintained Progressing        Nutrition/Hydration-ADULT     Nutrient/Hydration intake appropriate for improving, restoring or maintaining nutritional needs Progressing        PAIN - ADULT     Verbalizes/displays adequate comfort level or baseline comfort level Progressing        Potential for Falls     Patient will remain free of falls Progressing        SAFETY ADULT     Maintain or return to baseline ADL function Progressing     Maintain or return mobility status to optimal level Progressing

## 2018-12-04 ENCOUNTER — APPOINTMENT (OUTPATIENT)
Dept: RADIOLOGY | Facility: HOSPITAL | Age: 53
End: 2018-12-04
Payer: COMMERCIAL

## 2018-12-04 ENCOUNTER — ANESTHESIA (OUTPATIENT)
Dept: PERIOP | Facility: HOSPITAL | Age: 53
End: 2018-12-04
Payer: COMMERCIAL

## 2018-12-04 ENCOUNTER — ANESTHESIA EVENT (OUTPATIENT)
Dept: PERIOP | Facility: HOSPITAL | Age: 53
End: 2018-12-04
Payer: COMMERCIAL

## 2018-12-04 LAB
ABO GROUP BLD: NORMAL
ALBUMIN SERPL BCP-MCNC: 3 G/DL (ref 3.5–5)
ALP SERPL-CCNC: 79 U/L (ref 46–116)
ALT SERPL W P-5'-P-CCNC: 24 U/L (ref 12–78)
ANION GAP SERPL CALCULATED.3IONS-SCNC: 6 MMOL/L (ref 4–13)
AST SERPL W P-5'-P-CCNC: 9 U/L (ref 5–45)
BASOPHILS # BLD AUTO: 0.08 THOUSANDS/ΜL (ref 0–0.1)
BASOPHILS NFR BLD AUTO: 1 % (ref 0–1)
BILIRUB SERPL-MCNC: 0.52 MG/DL (ref 0.2–1)
BLD GP AB SCN SERPL QL: NEGATIVE
BUN SERPL-MCNC: 14 MG/DL (ref 5–25)
CALCIUM SERPL-MCNC: 8.2 MG/DL (ref 8.3–10.1)
CHLORIDE SERPL-SCNC: 107 MMOL/L (ref 100–108)
CO2 SERPL-SCNC: 27 MMOL/L (ref 21–32)
CREAT SERPL-MCNC: 1.24 MG/DL (ref 0.6–1.3)
EOSINOPHIL # BLD AUTO: 0.35 THOUSAND/ΜL (ref 0–0.61)
EOSINOPHIL NFR BLD AUTO: 5 % (ref 0–6)
ERYTHROCYTE [DISTWIDTH] IN BLOOD BY AUTOMATED COUNT: 11.9 % (ref 11.6–15.1)
GFR SERPL CREATININE-BSD FRML MDRD: 65 ML/MIN/1.73SQ M
GLUCOSE SERPL-MCNC: 141 MG/DL (ref 65–140)
GLUCOSE SERPL-MCNC: 168 MG/DL (ref 65–140)
GLUCOSE SERPL-MCNC: 177 MG/DL (ref 65–140)
GLUCOSE SERPL-MCNC: 182 MG/DL (ref 65–140)
GLUCOSE SERPL-MCNC: 199 MG/DL (ref 65–140)
HCT VFR BLD AUTO: 41.7 % (ref 36.5–49.3)
HGB BLD-MCNC: 14.3 G/DL (ref 12–17)
IMM GRANULOCYTES # BLD AUTO: 0.02 THOUSAND/UL (ref 0–0.2)
IMM GRANULOCYTES NFR BLD AUTO: 0 % (ref 0–2)
LYMPHOCYTES # BLD AUTO: 2.37 THOUSANDS/ΜL (ref 0.6–4.47)
LYMPHOCYTES NFR BLD AUTO: 35 % (ref 14–44)
MCH RBC QN AUTO: 30.4 PG (ref 26.8–34.3)
MCHC RBC AUTO-ENTMCNC: 34.3 G/DL (ref 31.4–37.4)
MCV RBC AUTO: 89 FL (ref 82–98)
MONOCYTES # BLD AUTO: 0.66 THOUSAND/ΜL (ref 0.17–1.22)
MONOCYTES NFR BLD AUTO: 10 % (ref 4–12)
NEUTROPHILS # BLD AUTO: 3.26 THOUSANDS/ΜL (ref 1.85–7.62)
NEUTS SEG NFR BLD AUTO: 49 % (ref 43–75)
NRBC BLD AUTO-RTO: 0 /100 WBCS
PLATELET # BLD AUTO: 237 THOUSANDS/UL (ref 149–390)
PMV BLD AUTO: 9.5 FL (ref 8.9–12.7)
POTASSIUM SERPL-SCNC: 3.7 MMOL/L (ref 3.5–5.3)
PROT SERPL-MCNC: 5.9 G/DL (ref 6.4–8.2)
RBC # BLD AUTO: 4.7 MILLION/UL (ref 3.88–5.62)
RH BLD: POSITIVE
SODIUM SERPL-SCNC: 140 MMOL/L (ref 136–145)
SPECIMEN EXPIRATION DATE: NORMAL
WBC # BLD AUTO: 6.74 THOUSAND/UL (ref 4.31–10.16)

## 2018-12-04 PROCEDURE — C9113 INJ PANTOPRAZOLE SODIUM, VIA: HCPCS | Performed by: NURSE PRACTITIONER

## 2018-12-04 PROCEDURE — 86901 BLOOD TYPING SEROLOGIC RH(D): CPT | Performed by: SURGERY

## 2018-12-04 PROCEDURE — 99024 POSTOP FOLLOW-UP VISIT: CPT | Performed by: NURSE PRACTITIONER

## 2018-12-04 PROCEDURE — 85025 COMPLETE CBC W/AUTO DIFF WBC: CPT | Performed by: SURGERY

## 2018-12-04 PROCEDURE — 86850 RBC ANTIBODY SCREEN: CPT | Performed by: SURGERY

## 2018-12-04 PROCEDURE — 80053 COMPREHEN METABOLIC PANEL: CPT | Performed by: SURGERY

## 2018-12-04 PROCEDURE — 82948 REAGENT STRIP/BLOOD GLUCOSE: CPT

## 2018-12-04 PROCEDURE — 86900 BLOOD TYPING SEROLOGIC ABO: CPT | Performed by: SURGERY

## 2018-12-04 PROCEDURE — 88304 TISSUE EXAM BY PATHOLOGIST: CPT | Performed by: PATHOLOGY

## 2018-12-04 PROCEDURE — 47562 LAPAROSCOPIC CHOLECYSTECTOMY: CPT | Performed by: SURGERY

## 2018-12-04 RX ORDER — ROCURONIUM BROMIDE 10 MG/ML
INJECTION, SOLUTION INTRAVENOUS AS NEEDED
Status: DISCONTINUED | OUTPATIENT
Start: 2018-12-04 | End: 2018-12-04 | Stop reason: SURG

## 2018-12-04 RX ORDER — GLYCOPYRROLATE 0.2 MG/ML
INJECTION INTRAMUSCULAR; INTRAVENOUS AS NEEDED
Status: DISCONTINUED | OUTPATIENT
Start: 2018-12-04 | End: 2018-12-04 | Stop reason: SURG

## 2018-12-04 RX ORDER — MEPERIDINE HYDROCHLORIDE 25 MG/ML
12.5 INJECTION INTRAMUSCULAR; INTRAVENOUS; SUBCUTANEOUS ONCE AS NEEDED
Status: DISCONTINUED | OUTPATIENT
Start: 2018-12-04 | End: 2018-12-04 | Stop reason: HOSPADM

## 2018-12-04 RX ORDER — MAGNESIUM HYDROXIDE 1200 MG/15ML
LIQUID ORAL AS NEEDED
Status: DISCONTINUED | OUTPATIENT
Start: 2018-12-04 | End: 2018-12-04 | Stop reason: HOSPADM

## 2018-12-04 RX ORDER — ONDANSETRON 2 MG/ML
4 INJECTION INTRAMUSCULAR; INTRAVENOUS ONCE AS NEEDED
Status: DISCONTINUED | OUTPATIENT
Start: 2018-12-04 | End: 2018-12-04 | Stop reason: HOSPADM

## 2018-12-04 RX ORDER — HEPARIN SODIUM 5000 [USP'U]/ML
5000 INJECTION, SOLUTION INTRAVENOUS; SUBCUTANEOUS EVERY 8 HOURS SCHEDULED
Status: DISCONTINUED | OUTPATIENT
Start: 2018-12-05 | End: 2018-12-05

## 2018-12-04 RX ORDER — SODIUM CHLORIDE, SODIUM LACTATE, POTASSIUM CHLORIDE, CALCIUM CHLORIDE 600; 310; 30; 20 MG/100ML; MG/100ML; MG/100ML; MG/100ML
INJECTION, SOLUTION INTRAVENOUS CONTINUOUS PRN
Status: DISCONTINUED | OUTPATIENT
Start: 2018-12-04 | End: 2018-12-04 | Stop reason: SURG

## 2018-12-04 RX ORDER — METOCLOPRAMIDE HYDROCHLORIDE 5 MG/ML
10 INJECTION INTRAMUSCULAR; INTRAVENOUS ONCE AS NEEDED
Status: DISCONTINUED | OUTPATIENT
Start: 2018-12-04 | End: 2018-12-04 | Stop reason: HOSPADM

## 2018-12-04 RX ORDER — LIDOCAINE HYDROCHLORIDE 10 MG/ML
INJECTION, SOLUTION INFILTRATION; PERINEURAL AS NEEDED
Status: DISCONTINUED | OUTPATIENT
Start: 2018-12-04 | End: 2018-12-04 | Stop reason: SURG

## 2018-12-04 RX ORDER — HYDROMORPHONE HCL/PF 1 MG/ML
1 SYRINGE (ML) INJECTION
Status: DISCONTINUED | OUTPATIENT
Start: 2018-12-04 | End: 2018-12-06 | Stop reason: HOSPADM

## 2018-12-04 RX ORDER — HYDRALAZINE HYDROCHLORIDE 20 MG/ML
10 INJECTION INTRAMUSCULAR; INTRAVENOUS ONCE
Status: COMPLETED | OUTPATIENT
Start: 2018-12-04 | End: 2018-12-04

## 2018-12-04 RX ORDER — NEOSTIGMINE METHYLSULFATE 1 MG/ML
INJECTION INTRAVENOUS AS NEEDED
Status: DISCONTINUED | OUTPATIENT
Start: 2018-12-04 | End: 2018-12-04 | Stop reason: SURG

## 2018-12-04 RX ORDER — HYDROMORPHONE HCL/PF 1 MG/ML
0.2 SYRINGE (ML) INJECTION
Status: DISCONTINUED | OUTPATIENT
Start: 2018-12-04 | End: 2018-12-04 | Stop reason: HOSPADM

## 2018-12-04 RX ORDER — BUPIVACAINE HYDROCHLORIDE AND EPINEPHRINE 5; 5 MG/ML; UG/ML
INJECTION, SOLUTION PERINEURAL AS NEEDED
Status: DISCONTINUED | OUTPATIENT
Start: 2018-12-04 | End: 2018-12-04 | Stop reason: HOSPADM

## 2018-12-04 RX ORDER — SUCCINYLCHOLINE CHLORIDE 20 MG/ML
INJECTION INTRAMUSCULAR; INTRAVENOUS AS NEEDED
Status: DISCONTINUED | OUTPATIENT
Start: 2018-12-04 | End: 2018-12-04 | Stop reason: SURG

## 2018-12-04 RX ORDER — SODIUM CHLORIDE, SODIUM LACTATE, POTASSIUM CHLORIDE, CALCIUM CHLORIDE 600; 310; 30; 20 MG/100ML; MG/100ML; MG/100ML; MG/100ML
50 INJECTION, SOLUTION INTRAVENOUS CONTINUOUS
Status: DISCONTINUED | OUTPATIENT
Start: 2018-12-04 | End: 2018-12-05

## 2018-12-04 RX ORDER — PROPOFOL 10 MG/ML
INJECTION, EMULSION INTRAVENOUS AS NEEDED
Status: DISCONTINUED | OUTPATIENT
Start: 2018-12-04 | End: 2018-12-04 | Stop reason: SURG

## 2018-12-04 RX ORDER — FENTANYL CITRATE/PF 50 MCG/ML
50 SYRINGE (ML) INJECTION
Status: DISCONTINUED | OUTPATIENT
Start: 2018-12-04 | End: 2018-12-04 | Stop reason: HOSPADM

## 2018-12-04 RX ORDER — FENTANYL CITRATE 50 UG/ML
INJECTION, SOLUTION INTRAMUSCULAR; INTRAVENOUS AS NEEDED
Status: DISCONTINUED | OUTPATIENT
Start: 2018-12-04 | End: 2018-12-04 | Stop reason: SURG

## 2018-12-04 RX ORDER — METOCLOPRAMIDE HYDROCHLORIDE 5 MG/ML
INJECTION INTRAMUSCULAR; INTRAVENOUS AS NEEDED
Status: DISCONTINUED | OUTPATIENT
Start: 2018-12-04 | End: 2018-12-04 | Stop reason: SURG

## 2018-12-04 RX ORDER — HYDROMORPHONE HYDROCHLORIDE 2 MG/ML
INJECTION, SOLUTION INTRAMUSCULAR; INTRAVENOUS; SUBCUTANEOUS AS NEEDED
Status: DISCONTINUED | OUTPATIENT
Start: 2018-12-04 | End: 2018-12-04 | Stop reason: SURG

## 2018-12-04 RX ORDER — ONDANSETRON 2 MG/ML
INJECTION INTRAMUSCULAR; INTRAVENOUS AS NEEDED
Status: DISCONTINUED | OUTPATIENT
Start: 2018-12-04 | End: 2018-12-04 | Stop reason: SURG

## 2018-12-04 RX ORDER — MIDAZOLAM HYDROCHLORIDE 1 MG/ML
INJECTION INTRAMUSCULAR; INTRAVENOUS AS NEEDED
Status: DISCONTINUED | OUTPATIENT
Start: 2018-12-04 | End: 2018-12-04 | Stop reason: SURG

## 2018-12-04 RX ADMIN — FENTANYL CITRATE 50 MCG: 50 INJECTION, SOLUTION INTRAMUSCULAR; INTRAVENOUS at 18:40

## 2018-12-04 RX ADMIN — OXYCODONE HYDROCHLORIDE 10 MG: 10 TABLET ORAL at 09:06

## 2018-12-04 RX ADMIN — NEOSTIGMINE METHYLSULFATE 4 MG: 1 INJECTION INTRAVENOUS at 17:31

## 2018-12-04 RX ADMIN — HYDROMORPHONE HYDROCHLORIDE 1 MG: 1 INJECTION, SOLUTION INTRAMUSCULAR; INTRAVENOUS; SUBCUTANEOUS at 20:30

## 2018-12-04 RX ADMIN — CEFAZOLIN SODIUM 1000 MG: 1 SOLUTION INTRAVENOUS at 05:07

## 2018-12-04 RX ADMIN — HYDROMORPHONE HYDROCHLORIDE 0.5 MG: 2 INJECTION, SOLUTION INTRAMUSCULAR; INTRAVENOUS; SUBCUTANEOUS at 17:40

## 2018-12-04 RX ADMIN — METOCLOPRAMIDE 10 MG: 5 INJECTION, SOLUTION INTRAMUSCULAR; INTRAVENOUS at 17:19

## 2018-12-04 RX ADMIN — LIDOCAINE HYDROCHLORIDE 20 MG: 10 INJECTION, SOLUTION INFILTRATION; PERINEURAL at 15:03

## 2018-12-04 RX ADMIN — LISINOPRIL 40 MG: 20 TABLET ORAL at 20:35

## 2018-12-04 RX ADMIN — SODIUM CHLORIDE, SODIUM LACTATE, POTASSIUM CHLORIDE, AND CALCIUM CHLORIDE 50 ML/HR: .6; .31; .03; .02 INJECTION, SOLUTION INTRAVENOUS at 18:57

## 2018-12-04 RX ADMIN — INSULIN LISPRO 2 UNITS: 100 INJECTION, SOLUTION INTRAVENOUS; SUBCUTANEOUS at 21:22

## 2018-12-04 RX ADMIN — PANTOPRAZOLE SODIUM 40 MG: 40 INJECTION, POWDER, FOR SOLUTION INTRAVENOUS at 12:01

## 2018-12-04 RX ADMIN — OXYCODONE HYDROCHLORIDE 10 MG: 10 TABLET ORAL at 21:28

## 2018-12-04 RX ADMIN — ROCURONIUM BROMIDE 50 MG: 10 INJECTION INTRAVENOUS at 15:03

## 2018-12-04 RX ADMIN — AMLODIPINE BESYLATE 10 MG: 10 TABLET ORAL at 08:58

## 2018-12-04 RX ADMIN — SODIUM CHLORIDE, SODIUM LACTATE, POTASSIUM CHLORIDE, AND CALCIUM CHLORIDE: .6; .31; .03; .02 INJECTION, SOLUTION INTRAVENOUS at 14:50

## 2018-12-04 RX ADMIN — FENTANYL CITRATE 50 MCG: 50 INJECTION, SOLUTION INTRAMUSCULAR; INTRAVENOUS at 18:08

## 2018-12-04 RX ADMIN — SUCCINYLCHOLINE CHLORIDE 160 MG: 20 INJECTION, SOLUTION INTRAMUSCULAR; INTRAVENOUS at 15:03

## 2018-12-04 RX ADMIN — METRONIDAZOLE 500 MG: 500 INJECTION, SOLUTION INTRAVENOUS at 14:50

## 2018-12-04 RX ADMIN — ATORVASTATIN CALCIUM 40 MG: 40 TABLET, FILM COATED ORAL at 21:20

## 2018-12-04 RX ADMIN — LISINOPRIL 40 MG: 20 TABLET ORAL at 08:58

## 2018-12-04 RX ADMIN — ROCURONIUM BROMIDE 30 MG: 10 INJECTION INTRAVENOUS at 16:51

## 2018-12-04 RX ADMIN — CARVEDILOL 12.5 MG: 12.5 TABLET, FILM COATED ORAL at 08:58

## 2018-12-04 RX ADMIN — ONDANSETRON 4 MG: 2 INJECTION INTRAMUSCULAR; INTRAVENOUS at 17:19

## 2018-12-04 RX ADMIN — ISOSORBIDE MONONITRATE 60 MG: 60 TABLET, EXTENDED RELEASE ORAL at 08:58

## 2018-12-04 RX ADMIN — MIRTAZAPINE 15 MG: 15 TABLET ORAL at 21:20

## 2018-12-04 RX ADMIN — OXYCODONE HYDROCHLORIDE 10 MG: 10 TABLET ORAL at 13:38

## 2018-12-04 RX ADMIN — FENTANYL CITRATE 50 MCG: 50 INJECTION, SOLUTION INTRAMUSCULAR; INTRAVENOUS at 15:03

## 2018-12-04 RX ADMIN — GLYCOPYRROLATE 0.8 MG: 0.2 INJECTION, SOLUTION INTRAMUSCULAR; INTRAVENOUS at 17:31

## 2018-12-04 RX ADMIN — HYDROMORPHONE HYDROCHLORIDE 0.5 MG: 2 INJECTION, SOLUTION INTRAMUSCULAR; INTRAVENOUS; SUBCUTANEOUS at 17:28

## 2018-12-04 RX ADMIN — HYDRALAZINE HYDROCHLORIDE 10 MG: 20 INJECTION INTRAMUSCULAR; INTRAVENOUS at 20:33

## 2018-12-04 RX ADMIN — FENTANYL CITRATE 50 MCG: 50 INJECTION, SOLUTION INTRAMUSCULAR; INTRAVENOUS at 15:33

## 2018-12-04 RX ADMIN — SODIUM CHLORIDE, SODIUM LACTATE, POTASSIUM CHLORIDE, AND CALCIUM CHLORIDE: .6; .31; .03; .02 INJECTION, SOLUTION INTRAVENOUS at 16:20

## 2018-12-04 RX ADMIN — FENTANYL CITRATE 50 MCG: 50 INJECTION, SOLUTION INTRAMUSCULAR; INTRAVENOUS at 18:24

## 2018-12-04 RX ADMIN — MIDAZOLAM 2 MG: 1 INJECTION INTRAMUSCULAR; INTRAVENOUS at 14:54

## 2018-12-04 RX ADMIN — OXYCODONE HYDROCHLORIDE 10 MG: 10 TABLET ORAL at 05:07

## 2018-12-04 RX ADMIN — METRONIDAZOLE 500 MG: 500 INJECTION, SOLUTION INTRAVENOUS at 05:07

## 2018-12-04 RX ADMIN — PROPOFOL 200 MG: 10 INJECTION, EMULSION INTRAVENOUS at 15:03

## 2018-12-04 RX ADMIN — Medication 3 MG: at 15:00

## 2018-12-04 NOTE — ANESTHESIA PREPROCEDURE EVALUATION
Review of Systems/Medical History  Patient summary reviewed  Chart reviewed  No history of anesthetic complications     Cardiovascular  Hypertension , CAD (Med mgmnt only- [facility] (80% stenosis LAD, 70% stenosis 70%)) , CAD status: 2VD,   Comment: Pt has PFO--has had multiple TIAs, was told no intervention needed, on Eliquis    ECHO- 10/2018 at [facility]- EF 60%, very limited ECHO,  Pulmonary  Sleep apnea CPAP, No ventilator dependent respiratory failure,        GI/Hepatic      Comment: Heartburn--prn Pepto Bismol     Negative  ROS        Endo/Other  Diabetes type 2 Oral agent,      GYN  Negative gynecology ROS          Hematology  Negative hematology ROS      Musculoskeletal  Negative musculoskeletal ROS        Neurology    TIA (Multiple, last one 6/2018),    Psychology   Anxiety,              Physical Exam    Airway  Comment: 3 FB  Mallampati score: III         Dental   Comment: Minor chips on teeth,     Cardiovascular  Rhythm: regular,     Pulmonary  Breath sounds clear to auscultation,     Other Findings        Anesthesia Plan  ASA Score- 3     Anesthesia Type- general with ASA Monitors  Additional Monitors:   Airway Plan: ETT  Comment: Anesthesia and risks including TIA/CVA explained to pt who understands and accepts    Pt cleared by Cardiology, NOAH Savage for this surgery  Plan Factors-    Induction- intravenous  Postoperative Plan- Plan for postoperative opioid use  Planned trial extubation    Informed Consent- Anesthetic plan and risks discussed with patient  I personally reviewed this patient with the CRNA  Discussed and agreed on the Anesthesia Plan with the SYL Blanca

## 2018-12-04 NOTE — UTILIZATION REVIEW
[address]  Utilization Review Department  Phone: [phone]; Fax [phone]  [email or web address]  ATTENTION: Please call with any questions or concerns to [phone]  and carefully listen to the prompts so that you are directed to the right person  Send all requests for admission clinical reviews, approved or denied determinations and any other requests to fax [phone]   All voicemails are confidential     ==================================================================    Continued Stay Review    Date: 12/04/2018  Age/Sex: 52 y o  male     Assessment/Plan:   41-year-old male with acute cholecystitis     Plan:  -OR today for cholecystectomy  -postoperative check  -analgesia p r n   -DVT prophylaxis postoperatively    Discharge Plan: TBD    Vital Signs: /82 (BP Location: Right arm)   Pulse 61   Temp 98 2 °F (36 8 °C) (Oral)   Resp 18   Ht 6' 2" (1 88 m)   Wt 119 kg (261 lb 11 oz)   SpO2 92%   BMI 33 60 kg/m²     Medications:   Scheduled Meds:   Current Facility-Administered Medications:  albuterol 1 puff Inhalation PRN    amLODIPine 10 mg Oral Daily    atorvastatin 40 mg Oral HS    carvedilol 12 5 mg Oral BID With Meals    cefazolin 1,000 mg Intravenous Q8H Last Rate: 1,000 mg (12/04/18 0507)   cefazolin 1,000 mg Intravenous On Call To OR    glyBURIDE 5 mg Oral Daily With Breakfast    isosorbide mononitrate 60 mg Oral Daily    lisinopril 40 mg Oral Q12H [address]    metroNIDAZOLE 500 mg Intravenous Q8H Last Rate: 500 mg (12/04/18 0507)   metroNIDAZOLE 500 mg Intravenous On Call To OR    mirtazapine 15 mg Oral HS    oxyCODONE 10 mg Oral Q4H PRN   X 32 doses   oxyCODONE 5 mg Oral Q4H PRN    pantoprazole 40 mg Intravenous Q24H [address]    sodium chloride 100 mL/hr Intravenous Continuous Last Rate: 100 mL/hr (12/03/18 1349)   sodium chloride 100 mL/hr Intravenous Continuous Last Rate: 100 mL/hr (12/03/18 1617)   spironolactone 25 mg Oral Daily Continuous Infusions:   sodium chloride 100 mL/hr Last Rate: 100 mL/hr (12/03/18 9473)   sodium chloride 100 mL/hr Last Rate: 100 mL/hr (12/03/18 1617)     Abnormal Labs/Diagnostic Results:

## 2018-12-04 NOTE — OP NOTE
OPERATIVE REPORT  PATIENT NAME: Sony Strickland    :  1965  MRN: [id number]  Pt Location: BE OR ROOM 03    SURGERY DATE: 2018    Surgeon(s) and Role:     * Klarissa Hoffman MD - Primary     * Sukumar Grant MD - Assisting    Preop Diagnosis:  Cholecystitis [K81 9]    Post-Op Diagnosis Codes:     * Cholecystitis [K81 9]    Procedure(s) (LRB):  CHOLECYSTECTOMY LAPAROSCOPIC (N/A)    Specimen(s):  ID Type Source Tests Collected by Time Destination   1 :  Tissue Gallbladder TISSUE EXAM Klarissa Hoffman MD 2018 1712        Estimated Blood Loss:   Minimal    Drains:       Anesthesia Type:   General    Operative Indications:  Cholecystitis [K81 9]      Operative Findings:  Cholelithiasis  Chronic cholecystitis    Complications:   None    Procedure and Technique:  Pt placed supine on table and prepped and draped in usual sterile manner  Timeout performed and all elements of timeout reviewed and confirmed  Laparoscopic equipment was checked and deemed adequate  Marcaine (1/2%) was injected into the skin in the infraumbilical space  A supraumbilical incision was made and dissection was taken down through anterior and posterior abdominal wall layers  Fascia was grasped and an incision was made in the midline at this position  An 11 mm trocar was introduced into the peritoneal space  Abdomen was then insufflated to 15 mm HG pressure and a laparoscopic camera was passed through the trocar, confirming intraperitoneal placement  Using a similar technique, under direct laparoscopic visualization, 3 5 mm trocars were placed in the epigastric area (1) and the right upper quadrant (2)  Laparoscopic instruments were passed through the trocars and the gallbladder was retracted cephalad and laterally  Operative findings were consistent with chronic cholecystitis  Peritoneum covering the gallbladder was dissected bluntly   In the course of dissection, the gallbladder was perforated and a moderate amount of bile exited the gallbladder along with several small gallstones  The bile was irrigated and suctioned from the peritoneal cavity  Dissection continued until what was initially believed to be the cystic duct was identified  In order to confirm the anatomy, an attempt was made to perform an intraoperative cholangiogram  Catheter could not be successfully placed  At this point, graspers were replaced and it appeared as if the gallbladder had indeed been entered in the midportion and the dissection of the cystic duct/artery was too proximal  Graspers were replaced and dissection continued until the cystic duct was skeletonized and the critical view of safety was identified  The cystic duct was doubly clipped and divided  The cystic artery was never fully identified - several smaller vascular structures in the critical view were dissected and divided with electrocautery  The gallbladder was removed form the gallbladder fossa with the laparoscopic hook and bovie cautery  It was placed in an Endocatch bag and removed through the infraumbilical port site without difficulty  The RUQ was irrigated with warm normal saline until the return fluid was clear of blood and bile  The underside of the liver/gallbladder bed was extensively cauterized  A small piece of Surgicel was left in the liver bed  The ports were then removed under direct visualization without difficulty  The subumbilical port site was closed with 2 figure of eight 0-vicryl sutures and the skin sites were closed with running 4-0 monocryl subcuticular suture and steri strips  Pt tolerated the procedure well, was transported to PACU in stable condition and sponge and instrument counts were correct     I was present for the entire procedure       I was present for the entire procedure    Patient Disposition:  PACU     SIGNATURE: Chloe Simpson MD  DATE: December 4, 2018  TIME: 5:36 PM

## 2018-12-04 NOTE — ANESTHESIA POSTPROCEDURE EVALUATION
Post-Op Assessment Note      CV Status:  Stable    Mental Status:  Awake and somnolent    Hydration Status:  Euvolemic    PONV Controlled:  Controlled    Airway Patency:  Patent    Post Op Vitals Reviewed: Yes          Staff: CRNA       Comments: report to RN,  95%  6 L FM, 16, 77, 139/83            BP      Temp      Pulse     Resp      SpO2

## 2018-12-04 NOTE — PROGRESS NOTES
Progress Note - General Surgery   Milan Estrada 52 y o  male MRN: [id number]  Unit/Bed#: MS 46Tom-01 Encounter: [id number]    Assessment:  78-year-old male with acute cholecystitis    Plan:  -OR today for cholecystectomy  -postoperative check  -analgesia p r n   -DVT prophylaxis postoperatively    Subjective/Objective   Chief Complaint:  None    Subjective:  Patient is looking for to surgery today  He is excited Dr Gisele Musa is willing to proceed with the operation  Overnight he slept well, remaining NPO after midnight  He has had no fevers chills nausea or vomiting  Objective:   Blood pressure 140/82, pulse 60, temperature 97 5 °F (36 4 °C), temperature source Oral, resp  rate 18, height 6' 2" (1 88 m), weight 119 kg (261 lb 11 oz), SpO2 93 %  ,Body mass index is 33 6 kg/m²  No intake or output data in the 24 hours ending 12/04/18 0213    Invasive Devices     Peripheral Intravenous Line            Peripheral IV 12/03/18 Left Antecubital less than 1 day                Physical Exam:   General: No acute distress  HEENT: Normocephalic, atraumatic  Neck: Normal ROM   No tracheal deviation  Cardio: Normal rate, regular rhythm  Pulm: Normal respiratory effort  Abdomen:  Obese, non-tender, non-distended  Extremities: MATTHEW, No edema  Neuro: Cranial nerves II-XII intact  Psych: Normal affect      Lab, Imaging and other studies:  CBC:   Lab Results   Component Value Date    WBC 6 18 12/03/2018    HGB 16 7 12/03/2018    HCT 47 8 12/03/2018    MCV 87 12/03/2018     12/03/2018    MCH 30 5 12/03/2018    MCHC 34 9 12/03/2018    RDW 11 9 12/03/2018    MPV 9 2 12/03/2018   , CMP:   Lab Results   Component Value Date    SODIUM 137 12/03/2018    K 4 0 12/03/2018     12/03/2018    CO2 27 12/03/2018    BUN 16 12/03/2018    CREATININE 1 17 12/03/2018    CALCIUM 8 7 12/03/2018    AST 14 12/03/2018    ALT 32 12/03/2018    ALKPHOS 96 12/03/2018    EGFR 70 12/03/2018     VTE Pharmacologic Prophylaxis: Heparin  VTE Mechanical Prophylaxis: sequential compression device

## 2018-12-04 NOTE — UTILIZATION REVIEW
[address]  Utilization Review Department  Phone: [phone]; Fax [phone]  [email or web address]  ATTENTION: Please call with any questions or concerns to [phone]  and carefully listen to the prompts so that you are directed to the right person  Send all requests for admission clinical reviews, approved or denied determinations and any other requests to fax [phone]  All voicemails are confidential     =====================================================================    Initial Clinical Review    Admission: Date/Time/Statement: 12/03/2018 @ 1321  Orders Placed This Encounter   Procedures    Place in Observation     Standing Status:   Standing     Number of Occurrences:   1     Order Specific Question:   Admitting Physician     Answer:   Dallas Chau     Order Specific Question:   Level of Care     Answer:   Med Surg [16]         ED: Date/Time/Mode of Arrival:   ED Arrival Information     Expected Arrival Acuity Means of Arrival Escorted By Service Admission Type    12/3/2018  12/3/2018 12:46 Urgent Walk-In Self Surgery-General Urgent    Arrival Complaint    CHOLECYSTITIS          Chief Complaint:   Chief Complaint   Patient presents with    Vomiting     patient with vomiting at night, a lot of indegestion, pain under the right breast   Private for surgery  History of Illness:   State 4 days ago on Friday night he had the sweats and was vomiting  Occasionally able to keep some fluids in  Continued with pain and intermittent vomiting over the following three days  He states he had a normal BM yesterday, able to void without difficulty  Does feel hungry but has pain        ED Vital Signs:   ED Triage Vitals   Temperature Pulse Respirations Blood Pressure SpO2   12/03/18 1255 12/03/18 1255 12/03/18 1255 12/03/18 1255 12/03/18 1255   (!) 97 4 °F (36 3 °C) 79 18 (!) 195/134 96 %      Temp Source Heart Rate Source Patient Position - Orthostatic VS BP Location FiO2 (%)   12/03/18 1255 12/03/18 1255 12/03/18 1255 12/03/18 1255 --   Oral Monitor Lying Right arm       Pain Score       12/03/18 1258       7        Wt Readings from Last 1 Encounters:   12/03/18 119 kg (261 lb 11 oz)       Abnormal Labs/Diagnostic Test Results:     Ref  Range 12/3/2018 13:34   eGFR Latest Units: ml/min/1 73sq m 70   Sodium Latest Ref Range: 136 - 145 mmol/L 137   Potassium Latest Ref Range: 3 5 - 5 3 mmol/L 4 0   Chloride Latest Ref Range: 100 - 108 mmol/L 104   CO2 Latest Ref Range: 21 - 32 mmol/L 27   Anion Gap Latest Ref Range: 4 - 13 mmol/L 6   BUN Latest Ref Range: 5 - 25 mg/dL 16   Creatinine Latest Ref Range: 0 60 - 1 30 mg/dL 1 17   Glucose, Random Latest Ref Range: 65 - 140 mg/dL 181 (H)   GLUCOSE FASTING Latest Ref Range: 65 - 99 mg/dL 181 (H)   Calcium Latest Ref Range: 8 3 - 10 1 mg/dL 8 7   AST Latest Ref Range: 5 - 45 U/L 14   ALT Latest Ref Range: 12 - 78 U/L 32   Alkaline Phosphatase Latest Ref Range: 46 - 116 U/L 96   Total Protein Latest Ref Range: 6 4 - 8 2 g/dL 7 1   Albumin Latest Ref Range: 3 5 - 5 0 g/dL 3 7   TOTAL BILIRUBIN Latest Ref Range: 0 20 - 1 00 mg/dL 0 44   Lipase Latest Ref Range: 73 - 393 u/L 219   WBC Latest Ref Range: 4 31 - 10 16 Thousand/uL 6 18   Red Blood Cell Count Latest Ref Range: 3 88 - 5 62 Million/uL 5 47   Hemoglobin Latest Ref Range: 12 0 - 17 0 g/dL 16 7   HCT Latest Ref Range: 36 5 - 49 3 % 47 8     11/15 US gallbladder - Cholelithiasis with mild wall thickening  No pericholecystic fluid  Sonographic Jimenez sign cannot be adequately assessed due to patient had received pain medication    2  No sonographic evidence of choledocholithiasis    3  Simple cyst in the midpole the right kidney      ED Treatment:   Medication Administration from 12/03/2018 1236 to 12/03/2018 1418       Date/Time Order Dose Route Action Action by Comments     12/03/2018 1349 sodium chloride 0 9 % infusion 100 mL/hr Intravenous New Bag Elizabeth VERÓNICA Marie      12/03/2018 1350 ceFAZolin (ANCEF) IVPB (premix) 1,000 mg 1,000 mg Intravenous Kranthi Overton RN           Past Medical/Surgical History:    Active Ambulatory Problems     Diagnosis Date Noted    Acute pancreatitis 11/15/2018    Cholecystitis 11/26/2018     Past Medical History:   Diagnosis Date    CAD (coronary artery disease)     Diabetes mellitus ([facility] [address] )     Hypertension        Admitting Diagnosis: Cholecystitis [K81 9]    Age/Sex: 52 y o  male    Assessment/Plan:   Admit to Observation  Ancef and Flagyl  Pain medication  IV fluids for hydration  DVT prophylaxis    Admission Orders:  Scheduled Meds:   Current Facility-Administered Medications:  albuterol 1 puff Inhalation PRN    amLODIPine 10 mg Oral Daily    atorvastatin 40 mg Oral HS    carvedilol 12 5 mg Oral BID With Meals    cefazolin 1,000 mg Intravenous Q8H Last Rate: 1,000 mg (12/04/18 0507)   cefazolin 1,000 mg Intravenous On Call To OR    glyBURIDE 5 mg Oral Daily With Breakfast    isosorbide mononitrate 60 mg Oral Daily    lisinopril 40 mg Oral Q12H [facility]    metroNIDAZOLE 500 mg Intravenous Q8H Last Rate: 500 mg (12/04/18 0507)   metroNIDAZOLE 500 mg Intravenous On Call To OR    mirtazapine 15 mg Oral HS    oxyCODONE 10 mg Oral Q4H PRN   X 3 doses   oxyCODONE 5 mg Oral Q4H PRN    pantoprazole 40 mg Intravenous Q24H [facility]    sodium chloride 100 mL/hr Intravenous Continuous Last Rate: 100 mL/hr (12/03/18 1349)   sodium chloride 100 mL/hr Intravenous Continuous Last Rate: 100 mL/hr (12/03/18 1617)   spironolactone 25 mg Oral Daily      Continuous Infusions:   sodium chloride 100 mL/hr Last Rate: 100 mL/hr (12/03/18 1349)   sodium chloride 100 mL/hr Last Rate: 100 mL/hr (12/03/18 1617)       NPO

## 2018-12-05 LAB
BASOPHILS # BLD AUTO: 0.05 THOUSANDS/ΜL (ref 0–0.1)
BASOPHILS NFR BLD AUTO: 1 % (ref 0–1)
EOSINOPHIL # BLD AUTO: 0.24 THOUSAND/ΜL (ref 0–0.61)
EOSINOPHIL NFR BLD AUTO: 3 % (ref 0–6)
ERYTHROCYTE [DISTWIDTH] IN BLOOD BY AUTOMATED COUNT: 11.9 % (ref 11.6–15.1)
GLUCOSE SERPL-MCNC: 147 MG/DL (ref 65–140)
GLUCOSE SERPL-MCNC: 150 MG/DL (ref 65–140)
GLUCOSE SERPL-MCNC: 158 MG/DL (ref 65–140)
GLUCOSE SERPL-MCNC: 178 MG/DL (ref 65–140)
HCT VFR BLD AUTO: 44.8 % (ref 36.5–49.3)
HGB BLD-MCNC: 15.4 G/DL (ref 12–17)
IMM GRANULOCYTES # BLD AUTO: 0.02 THOUSAND/UL (ref 0–0.2)
IMM GRANULOCYTES NFR BLD AUTO: 0 % (ref 0–2)
LYMPHOCYTES # BLD AUTO: 2.41 THOUSANDS/ΜL (ref 0.6–4.47)
LYMPHOCYTES NFR BLD AUTO: 27 % (ref 14–44)
MCH RBC QN AUTO: 30.9 PG (ref 26.8–34.3)
MCHC RBC AUTO-ENTMCNC: 34.4 G/DL (ref 31.4–37.4)
MCV RBC AUTO: 90 FL (ref 82–98)
MONOCYTES # BLD AUTO: 0.83 THOUSAND/ΜL (ref 0.17–1.22)
MONOCYTES NFR BLD AUTO: 9 % (ref 4–12)
NEUTROPHILS # BLD AUTO: 5.52 THOUSANDS/ΜL (ref 1.85–7.62)
NEUTS SEG NFR BLD AUTO: 60 % (ref 43–75)
NRBC BLD AUTO-RTO: 0 /100 WBCS
PLATELET # BLD AUTO: 265 THOUSANDS/UL (ref 149–390)
PMV BLD AUTO: 9.7 FL (ref 8.9–12.7)
RBC # BLD AUTO: 4.99 MILLION/UL (ref 3.88–5.62)
WBC # BLD AUTO: 9.07 THOUSAND/UL (ref 4.31–10.16)

## 2018-12-05 PROCEDURE — C9113 INJ PANTOPRAZOLE SODIUM, VIA: HCPCS | Performed by: NURSE PRACTITIONER

## 2018-12-05 PROCEDURE — 99024 POSTOP FOLLOW-UP VISIT: CPT | Performed by: NURSE PRACTITIONER

## 2018-12-05 PROCEDURE — 82948 REAGENT STRIP/BLOOD GLUCOSE: CPT

## 2018-12-05 PROCEDURE — 85025 COMPLETE CBC W/AUTO DIFF WBC: CPT | Performed by: SURGERY

## 2018-12-05 RX ORDER — ASPIRIN 81 MG/1
81 TABLET ORAL DAILY
Status: DISCONTINUED | OUTPATIENT
Start: 2018-12-05 | End: 2018-12-06 | Stop reason: HOSPADM

## 2018-12-05 RX ORDER — ACETAMINOPHEN 325 MG/1
975 TABLET ORAL EVERY 8 HOURS SCHEDULED
Status: DISCONTINUED | OUTPATIENT
Start: 2018-12-05 | End: 2018-12-06 | Stop reason: HOSPADM

## 2018-12-05 RX ORDER — METHOCARBAMOL 500 MG/1
750 TABLET, FILM COATED ORAL EVERY 6 HOURS SCHEDULED
Status: DISCONTINUED | OUTPATIENT
Start: 2018-12-05 | End: 2018-12-06 | Stop reason: HOSPADM

## 2018-12-05 RX ADMIN — APIXABAN 5 MG: 5 TABLET, FILM COATED ORAL at 11:06

## 2018-12-05 RX ADMIN — SPIRONOLACTONE 25 MG: 25 TABLET ORAL at 08:36

## 2018-12-05 RX ADMIN — METHOCARBAMOL TABLETS 750 MG: 750 TABLET, COATED ORAL at 16:39

## 2018-12-05 RX ADMIN — ACETAMINOPHEN 975 MG: 325 TABLET ORAL at 21:20

## 2018-12-05 RX ADMIN — CARVEDILOL 12.5 MG: 12.5 TABLET, FILM COATED ORAL at 08:37

## 2018-12-05 RX ADMIN — OXYCODONE HYDROCHLORIDE 10 MG: 10 TABLET ORAL at 02:02

## 2018-12-05 RX ADMIN — HYDROMORPHONE HYDROCHLORIDE 1 MG: 1 INJECTION, SOLUTION INTRAMUSCULAR; INTRAVENOUS; SUBCUTANEOUS at 00:17

## 2018-12-05 RX ADMIN — ISOSORBIDE MONONITRATE 60 MG: 60 TABLET, EXTENDED RELEASE ORAL at 08:37

## 2018-12-05 RX ADMIN — APIXABAN 5 MG: 5 TABLET, FILM COATED ORAL at 17:52

## 2018-12-05 RX ADMIN — OXYCODONE HYDROCHLORIDE 10 MG: 10 TABLET ORAL at 15:42

## 2018-12-05 RX ADMIN — HYDROMORPHONE HYDROCHLORIDE 1 MG: 1 INJECTION, SOLUTION INTRAMUSCULAR; INTRAVENOUS; SUBCUTANEOUS at 12:29

## 2018-12-05 RX ADMIN — INSULIN LISPRO 2 UNITS: 100 INJECTION, SOLUTION INTRAVENOUS; SUBCUTANEOUS at 17:53

## 2018-12-05 RX ADMIN — ACETAMINOPHEN 975 MG: 325 TABLET ORAL at 16:38

## 2018-12-05 RX ADMIN — PANTOPRAZOLE SODIUM 40 MG: 40 INJECTION, POWDER, FOR SOLUTION INTRAVENOUS at 08:36

## 2018-12-05 RX ADMIN — MIRTAZAPINE 15 MG: 15 TABLET ORAL at 21:19

## 2018-12-05 RX ADMIN — INSULIN LISPRO 2 UNITS: 100 INJECTION, SOLUTION INTRAVENOUS; SUBCUTANEOUS at 07:49

## 2018-12-05 RX ADMIN — HYDROMORPHONE HYDROCHLORIDE 1 MG: 1 INJECTION, SOLUTION INTRAMUSCULAR; INTRAVENOUS; SUBCUTANEOUS at 05:13

## 2018-12-05 RX ADMIN — LISINOPRIL 40 MG: 20 TABLET ORAL at 08:37

## 2018-12-05 RX ADMIN — OXYCODONE HYDROCHLORIDE 10 MG: 10 TABLET ORAL at 20:56

## 2018-12-05 RX ADMIN — ASPIRIN 81 MG: 81 TABLET, COATED ORAL at 11:06

## 2018-12-05 RX ADMIN — OXYCODONE HYDROCHLORIDE 10 MG: 10 TABLET ORAL at 11:04

## 2018-12-05 RX ADMIN — AMLODIPINE BESYLATE 10 MG: 10 TABLET ORAL at 08:36

## 2018-12-05 RX ADMIN — HEPARIN SODIUM 5000 UNITS: 5000 INJECTION INTRAVENOUS; SUBCUTANEOUS at 05:13

## 2018-12-05 RX ADMIN — ATORVASTATIN CALCIUM 40 MG: 40 TABLET, FILM COATED ORAL at 21:20

## 2018-12-05 RX ADMIN — OXYCODONE HYDROCHLORIDE 10 MG: 10 TABLET ORAL at 06:23

## 2018-12-05 RX ADMIN — SODIUM CHLORIDE, SODIUM LACTATE, POTASSIUM CHLORIDE, AND CALCIUM CHLORIDE 50 ML/HR: .6; .31; .03; .02 INJECTION, SOLUTION INTRAVENOUS at 00:21

## 2018-12-05 RX ADMIN — HYDROMORPHONE HYDROCHLORIDE 1 MG: 1 INJECTION, SOLUTION INTRAMUSCULAR; INTRAVENOUS; SUBCUTANEOUS at 08:36

## 2018-12-05 RX ADMIN — CARVEDILOL 12.5 MG: 12.5 TABLET, FILM COATED ORAL at 16:42

## 2018-12-05 RX ADMIN — LISINOPRIL 40 MG: 20 TABLET ORAL at 20:55

## 2018-12-05 NOTE — PROGRESS NOTES
Progress Note - General Surgery   Jomar Peterson 52 y o  male MRN: [id number]  Unit/Bed#: -01 Encounter: [id number]    Assessment:  69-year-old male with acute cholecystitis now status post cholecystectomy    Plan:  -analgesia p r n   -diet as tolerated  -DVT prophylaxis    Subjective/Objective   Chief Complaint:  Pain    Subjective:  Patient grateful for surgical intervention  Feels okay this morning, got a little bit of sleep last night  Has no nausea and vomiting  Denies flatus and bowel movement  Objective:   Blood pressure 142/83, pulse 80, temperature 98 2 °F (36 8 °C), temperature source Oral, resp  rate 18, height 6' 2" (1 88 m), weight 119 kg (261 lb 11 oz), SpO2 92 %  ,Body mass index is 33 6 kg/m²  Intake/Output Summary (Last 24 hours) at 12/05/18 0549  Last data filed at 12/04/18 2300   Gross per 24 hour   Intake          4331 67 ml   Output              300 ml   Net          4031 67 ml       Invasive Devices     Peripheral Intravenous Line            Peripheral IV 12/03/18 Left Antecubital 1 day    Peripheral IV 12/04/18 Right Hand less than 1 day                Physical Exam:   General: No acute distress  HEENT: Normocephalic, atraumatic  Neck: Normal ROM  No tracheal deviation  Cardio: Normal rate, regular rhythm  Pulm: Normal respiratory effort  Abdomen: Soft, non-tender, non-distended  Umbilical camera site with minimal drainage    Extremities: MATTHEW, No edema  Neuro: Cranial nerves II-XII intact  Psych: Normal affect      Lab, Imaging and other studies:CBC: No results found for: WBC, HGB, HCT, MCV, PLT, ADJUSTEDWBC, MCH, MCHC, RDW, MPV, NRBC, CMP: No results found for: SODIUM, K, CL, CO2, ANIONGAP, BUN, CREATININE, GLUCOSE, CALCIUM, AST, ALT, ALKPHOS, PROT, BILITOT, EGFR  VTE Pharmacologic Prophylaxis: Heparin  VTE Mechanical Prophylaxis: sequential compression device

## 2018-12-05 NOTE — PLAN OF CARE
Problem: DISCHARGE PLANNING - CARE MANAGEMENT  Goal: Discharge to post-acute care or home with appropriate resources  INTERVENTIONS:  - Conduct assessment to determine patient/family and health care team treatment goals, and need for post-acute services based on payer coverage, community resources, and patient preferences, and barriers to discharge  - Address psychosocial, clinical, and financial barriers to discharge as identified in assessment in conjunction with the patient/family and health care team  - Arrange appropriate level of post-acute services according to patient's   needs and preference and payer coverage in collaboration with the physician and health care team  - Communicate with and update the patient/family, physician, and health care team regarding progress on the discharge plan  - Arrange appropriate transportation to post-acute venues   -Pt to d/c with appropriate resources when medically ready   Outcome: Progressing

## 2018-12-05 NOTE — QUICK NOTE
Nurse-Patient-Provider rounds were completed with the patient's nurses today, [address] and [place]  We discussed the plan is to continue diet as tolerated, and maintain a saline locked IV  Anticoagulation resumed today; plan to follow up CBC for assessing stability of H&H on 12/06/2018  Analgesic regimen to be adjusted for improved pain control this afternoon  Anticipate discharge on 12/06/2018  We reviewed all of the invasive devices/lines/telemetry orders   - None  Pain Assessment / Plan:  - Continue multimodal analgesic regimen  Mobility Assessment / Plan:  - Activity as tolerated  Goals / Barriers for discharge:  - Anticipate discharge on 12/06/2018  - Case management following; case and discharge needs discussed  All questions and concerns were addressed  I spent greater than 15 minutes reviewing the plan with the patient and the nurse, and coordinating care for the day      Arminda Rodriguez PA-C  12/5/2018 4:14 PM

## 2018-12-05 NOTE — SOCIAL WORK
CM met with pt at bedside and explained CM role  Pt lives with his sister in a 1 story home with 5 steps to enter  PTA pt was independent with ADL's and did not require the use of DME for ambulation  Pt uses a CPAP machine  Pt is able to drive and uses [employer] in [facility]  Pt reports no hx of VNA/STR  Pt reports no hx of Drug/alcohol rehabilitation or mental health issues  Pt's sister is his POA and emergency contact [phone]  CM reviewed d/c planning process including the following: identifying help at home, patient preference for d/c planning needs, Discharge Lounge, [facility] Meds to Bed program, availability of treatment team to discuss questions or concerns patient and/or family may have regarding understanding medications and recognizing signs and symptoms once discharged  CM also encouraged patient to follow up with all recommended appointments after discharge  Patient advised of importance for patient and family to participate in managing patients medical well being

## 2018-12-05 NOTE — UTILIZATION REVIEW
[facility]  Utilization Review Department  Phone: [phone]; Fax [phone]  [email or web address]  ATTENTION: Please call with any questions or concerns to [phone]  and carefully listen to the prompts so that you are directed to the right person  Send all requests for admission clinical reviews, approved or denied determinations and any other requests to fax [phone]  All voicemails are confidential     =========================================================================    Continued Stay Review    SURGERY DATE: 12/4/2018  Procedure(s) (LRB):  CHOLECYSTECTOMY LAPAROSCOPIC (N/A)  Anesthesia Type: General  Operative Findings:  Cholelithiasis  Chronic cholecystitis      Date: 12/05/2018  Age/Sex: 52 y o  male     Assessment/Plan:   Assessment:  47-year-old male with acute cholecystitis now status post cholecystectomy     Plan:  -analgesia p r n   -diet as tolerated  -DVT prophylaxis  Tolerating regular diet  Moderate post-operative pain  Restart Eliquis today - follow-up H&H 12/6    Discharge Plan: TBD    Vital Signs: /95 (BP Location: Left arm)   Pulse 98   Temp 99 °F (37 2 °C) (Oral)   Resp 18   Ht 6' 2" (1 88 m)   Wt 119 kg (261 lb 11 oz)   SpO2 90%   BMI 33 60 kg/m²     Medications:   Scheduled Meds:   Current Facility-Administered Medications:  albuterol 1 puff Inhalation PRN   amLODIPine 10 mg Oral Daily   apixaban 5 mg Oral BID   aspirin 81 mg Oral Daily   atorvastatin 40 mg Oral HS   carvedilol 12 5 mg Oral BID With Meals   HYDROmorphone 1 mg Intravenous Q3H PRN  X 3 doses   insulin lispro 2-12 Units Subcutaneous TID AC   isosorbide mononitrate 60 mg Oral Daily   lisinopril 40 mg Oral Q12H YOLIE   mirtazapine 15 mg Oral HS   oxyCODONE 10 mg Oral Q4H PRN   X 3 doses   oxyCODONE 5 mg Oral Q4H PRN   pantoprazole 40 mg Intravenous Q24H YOLIE   spironolactone 25 mg Oral Daily     Abnormal Labs/Diagnostic Results:    WBC 9 07 4 31 - 10 16 Thousand/uL     RBC 4 99 3 88 - 5 62 Million/uL     Hemoglobin 15 4 12 0 - 17 0 g/dL     Hematocrit 44 8 36 5 - 49 3 %

## 2018-12-06 VITALS
BODY MASS INDEX: 33.58 KG/M2 | HEART RATE: 78 BPM | DIASTOLIC BLOOD PRESSURE: 104 MMHG | RESPIRATION RATE: 18 BRPM | HEIGHT: 74 IN | SYSTOLIC BLOOD PRESSURE: 166 MMHG | WEIGHT: 261.69 LBS | OXYGEN SATURATION: 98 % | TEMPERATURE: 98.2 F

## 2018-12-06 LAB
ALBUMIN SERPL BCP-MCNC: 2.7 G/DL (ref 3.5–5)
ALP SERPL-CCNC: 83 U/L (ref 46–116)
ALT SERPL W P-5'-P-CCNC: 49 U/L (ref 12–78)
ANION GAP SERPL CALCULATED.3IONS-SCNC: 7 MMOL/L (ref 4–13)
AST SERPL W P-5'-P-CCNC: 39 U/L (ref 5–45)
BILIRUB SERPL-MCNC: 0.41 MG/DL (ref 0.2–1)
BUN SERPL-MCNC: 11 MG/DL (ref 5–25)
CALCIUM SERPL-MCNC: 9 MG/DL (ref 8.3–10.1)
CHLORIDE SERPL-SCNC: 103 MMOL/L (ref 100–108)
CO2 SERPL-SCNC: 25 MMOL/L (ref 21–32)
CREAT SERPL-MCNC: 1.28 MG/DL (ref 0.6–1.3)
GFR SERPL CREATININE-BSD FRML MDRD: 63 ML/MIN/1.73SQ M
GLUCOSE SERPL-MCNC: 143 MG/DL (ref 65–140)
GLUCOSE SERPL-MCNC: 143 MG/DL (ref 65–140)
GLUCOSE SERPL-MCNC: 172 MG/DL (ref 65–140)
HCT VFR BLD AUTO: 42.4 % (ref 36.5–49.3)
HGB BLD-MCNC: 14.4 G/DL (ref 12–17)
POTASSIUM SERPL-SCNC: 3.4 MMOL/L (ref 3.5–5.3)
PROT SERPL-MCNC: 6.1 G/DL (ref 6.4–8.2)
SODIUM SERPL-SCNC: 135 MMOL/L (ref 136–145)

## 2018-12-06 PROCEDURE — 85014 HEMATOCRIT: CPT | Performed by: SURGERY

## 2018-12-06 PROCEDURE — 82948 REAGENT STRIP/BLOOD GLUCOSE: CPT

## 2018-12-06 PROCEDURE — 85018 HEMOGLOBIN: CPT | Performed by: SURGERY

## 2018-12-06 PROCEDURE — C9113 INJ PANTOPRAZOLE SODIUM, VIA: HCPCS | Performed by: NURSE PRACTITIONER

## 2018-12-06 PROCEDURE — 80053 COMPREHEN METABOLIC PANEL: CPT | Performed by: SURGERY

## 2018-12-06 PROCEDURE — 99024 POSTOP FOLLOW-UP VISIT: CPT | Performed by: PHYSICIAN ASSISTANT

## 2018-12-06 RX ORDER — ACETAMINOPHEN 325 MG/1
975 TABLET ORAL EVERY 8 HOURS PRN
Qty: 30 TABLET | Refills: 0 | Status: SHIPPED | OUTPATIENT
Start: 2018-12-06 | End: 2021-12-27 | Stop reason: CLARIF

## 2018-12-06 RX ORDER — METHOCARBAMOL 750 MG/1
750 TABLET, FILM COATED ORAL EVERY 6 HOURS PRN
Qty: 20 TABLET | Refills: 0 | Status: SHIPPED | OUTPATIENT
Start: 2018-12-06 | End: 2018-12-12

## 2018-12-06 RX ORDER — OXYCODONE HYDROCHLORIDE 5 MG/1
5-10 TABLET ORAL EVERY 4 HOURS PRN
Qty: 36 TABLET | Refills: 0 | Status: SHIPPED | OUTPATIENT
Start: 2018-12-06 | End: 2018-12-12

## 2018-12-06 RX ADMIN — ACETAMINOPHEN 975 MG: 325 TABLET ORAL at 05:17

## 2018-12-06 RX ADMIN — ISOSORBIDE MONONITRATE 60 MG: 60 TABLET, EXTENDED RELEASE ORAL at 08:13

## 2018-12-06 RX ADMIN — METHOCARBAMOL TABLETS 750 MG: 750 TABLET, COATED ORAL at 05:17

## 2018-12-06 RX ADMIN — HYDROMORPHONE HYDROCHLORIDE 1 MG: 1 INJECTION, SOLUTION INTRAMUSCULAR; INTRAVENOUS; SUBCUTANEOUS at 07:49

## 2018-12-06 RX ADMIN — METHOCARBAMOL TABLETS 750 MG: 750 TABLET, COATED ORAL at 00:38

## 2018-12-06 RX ADMIN — APIXABAN 5 MG: 5 TABLET, FILM COATED ORAL at 08:12

## 2018-12-06 RX ADMIN — CARVEDILOL 12.5 MG: 12.5 TABLET, FILM COATED ORAL at 08:12

## 2018-12-06 RX ADMIN — SPIRONOLACTONE 25 MG: 25 TABLET ORAL at 08:13

## 2018-12-06 RX ADMIN — LISINOPRIL 40 MG: 20 TABLET ORAL at 08:12

## 2018-12-06 RX ADMIN — OXYCODONE HYDROCHLORIDE 5 MG: 5 TABLET ORAL at 08:23

## 2018-12-06 RX ADMIN — PANTOPRAZOLE SODIUM 40 MG: 40 INJECTION, POWDER, FOR SOLUTION INTRAVENOUS at 08:13

## 2018-12-06 RX ADMIN — HYDROMORPHONE HYDROCHLORIDE 1 MG: 1 INJECTION, SOLUTION INTRAMUSCULAR; INTRAVENOUS; SUBCUTANEOUS at 00:38

## 2018-12-06 RX ADMIN — AMLODIPINE BESYLATE 10 MG: 10 TABLET ORAL at 08:12

## 2018-12-06 RX ADMIN — OXYCODONE HYDROCHLORIDE 10 MG: 10 TABLET ORAL at 05:17

## 2018-12-06 RX ADMIN — ASPIRIN 81 MG: 81 TABLET, COATED ORAL at 08:12

## 2018-12-06 NOTE — UTILIZATION REVIEW
[address]  Utilization Review Department  Phone: [phone]; Fax [phone]  [email or web address]  ATTENTION: Please call with any questions or concerns to [phone]  and carefully listen to the prompts so that you are directed to the right person  Send all requests for admission clinical reviews, approved or denied determinations and any other requests to fax [phone]   All voicemails are confidential     ==========================================================================    Continued Stay Review    Date: 12/06/2018  Age/Sex: 52 y o  male     Assessment/Plan:     Discharge Plan:   12/06/18 0829  Discharge patient Once     Discharge Disposition: Home/Self Care    Expected Discharge Time: Morning    Expected Discharge Date: 12/06/18            Vital Signs: BP (!) 166/104 (BP Location: Left arm)   Pulse 78   Temp 98 2 °F (36 8 °C) (Oral)   Resp 18   Ht 6' 2" (1 88 m)   Wt 119 kg (261 lb 11 oz)   SpO2 98%   BMI 33 60 kg/m²     Medications:   Scheduled Meds:   Current Facility-Administered Medications:  acetaminophen 975 mg Oral Q8H [facility] Rivas Roblero PA-C   albuterol 1 puff Inhalation PRN BONILLA Pro   amLODIPine 10 mg Oral Daily BONILLA Darling   apixaban 5 mg Oral BID Count includes the [facility]   aspirin 81 mg Oral Daily Freya Bhatt   atorvastatin 40 mg Oral HS BONILLA Darling   carvedilol 12 5 mg Oral BID With Meals BONILLA Pro   HYDROmorphone 1 mg Intravenous Q3H PRN Efrain Bhatt   insulin lispro 2-12 Units Subcutaneous TID AC Freya Bhatt   isosorbide mononitrate 60 mg Oral Daily BONILLA Pro   lisinopril 40 mg Oral Q12H [facility] BONILLA Darling   methocarbamol 750 mg Oral Q6H [facility] Rivas Roblero PA-C   mirtazapine 15 mg Oral HS BONILLA Pro   oxyCODONE 10 mg Oral Q4H PRN Eran Pollard, BONILLA   oxyCODONE 5 mg Oral Q4H PRN Eran Pollard, BONILLA   pantoprazole 40 mg Intravenous Q24H [facility] BONILLA Millan   spironolactone 25 mg Oral Daily BONILLA Billings     Abnormal Labs/Diagnostic Results:

## 2018-12-06 NOTE — DISCHARGE INSTRUCTIONS
Acute Care Surgery Discharge Instructions    Please follow-up as instructed  If you do not already have a follow-up appointment, please call the office when you leave to schedule an appointment to be seen in 2-3 weeks for post-operative re-evaluation  Activity:  - No lifting greater than 20 pounds or strenuous physical activity or exercise for at least 2 weeks  - Walking and normal light activities are encouraged  - Normal daily activities including climbing steps are okay  - No driving until no longer using pain medications  Return to work:    - You may return to work in 2 weeks or sooner if you are feeling well enough  Diet:    - You may resume your normal diet  Wound Care:  - May shower daily  No tub baths or swimming until cleared by your surgeon   - Wash incision gently with soap and water and pat dry  - Do not apply any creams or ointments unless instructed to do so by your surgeon   - Viola Oakes may apply ice as needed (no longer than 20 minutes an hour) for the first 48 hours  - Bruising is not unusual and will go away with a little time  You may apply a warm, moist compress that may help the bruising resolve quicker  Medications:    - You may resume all of your regular medications, including blood thinners and aspirin, after going home unless otherwise instructed  Please refer to your discharge medication list for further details  - Please take the pain medications as directed  - You are encouraged to use non-narcotic pain medications first and whenever possible  Reserve the use of narcotic pain medication for moderate to severe pain not controlled by non-narcotic medications   - No driving while taking narcotic pain medications  - You may become constipated, especially if taking pain medications  You may take any over the counter stool softeners or laxatives as needed  Examples: Milk of Magnesia, Colace, Senna      Additional Instructions:  - If you have any questions or concerns after discharge please call the office   - Call office or return to ER if fever greater than 101, chills, persistent nausea/vomiting, worsening/uncontrollable pain, and/or increasing redness or purulent/foul smelling drainage from incision(s)

## 2018-12-06 NOTE — DISCHARGE SUMMARY
Discharge- Cesar Dill 1965, 52 y o  male MRN: [id number]    Unit/Bed#: -01 Encounter: [id number]    Primary Care Provider: Colbert Libman, DO   Date and time admitted to hospital: 12/3/2018 12:58 PM        * Cholecystitis   Assessment & Plan    - Acute on chronic cholecystitis status post laparoscopic cholecystectomy on 12/04/2018  - Diet as tolerated  - Continue multimodal analgesic regimen  - Home anticoagulation with Eliquis and home anti-platelet with aspirin regimen restarted on 12/05/2018  Hemoglobin remained stable on recheck 12/06/2018  - Activity as tolerated with postoperative restrictions   - Stable for discharge on 12/06/2018 with outpatient surgical follow-up in 2 weeks  - Incidentally noted right apical pulmonary nodule on admission CT scan should be followed up on by his primary care provider  Per radiology recommendations, patient should have a repeat noncontrast CT scan of the chest in 12 months  Nurse-patient-provider rounds were completed on the day of discharge with the patient's nurse, Antonio Stevens  Discharge Summary - General Surgery   Cesar Dill 52 y o  male MRN: [id number]  Unit/Bed#: -01 Encounter: [id number]    Admission Date: 12/3/2018     Discharge Date: 12/6/2018    Admitting Diagnosis: Cholecystitis [K81 9]    Discharge Diagnosis: See above  Attending and Service: [facility]  Consulting Physician(s): None  Imaging and Procedures Performed:     No results found  Laparoscopic cholecystectomy on 12/04/2018  Pathology: Final surgical pathology pending at the time of discharge  Hospital Course: Cesar Dill is a 70-year-old male who presented with right upper abdominal pain  He initially was evaluated in the outpatient office before being referred to the emergency department for further evaluation  He states that he had sweats and nausea with vomiting for several days in addition to the pain    He denied any chest pain or shortness of breath  On his initial surgical evaluation, he was hypertensive with a blood pressure in the 190s over 130s, but was afebrile with normal vital signs otherwise; his abdomen was soft and nondistended, but tender without evidence of peritonitis; the remainder of his exam was unremarkable  His recent workup including an abdominal ultrasound as well as a CT scan of the chest abdomen pelvis with Pulmonary Embolism protocol from mid November was reviewed  He was admitted to the acute care surgery service with cholelithiasis and concern for possible cholecystitis  He also had a recent admission for gallstone pancreatitis  In light of his recent history and recurrent symptoms with known cholelithiasis, operative intervention was recommended to him  He agreed to proceed with operative regimen, and underwent a laparoscopic cholecystectomy on 12/04/2018  Postoperatively, his anticoagulation was resumed and he maintained a stable hemoglobin without evidence of postoperative bleeding  He was able to tolerate a regular diet  Once tolerating a diet, his IV fluids were discontinued, and he was transitioned to an oral analgesic regimen  He is deemed stable for discharge on 12/06/2018  On discharge, the patient is instructed to follow-up with the patient's primary care provider within the next 2 weeks to review the events of the recent hospitalization  The patient is instructed to follow-up with the [facility] as scheduled on 12/17/2018 at 9:00 AM  The patient is instructed to follow the provided discharge instructions  Condition at Discharge: good     Discharge instructions/Information to patient and family:   See after visit summary for information provided to patient and family  Provisions for Follow-Up Care:  See after visit summary for information related to follow-up care and any pertinent home health orders        Disposition: See After Visit Summary for discharge disposition information  Planned Readmission: No    Discharge Statement   I spent 25 minutes discharging the patient  This time was spent on the day of discharge  I had direct contact with the patient on the day of discharge  Additional documentation is required if more than 30 minutes were spent on discharge  Discharge Medications:  See after visit summary for reconciled discharge medications provided to patient and family  I performed a search on the [address] website on this patient for past prescriptions of controlled substances      Jairo Allan PA-C  12/6/2018  8:35 AM

## 2018-12-06 NOTE — ASSESSMENT & PLAN NOTE
- Acute on chronic cholecystitis status post laparoscopic cholecystectomy on 12/04/2018  - Diet as tolerated  - Continue multimodal analgesic regimen  - Home anticoagulation with Eliquis and home anti-platelet with aspirin regimen restarted on 12/05/2018  Hemoglobin remained stable on recheck 12/06/2018  - Activity as tolerated with postoperative restrictions   - Stable for discharge on 12/06/2018 with outpatient surgical follow-up in 2 weeks  - Incidentally noted right apical pulmonary nodule on admission CT scan should be followed up on by his primary care provider  Per radiology recommendations, patient should have a repeat noncontrast CT scan of the chest in 12 months

## 2018-12-06 NOTE — INCIDENTAL FINDINGS
The following findings require follow up:  Radiographic finding   Findin to 5 mm nodule in the right apex (axial image 36, series 2)  Based on current Fleischner Society 2017 Guidelines on incidental pulmonary nodule, 12 month follow-up non-contrast chest CT is recommended  Follow up required:  Outpatient follow-up with primary care provider and repeat noncontrast CT scan of the chest in 12 months  Follow up should be done within 1 month(s)    Please notify the following clinician to assist with the follow up:   Dr Laura Van

## 2018-12-06 NOTE — PROGRESS NOTES
Progress Note - General Surgery   Sony Strickland 52 y o  male MRN: [id number]  Unit/Bed#: -01 Encounter: [id number]    Assessment/Plan:  52 y o  male with acute cholecystitis     * Cholecystitis   Assessment & Plan    S/p lap jhon 12/4  Home eliquis restarted  Diet as tolerated  dispo planning         Subjective/Objective     Subjective: tolerating diet  Passing gas, no BM  Reports abdominal pain, controlled with PO medication  Objective:     Vitals: Blood pressure 158/95, pulse 73, temperature 98 4 °F (36 9 °C), temperature source Oral, resp  rate 18, height 6' 2" (1 88 m), weight 119 kg (261 lb 11 oz), SpO2 93 %  ,Body mass index is 33 6 kg/m²  I/O       12/04 0701 - 12/05 0700 12/05 0701 - 12/06 0700    P  O  0 300    I V  (mL/kg) 4331 7 (36 4) 613 3 (5 2)    Total Intake(mL/kg) 4331 7 (36 4) 913 3 (7 7)    Urine (mL/kg/hr) 300 (0 1)     Total Output 300      Net +4031 7 +913 3          Unmeasured Urine Occurrence 2 x 3 x          Physical Exam:  GEN: NAD  HEENT: MMM  CV: RRR  Lung: Normal effort  Ab: Soft, NT/ND, incisions C/D/I  Extrem: No CCE  Neuro:  A+Ox3    Lab, Imaging and other studies:   CBC with diff:   Lab Results   Component Value Date    WBC 9 07 12/05/2018    HGB 15 4 12/05/2018    HCT 44 8 12/05/2018    MCV 90 12/05/2018     12/05/2018    MCH 30 9 12/05/2018    MCHC 34 4 12/05/2018    RDW 11 9 12/05/2018    MPV 9 7 12/05/2018    NRBC 0 12/05/2018   , BMP/CMP: No results found for: SODIUM, K, CL, CO2, ANIONGAP, BUN, CREATININE, GLUCOSE, CALCIUM, AST, ALT, ALKPHOS, PROT, BILITOT, EGFR, Magnesium: No components found for: MAG  VTE Pharmacologic Prophylaxis: Eliquis  VTE Mechanical Prophylaxis: sequential compression device

## 2018-12-12 ENCOUNTER — APPOINTMENT (EMERGENCY)
Dept: RADIOLOGY | Facility: HOSPITAL | Age: 53
End: 2018-12-12
Payer: COMMERCIAL

## 2018-12-12 ENCOUNTER — HOSPITAL ENCOUNTER (EMERGENCY)
Facility: HOSPITAL | Age: 53
Discharge: HOME | End: 2018-12-12
Attending: EMERGENCY MEDICINE
Payer: COMMERCIAL

## 2018-12-12 VITALS
SYSTOLIC BLOOD PRESSURE: 131 MMHG | WEIGHT: 260 LBS | DIASTOLIC BLOOD PRESSURE: 77 MMHG | HEART RATE: 64 BPM | BODY MASS INDEX: 33.37 KG/M2 | HEIGHT: 74 IN | OXYGEN SATURATION: 92 % | RESPIRATION RATE: 19 BRPM | TEMPERATURE: 97.4 F

## 2018-12-12 DIAGNOSIS — G89.18 POST-OPERATIVE PAIN: ICD-10-CM

## 2018-12-12 DIAGNOSIS — K81.9 CHOLECYSTITIS: ICD-10-CM

## 2018-12-12 DIAGNOSIS — R10.9 ABDOMINAL PAIN: Primary | ICD-10-CM

## 2018-12-12 LAB
ALBUMIN SERPL BCP-MCNC: 3.6 G/DL (ref 3.5–5)
ALP SERPL-CCNC: 109 U/L (ref 46–116)
ALT SERPL W P-5'-P-CCNC: 28 U/L (ref 12–78)
ANION GAP SERPL CALCULATED.3IONS-SCNC: 7 MMOL/L (ref 4–13)
AST SERPL W P-5'-P-CCNC: 16 U/L (ref 5–45)
BASOPHILS # BLD AUTO: 0.04 THOUSANDS/ΜL (ref 0–0.1)
BASOPHILS NFR BLD AUTO: 1 % (ref 0–1)
BILIRUB SERPL-MCNC: 0.8 MG/DL (ref 0.2–1)
BUN SERPL-MCNC: 13 MG/DL (ref 5–25)
CALCIUM SERPL-MCNC: 9.2 MG/DL (ref 8.3–10.1)
CHLORIDE SERPL-SCNC: 99 MMOL/L (ref 100–108)
CO2 SERPL-SCNC: 27 MMOL/L (ref 21–32)
CREAT SERPL-MCNC: 1.26 MG/DL (ref 0.6–1.3)
EOSINOPHIL # BLD AUTO: 0.17 THOUSAND/ΜL (ref 0–0.61)
EOSINOPHIL NFR BLD AUTO: 2 % (ref 0–6)
ERYTHROCYTE [DISTWIDTH] IN BLOOD BY AUTOMATED COUNT: 11.6 % (ref 11.6–15.1)
GFR SERPL CREATININE-BSD FRML MDRD: 64 ML/MIN/1.73SQ M
GLUCOSE SERPL-MCNC: 207 MG/DL (ref 65–140)
HCT VFR BLD AUTO: 44.6 % (ref 36.5–49.3)
HGB BLD-MCNC: 15.4 G/DL (ref 12–17)
IMM GRANULOCYTES # BLD AUTO: 0.02 THOUSAND/UL (ref 0–0.2)
IMM GRANULOCYTES NFR BLD AUTO: 0 % (ref 0–2)
LIPASE SERPL-CCNC: 116 U/L (ref 73–393)
LYMPHOCYTES # BLD AUTO: 1.83 THOUSANDS/ΜL (ref 0.6–4.47)
LYMPHOCYTES NFR BLD AUTO: 24 % (ref 14–44)
MCH RBC QN AUTO: 30 PG (ref 26.8–34.3)
MCHC RBC AUTO-ENTMCNC: 34.5 G/DL (ref 31.4–37.4)
MCV RBC AUTO: 87 FL (ref 82–98)
MONOCYTES # BLD AUTO: 0.4 THOUSAND/ΜL (ref 0.17–1.22)
MONOCYTES NFR BLD AUTO: 5 % (ref 4–12)
NEUTROPHILS # BLD AUTO: 5.32 THOUSANDS/ΜL (ref 1.85–7.62)
NEUTS SEG NFR BLD AUTO: 68 % (ref 43–75)
NRBC BLD AUTO-RTO: 0 /100 WBCS
PLATELET # BLD AUTO: 316 THOUSANDS/UL (ref 149–390)
PMV BLD AUTO: 9.3 FL (ref 8.9–12.7)
POTASSIUM SERPL-SCNC: 5 MMOL/L (ref 3.5–5.3)
PROT SERPL-MCNC: 7.6 G/DL (ref 6.4–8.2)
RBC # BLD AUTO: 5.13 MILLION/UL (ref 3.88–5.62)
SODIUM SERPL-SCNC: 133 MMOL/L (ref 136–145)
WBC # BLD AUTO: 7.78 THOUSAND/UL (ref 4.31–10.16)

## 2018-12-12 PROCEDURE — 93005 ELECTROCARDIOGRAM TRACING: CPT

## 2018-12-12 PROCEDURE — 85025 COMPLETE CBC W/AUTO DIFF WBC: CPT | Performed by: EMERGENCY MEDICINE

## 2018-12-12 PROCEDURE — 36415 COLL VENOUS BLD VENIPUNCTURE: CPT

## 2018-12-12 PROCEDURE — 99285 EMERGENCY DEPT VISIT HI MDM: CPT

## 2018-12-12 PROCEDURE — 99024 POSTOP FOLLOW-UP VISIT: CPT | Performed by: SURGERY

## 2018-12-12 PROCEDURE — 80053 COMPREHEN METABOLIC PANEL: CPT | Performed by: EMERGENCY MEDICINE

## 2018-12-12 PROCEDURE — 74177 CT ABD & PELVIS W/CONTRAST: CPT

## 2018-12-12 PROCEDURE — 83690 ASSAY OF LIPASE: CPT | Performed by: EMERGENCY MEDICINE

## 2018-12-12 PROCEDURE — 96374 THER/PROPH/DIAG INJ IV PUSH: CPT

## 2018-12-12 PROCEDURE — 96375 TX/PRO/DX INJ NEW DRUG ADDON: CPT

## 2018-12-12 RX ORDER — HYDROMORPHONE HCL/PF 1 MG/ML
1 SYRINGE (ML) INJECTION ONCE
Status: COMPLETED | OUTPATIENT
Start: 2018-12-12 | End: 2018-12-12

## 2018-12-12 RX ORDER — OXYCODONE HYDROCHLORIDE 5 MG/1
5-10 TABLET ORAL EVERY 4 HOURS PRN
Qty: 20 TABLET | Refills: 0 | Status: ON HOLD | OUTPATIENT
Start: 2018-12-12 | End: 2018-12-18

## 2018-12-12 RX ORDER — KETOROLAC TROMETHAMINE 30 MG/ML
15 INJECTION, SOLUTION INTRAMUSCULAR; INTRAVENOUS ONCE
Status: COMPLETED | OUTPATIENT
Start: 2018-12-12 | End: 2018-12-12

## 2018-12-12 RX ORDER — HYDROMORPHONE HCL/PF 1 MG/ML
0.5 SYRINGE (ML) INJECTION ONCE
Status: DISCONTINUED | OUTPATIENT
Start: 2018-12-12 | End: 2018-12-12

## 2018-12-12 RX ADMIN — IOHEXOL 100 ML: 350 INJECTION, SOLUTION INTRAVENOUS at 13:20

## 2018-12-12 RX ADMIN — HYDROMORPHONE HYDROCHLORIDE 1 MG: 1 INJECTION, SOLUTION INTRAMUSCULAR; INTRAVENOUS; SUBCUTANEOUS at 12:44

## 2018-12-12 RX ADMIN — KETOROLAC TROMETHAMINE 15 MG: 30 INJECTION, SOLUTION INTRAMUSCULAR at 15:23

## 2018-12-12 NOTE — ED ATTENDING ATTESTATION
Foster Sandoval MD, saw and evaluated the patient  I have discussed the patient with the resident/non-physician practitioner and agree with the resident's/non-physician practitioner's findings, Plan of Care, and MDM as documented in the resident's/non-physician practitioner's note, except where noted  All available labs and Radiology studies were reviewed  At this point I agree with the current assessment done in the Emergency Department  I have conducted an independent evaluation of this patient a history and physical is as follows: The patient presents for evaluation of right upper quadrant abdominal pain he is approximately 1 week status post lap choly    The patient has nausea but no vomiting he has no fever or chills no diarrhea no blood in the stool  Exam well-appearing sclerae anicteric lungs clear heart regular abdomen soft positive bowel sounds tenderness in the right upper quadrant without rebound or guarding no CVA tenderness  Impression abdominal pain status post lap choly  CT abdomen pelvis labs pain control IV fluids  Critical Care Time  CritCare Time    Procedures

## 2018-12-12 NOTE — ED PROVIDER NOTES
History  Chief Complaint   Patient presents with    Abdominal Pain     Pt had gallbladder removed tuesday  Pt reports pain in the RUQ, pt nauseous  pt denies cp, dizziness, fever, lightheadedness  This is a 78-year-old male nine days status post laparoscopic cholecystectomy performed due to an episode of gallstone pancreatitis that occurred 1 month ago presenting to the emergency department with sudden worsening of his postoperative pain last night around 2:00 a m  He reports that over the last week he has been taking oxycodone relieved with relief of his right upper quadrant pain but at 2:00 a m  The pain medications stopped working  He describes the pain as right upper quadrant and sharp  It is worsened by movements without any particular movement worsening it  Is worsened by palpation as well  He has not had any nausea or vomiting or difficulty eating or drinking  He denies any diarrhea constipation  He denies any urinary complaints  Denies any fevers or chills or rash  Physical exam is notable for moderate tenderness to palpation the right upper quadrant without any palpable mass or fluctuance  He does not have pain in his back or anywhere else in his belly  He does not have any peritoneal signs  He has not had any skin changes  His laparoscopic incisions look clean dry and intact  Prior to Admission Medications   Prescriptions Last Dose Informant Patient Reported?  Taking?   acetaminophen (TYLENOL) 325 mg tablet   No No   Sig: Take 3 tablets (975 mg total) by mouth every 8 (eight) hours as needed for mild pain   albuterol (PROVENTIL HFA,VENTOLIN HFA) 90 mcg/act inhaler Unknown at Unknown time  Yes No   Sig: Inhale 1 puff as needed for wheezing   amLODIPine (NORVASC) 10 mg tablet 12/12/2018 at Unknown time  Yes Yes   Sig: Take 10 mg by mouth daily   apixaban (ELIQUIS) 5 mg   Yes No   Sig: Take 5 mg by mouth 2 (two) times a day   aspirin (ECOTRIN LOW STRENGTH) 81 mg EC tablet 12/12/2018 at Unknown time  Yes Yes   Sig: Take 81 mg by mouth daily   atorvastatin (LIPITOR) 80 mg tablet 12/11/2018 at Unknown time  Yes Yes   Sig: Take 80 mg by mouth daily at bedtime     carvedilol (COREG) 25 mg tablet 12/12/2018 at Unknown time  Yes Yes   Sig: Take 50 mg by mouth 2 (two) times a day with meals     glyBURIDE (DIABETA) 5 mg tablet 12/12/2018 at Unknown time  Yes Yes   Sig: Take 5 mg by mouth daily with breakfast   isosorbide mononitrate (IMDUR) 60 mg 24 hr tablet 12/12/2018 at Unknown time  Yes Yes   Sig: Take 60 mg by mouth daily   lisinopril (ZESTRIL) 40 mg tablet 12/12/2018 at Unknown time  Yes Yes   Sig: Take 40 mg by mouth daily     metFORMIN (GLUCOPHAGE) 1000 MG tablet 12/12/2018 at Unknown time  Yes Yes   Sig: Take 1,000 mg by mouth 2 (two) times a day with meals   mirtazapine (REMERON) 15 mg tablet 12/11/2018 at Unknown time  Yes Yes   Sig: Take 15 mg by mouth daily at bedtime   omeprazole (PriLOSEC) 20 mg delayed release capsule 12/12/2018 at Unknown time  Yes Yes   Sig: Take 20 mg by mouth daily   oxyCODONE (ROXICODONE) 5 mg immediate release tablet 12/12/2018 at Unknown time  No Yes   Sig: Take 1-2 tablets (5-10 mg total) by mouth every 4 (four) hours as needed for moderate pain or severe pain for up to 10 days Max Daily Amount: 60 mg   spironolactone (ALDACTONE) 50 mg tablet 12/12/2018 at Unknown time  Yes Yes   Sig: Take 50 mg by mouth daily        Facility-Administered Medications: None       Past Medical History:   Diagnosis Date    CAD (coronary artery disease)     Diabetes mellitus ([address] )     Hypertension        Past Surgical History:   Procedure Laterality Date    CHOLECYSTECTOMY LAPAROSCOPIC N/A 12/4/2018    Procedure: Lazarus Som;  Surgeon: Sony David MD;  Location: [facility];  Service: General    HERNIA REPAIR         Family History   Problem Relation Age of Onset    Hypertension Father      I have reviewed and agree with the history as documented  Social History   Substance Use Topics    Smoking status: Never Smoker    Smokeless tobacco: Never Used    Alcohol use Yes      Comment: social        Review of Systems   Constitutional: Negative for chills and fever  HENT: Negative for congestion and sore throat  Eyes: Negative for visual disturbance  Respiratory: Negative for cough and shortness of breath  Cardiovascular: Negative for chest pain and palpitations  Gastrointestinal: Positive for abdominal pain  Negative for diarrhea, nausea and vomiting  Genitourinary: Negative for difficulty urinating and dysuria  Musculoskeletal: Negative for myalgias  Skin: Negative for rash  Neurological: Negative for weakness, light-headedness, numbness and headaches  Physical Exam  ED Triage Vitals [12/12/18 1145]   Temperature Pulse Respirations Blood Pressure SpO2   (!) 97 4 °F (36 3 °C) 66 18 162/95 95 %      Temp Source Heart Rate Source Patient Position - Orthostatic VS BP Location FiO2 (%)   Oral Monitor Lying Right arm --      Pain Score       Worst Possible Pain           Orthostatic Vital Signs  Vitals:    12/12/18 1145 12/12/18 1400 12/12/18 1515 12/12/18 1600   BP: 162/95 (!) 189/115 168/99 131/77   Pulse: 66 65 62 64   Patient Position - Orthostatic VS: Lying Lying Lying Lying       Physical Exam   Constitutional: He is oriented to person, place, and time  He appears well-developed and well-nourished  No distress  HENT:   Head: Normocephalic and atraumatic  Mouth/Throat: Oropharynx is clear and moist    Eyes: Pupils are equal, round, and reactive to light  EOM are normal    Neck: Normal range of motion  Neck supple  Cardiovascular: Normal rate, regular rhythm, normal heart sounds and intact distal pulses  Pulmonary/Chest: Effort normal and breath sounds normal    Abdominal: Soft  Bowel sounds are normal  There is tenderness (RUQ, neg rodríguez)  There is no rebound and no guarding     Musculoskeletal: Normal range of motion  He exhibits no edema  Neurological: He is alert and oriented to person, place, and time  No cranial nerve deficit  Skin: Skin is warm and dry  Capillary refill takes less than 2 seconds  Nursing note and vitals reviewed  ED Medications  Medications    EMS REPLENISHMENT MED ( Does not apply Given to EMS 12/12/18 1225)   HYDROmorphone (DILAUDID) injection 1 mg (1 mg Intravenous Given 12/12/18 1244)   iohexol (OMNIPAQUE) 350 MG/ML injection (MULTI-DOSE) 100 mL (100 mL Intravenous Given 12/12/18 1320)   ketorolac (TORADOL) injection 15 mg (15 mg Intravenous Given 12/12/18 1523)       Diagnostic Studies  Results Reviewed     Procedure Component Value Units Date/Time    Comprehensive metabolic panel [[id number]]  (Abnormal) Collected:  12/12/18 1147    Lab Status:  Final result Specimen:  Blood from Arm, Left Updated:  12/12/18 1224     Sodium 133 (L) mmol/L      Potassium 5 0 mmol/L      Chloride 99 (L) mmol/L      CO2 27 mmol/L      ANION GAP 7 mmol/L      BUN 13 mg/dL      Creatinine 1 26 mg/dL      Glucose 207 (H) mg/dL      Calcium 9 2 mg/dL      AST 16 U/L      ALT 28 U/L      Alkaline Phosphatase 109 U/L      Total Protein 7 6 g/dL      Albumin 3 6 g/dL      Total Bilirubin 0 80 mg/dL      eGFR 64 ml/min/1 73sq m     Narrative:         National Kidney Disease Education Program recommendations are as follows:  GFR calculation is accurate only with a steady state creatinine  Chronic Kidney disease less than 60 ml/min/1 73 sq  meters  Kidney failure less than 15 ml/min/1 73 sq  meters      Lipase [[id number]]  (Normal) Collected:  12/12/18 1147    Lab Status:  Final result Specimen:  Blood from Arm, Left Updated:  12/12/18 1224     Lipase 116 u/L     CBC and differential [[id number]] Collected:  12/12/18 1147    Lab Status:  Final result Specimen:  Blood from Arm, Left Updated:  12/12/18 1206     WBC 7 78 Thousand/uL      RBC 5 13 Million/uL      Hemoglobin 15 4 g/dL      Hematocrit 44 6 %      MCV 87 fL      MCH 30 0 pg      MCHC 34 5 g/dL      RDW 11 6 %      MPV 9 3 fL      Platelets 191 Thousands/uL      nRBC 0 /100 WBCs      Neutrophils Relative 68 %      Immat GRANS % 0 %      Lymphocytes Relative 24 %      Monocytes Relative 5 %      Eosinophils Relative 2 %      Basophils Relative 1 %      Neutrophils Absolute 5 32 Thousands/µL      Immature Grans Absolute 0 02 Thousand/uL      Lymphocytes Absolute 1 83 Thousands/µL      Monocytes Absolute 0 40 Thousand/µL      Eosinophils Absolute 0 17 Thousand/µL      Basophils Absolute 0 04 Thousands/µL                  CT abdomen pelvis with contrast   Final Result by Tony Swartz MD (12/12 1430)         1  No acute findings in the abdomen or pelvis  2   Status post cholecystectomy since the prior exam   There is a trace amount of tracking fluid in the gallbladder fossa, likely postsurgical   No organized/drainable abscess collection  3   Bilateral nonobstructing renal calculi measuring up to 3 mm, unchanged  Workstation performed: [id number]               Procedures  Procedures      Phone Consults  ED Phone Contact    ED Course  ED Course as of Dec 14 0954   Wed Dec 12, 2018   1350    1451 Pain improved janneth  7/10 now  Will give 15mg toradol      1519 This EKG was interpreted by me  The EKG demonstrates Normal sinus rhythm, normal intervals and axis, normal QRS, no acute ST changes present  56 I spoke with Solange Michel who is on-call for General surgery  She requested that the prescribe the patient extra oxycodone until he has follow-up with them on Monday and that we provide him with their office phone number if he has any further complications  MDM  Number of Diagnoses or Management Options  Abdominal pain:   Post-operative pain:   Diagnosis management comments:  This is a 15-year-old male presenting to the emergency department for evaluation of right upper quadrant pain that is likely secondary to postoperative pain after having his gallbladder removed in 9 days ago  Pain was controlled in the emergency department and he was discharged home with a prescription for oxycodone until he could follow up with the general surgery service  He had a CT scan and blood work done in the emergency department which did not reveal any fluid collections or abscesses or signs of infection  General surgery was consulted and agreed with discharge and symptomatic management until he can follow up in the office  CritCare Time    Disposition  Final diagnoses:   Abdominal pain   Post-operative pain     Time reflects when diagnosis was documented in both MDM as applicable and the Disposition within this note     Time User Action Codes Description Comment    12/12/2018  3:20 PM Ardell [place] Add [R10 9] Abdominal pain     12/12/2018  3:20 PM Ardell [place] Add [G89 18] Post-operative pain     12/12/2018  3:50 PM Ardell [place] Add [K81 9] Cholecystitis       ED Disposition     ED Disposition Condition Comment    Discharge  Thom Pop discharge to home/self care      Condition at discharge: Good        Follow-up Information     Follow up With Specialties Details Why Contact Info Additional 29 [facility] General Surgery Call As needed 9 [address] [id number]  [address] [place] [address], [phone]          Discharge Medication List as of 12/12/2018  4:15 PM      CONTINUE these medications which have CHANGED    Details   oxyCODONE (ROXICODONE) 5 mg immediate release tablet Take 1-2 tablets (5-10 mg total) by mouth every 4 (four) hours as needed for moderate pain or severe pain for up to 10 days Max Daily Amount: 60 mg, Starting Wed 12/12/2018, Until Sat 12/22/2018, Print         CONTINUE these medications which have NOT CHANGED    Details   amLODIPine (NORVASC) 10 mg tablet Take 10 mg by mouth daily, Historical Med      aspirin (ECOTRIN LOW STRENGTH) 81 mg EC tablet Take 81 mg by mouth daily, Historical Med      atorvastatin (LIPITOR) 80 mg tablet Take 80 mg by mouth daily at bedtime  , Historical Med      carvedilol (COREG) 25 mg tablet Take 50 mg by mouth 2 (two) times a day with meals  , Historical Med      glyBURIDE (DIABETA) 5 mg tablet Take 5 mg by mouth daily with breakfast, Historical Med      isosorbide mononitrate (IMDUR) 60 mg 24 hr tablet Take 60 mg by mouth daily, Historical Med      lisinopril (ZESTRIL) 40 mg tablet Take 40 mg by mouth daily  , Historical Med      metFORMIN (GLUCOPHAGE) 1000 MG tablet Take 1,000 mg by mouth 2 (two) times a day with meals, Historical Med      mirtazapine (REMERON) 15 mg tablet Take 15 mg by mouth daily at bedtime, Historical Med      omeprazole (PriLOSEC) 20 mg delayed release capsule Take 20 mg by mouth daily, Historical Med      spironolactone (ALDACTONE) 50 mg tablet Take 50 mg by mouth daily  , Historical Med      acetaminophen (TYLENOL) 325 mg tablet Take 3 tablets (975 mg total) by mouth every 8 (eight) hours as needed for mild pain, Starting Thu 12/6/2018, Print      albuterol (PROVENTIL HFA,VENTOLIN HFA) 90 mcg/act inhaler Inhale 1 puff as needed for wheezing, Historical Med      apixaban (ELIQUIS) 5 mg Take 5 mg by mouth 2 (two) times a day, Historical Med           No discharge procedures on file  ED Provider  Attending physically available and evaluated Varun Ragsdale I managed the patient along with the ED Attending      Electronically Signed by         Kiersten Myers MD  12/14/18 6401

## 2018-12-12 NOTE — ED NOTES
Pt's pain is better, but now states "after the CT scan it's starting to creep up again"     Pio Flores, VERÓNICA  12/12/18 1400

## 2018-12-12 NOTE — DISCHARGE INSTRUCTIONS
Abdominal Pain   WHAT YOU NEED TO KNOW:   Abdominal pain can be dull, achy, or sharp  You may have pain in one area of your abdomen, or in your entire abdomen  Your pain may be caused by a condition such as constipation, food sensitivity or poisoning, infection, or a blockage  Abdominal pain can also be from a hernia, appendicitis, or an ulcer  Liver, gallbladder, or kidney conditions can also cause abdominal pain  The cause of your abdominal pain may be unknown  DISCHARGE INSTRUCTIONS:   Return to the emergency department if:   · You have new chest pain or shortness of breath  · You have pulsing pain in your upper abdomen or lower back that suddenly becomes constant  · Your pain is in the right lower abdominal area and worsens with movement  · You have a fever over 100 4°F (38°C) or shaking chills  · You are vomiting and cannot keep food or liquids down  · Your pain does not improve or gets worse over the next 8 to 12 hours  · You see blood in your vomit or bowel movements, or they look black and tarry  · Your skin or the whites of your eyes turn yellow  · You are a woman and have a large amount of vaginal bleeding that is not your monthly period  Contact your healthcare provider if:   · You have pain in your lower back  · You are a man and have pain in your testicles  · You have pain when you urinate  · You have questions or concerns about your condition or care  Follow up with your healthcare provider within 24 hours or as directed:  Write down your questions so you remember to ask them during your visits  Medicines:   · Medicines  may be given to calm your stomach and prevent vomiting or to decrease pain  Ask how to take pain medicine safely  · Take your medicine as directed  Contact your healthcare provider if you think your medicine is not helping or if you have side effects  Tell him of her if you are allergic to any medicine   Keep a list of the medicines, vitamins, and herbs you take  Include the amounts, and when and why you take them  Bring the list or the pill bottles to follow-up visits  Carry your medicine list with you in case of an emergency  © 2017 [employer]  Information is for End User's use only and may not be sold, redistributed or otherwise used for commercial purposes  All illustrations and images included in CareNotes® are the copyrighted property of [employer]  or Randall Menchaca  The above information is an  only  It is not intended as medical advice for individual conditions or treatments  Talk to your doctor, nurse or pharmacist before following any medical regimen to see if it is safe and effective for you  Laparoscopic Cholecystectomy   WHAT YOU NEED TO KNOW:   Laparoscopic cholecystectomy is surgery to remove your gallbladder  DISCHARGE INSTRUCTIONS:   Medicines: You may need any of the following:  · Prescription pain medicine  helps decrease pain  Do not wait until the pain is severe before you take this medicine  · NSAIDs  decrease swelling and pain  This medicine can be bought with or without a doctor's order  This medicine can cause stomach bleeding or kidney problems in certain people  If you take blood thinner medicine, always ask your healthcare provider if NSAIDs are safe for you  Read the medicine label and follow the directions on it before using this medicine  · Take your medicine as directed  Contact your healthcare provider if you think your medicine is not helping or if you have side effects  Tell him or her if you are allergic to any medicine  Keep a list of the medicines, vitamins, and herbs you take  Include the amounts, and when and why you take them  Bring the list or the pill bottles to follow-up visits  Carry your medicine list with you in case of an emergency    Follow up with your healthcare provider 2 weeks after surgery, or as directed:  Write down your questions so you remember to ask them during your visits  Wound care:  Care for your surgical wounds as directed  Keep the wounds clean and dry  You may take a shower the day after your surgery  What to eat after surgery:  Eat low-fat foods for 4 to 6 weeks while your body learns to digest fat without a gallbladder  Slowly increase the amount of fat that you eat  Drink plenty of liquids  Ask how much liquid to drink and which liquids are best for you  When to return to work and other activities: You may return to work or other activities as soon as your pain is controlled and you feel comfortable  For many people, this is 5 to 7 days after surgery  Contact your healthcare provider if:   · You have a fever over 101°F (38°C) or chills  · You have pain or nausea that is not relieved by medicine  · You have redness and swelling around your incisions, or blood or pus is leaking from your incisions  · You are constipated or have diarrhea  · Your skin or eyes are yellow, or your bowel movements are pale  · You have questions or concerns about your surgery, condition, or care  Seek care immediately or call 911 if:   · You cannot stop vomiting  · Your bowel movements are black or bloody  · You have pain in your abdomen and it is swollen or hard  · Your arm or leg feels warm, tender, and painful  It may look swollen and red  · You feel lightheaded, short of breath, and have chest pain  · You cough up blood  © 2017 [employer] Information is for End User's use only and may not be sold, redistributed or otherwise used for commercial purposes  All illustrations and images included in CareNotes® are the copyrighted property of [employer] , [employer]  or Randall Menchaca  The above information is an  only  It is not intended as medical advice for individual conditions or treatments   Talk to your doctor, nurse or pharmacist before following any medical regimen to see if it is safe and effective for you

## 2018-12-12 NOTE — CONSULTS
Consultation - General Surgery   Myah Coronel 52 y o  male MRN: [id number]  Unit/Bed#: RW 01 Encounter: [id number]    Assessment/Plan     Assessment:  53M s/p lap jhon on 12/4 (POD 8) with RUQ abdominal pain  No evidence of post-op biloma or infection  Sx consisted with expected post-op pain    Plan:  Stable for d/c home from ED  Continue PRN analgesia with tylenol and prn oxycodone  Diet as tolerated  Follow-up already arranged with me on Monday    History of Present Illness     HPI:  Myah Coronel is a 52 y o  male who is recently s/p lap jhon on 12/4 for acute on chronic cholecystitis  He states he was doing well the first week after surgery but ran out of narcotics and began having severe RUQ pain today  Pain not relieved with tylenol  Denies fevers, chills, N/V  Tolerating a diet and having normal bowel habits  Consults    Review of Systems   Constitutional: Negative for activity change, chills, fever and unexpected weight change  HENT: Negative  Negative for congestion, sneezing and sore throat  Eyes: Negative  Negative for pain and redness  Respiratory: Negative  Negative for chest tightness, shortness of breath and wheezing  Cardiovascular: Negative  Negative for chest pain and palpitations  Gastrointestinal: Positive for abdominal pain  Negative for abdominal distention, constipation, diarrhea, nausea and vomiting  Endocrine: Negative  Negative for cold intolerance and heat intolerance  Genitourinary: Negative  Negative for dysuria, hematuria and urgency  Musculoskeletal: Negative  Negative for arthralgias and back pain  Skin: Negative for color change and rash  Allergic/Immunologic: Negative  Negative for environmental allergies and food allergies  Neurological: Negative  Negative for dizziness and light-headedness  Hematological: Negative  Negative for adenopathy  Does not bruise/bleed easily  Psychiatric/Behavioral: Negative    Negative for agitation and sleep disturbance  The patient is not nervous/anxious  Historical Information   Past Medical History:   Diagnosis Date    CAD (coronary artery disease)     Diabetes mellitus ([facility] [address] )     Hypertension      Past Surgical History:   Procedure Laterality Date    CHOLECYSTECTOMY LAPAROSCOPIC N/A 12/4/2018    Procedure: CHOLECYSTECTOMY LAPAROSCOPIC;  Surgeon: Tera Easley MD;  [facility];  Service: General    HERNIA REPAIR       Social History   History   Alcohol Use    Yes     Comment: social     History   Drug Use No     History   Smoking Status    Never Smoker   Smokeless Tobacco    Never Used     Family History: non-contributory    Meds/Allergies   all current active meds have been reviewed     No Known Allergies    Objective   First Vitals:   Blood Pressure: 162/95 (12/12/18 1145)  Pulse: 66 (12/12/18 1145)  Temperature: (!) 97 4 °F (36 3 °C) (12/12/18 1145)  Temp Source: Oral (12/12/18 1145)  Respirations: 18 (12/12/18 1145)  Height: 6' 2" (188 cm) (12/12/18 1145)  Weight - Scale: 118 kg (260 lb) (12/12/18 1145)  SpO2: 95 % (12/12/18 1145)    Current Vitals:   Blood Pressure: 131/77 (12/12/18 1600)  Pulse: 64 (12/12/18 1600)  Temperature: (!) 97 4 °F (36 3 °C) (12/12/18 1145)  Temp Source: Oral (12/12/18 1145)  Respirations: 19 (12/12/18 1600)  Height: 6' 2" (188 cm) (12/12/18 1145)  Weight - Scale: 118 kg (260 lb) (12/12/18 1145)  SpO2: 92 % (12/12/18 1600)    Physical Exam   Constitutional: He is oriented to person, place, and time  He appears well-developed and well-nourished  No distress  HENT:   Head: Normocephalic and atraumatic  Mouth/Throat: Oropharynx is clear and moist    Eyes: Pupils are equal, round, and reactive to light  Conjunctivae and EOM are normal  No scleral icterus  Neck: Normal range of motion  Neck supple  No tracheal deviation present  No thyromegaly present  Cardiovascular: Normal rate, regular rhythm, normal heart sounds and intact distal pulses    Exam reveals no gallop and no friction rub  No murmur heard  Pulmonary/Chest: Effort normal and breath sounds normal  No stridor  No respiratory distress  He has no wheezes  He has no rales  Abdominal: Soft  He exhibits no distension  There is no rebound and no guarding  Incisions healing well, c/d/i without erythema or drainage     Musculoskeletal: Normal range of motion  Neurological: He is alert and oriented to person, place, and time  Skin: Skin is warm and dry  He is not diaphoretic  Psychiatric: He has a normal mood and affect  His behavior is normal  Judgment and thought content normal    Nursing note and vitals reviewed  Lab Results:   I have personally reviewed pertinent lab results  , CBC:   Lab Results   Component Value Date    WBC 7 78 12/12/2018    HGB 15 4 12/12/2018    HCT 44 6 12/12/2018    MCV 87 12/12/2018     12/12/2018    MCH 30 0 12/12/2018    MCHC 34 5 12/12/2018    RDW 11 6 12/12/2018    MPV 9 3 12/12/2018    NRBC 0 12/12/2018   , CMP:   Lab Results   Component Value Date    SODIUM 133 (L) 12/12/2018    K 5 0 12/12/2018    CL 99 (L) 12/12/2018    CO2 27 12/12/2018    BUN 13 12/12/2018    CREATININE 1 26 12/12/2018    CALCIUM 9 2 12/12/2018    AST 16 12/12/2018    ALT 28 12/12/2018    ALKPHOS 109 12/12/2018    EGFR 64 12/12/2018   , Lipase:   Lab Results   Component Value Date    LIPASE 116 12/12/2018     Imaging: I have personally reviewed pertinent reports  and I have personally reviewed pertinent films in PACS  EKG, Pathology, and Other Studies: N/A    Counseling / Coordination of Care  Total floor / unit time spent today 30 minutes  Greater than 50% of total time was spent with the patient and / or family counseling and / or coordination of care  A description of the counseling / coordination of care: 30

## 2018-12-13 LAB
ATRIAL RATE: 68 BPM
P AXIS: 54 DEGREES
PR INTERVAL: 170 MS
QRS AXIS: -66 DEGREES
QRSD INTERVAL: 94 MS
QT INTERVAL: 404 MS
QTC INTERVAL: 429 MS
T WAVE AXIS: 63 DEGREES
VENTRICULAR RATE: 68 BPM

## 2018-12-13 PROCEDURE — 93010 ELECTROCARDIOGRAM REPORT: CPT | Performed by: SURGERY

## 2018-12-14 LAB
ATRIAL RATE: 82 BPM
P AXIS: 54 DEGREES
PR INTERVAL: 170 MS
QRS AXIS: -69 DEGREES
QRSD INTERVAL: 102 MS
QT INTERVAL: 408 MS
QTC INTERVAL: 476 MS
T WAVE AXIS: 29 DEGREES
VENTRICULAR RATE: 82 BPM

## 2018-12-14 PROCEDURE — 93010 ELECTROCARDIOGRAM REPORT: CPT | Performed by: INTERNAL MEDICINE

## 2018-12-15 ENCOUNTER — HOSPITAL ENCOUNTER (INPATIENT)
Facility: HOSPITAL | Age: 53
LOS: 3 days | Discharge: HOME | DRG: 282 | End: 2018-12-18
Attending: EMERGENCY MEDICINE | Admitting: SURGERY
Payer: COMMERCIAL

## 2018-12-15 ENCOUNTER — APPOINTMENT (EMERGENCY)
Dept: RADIOLOGY | Facility: HOSPITAL | Age: 53
DRG: 282 | End: 2018-12-15
Payer: COMMERCIAL

## 2018-12-15 DIAGNOSIS — K85.90 PANCREATITIS: ICD-10-CM

## 2018-12-15 DIAGNOSIS — K85.90 ACUTE PANCREATITIS, UNSPECIFIED COMPLICATION STATUS, UNSPECIFIED PANCREATITIS TYPE: ICD-10-CM

## 2018-12-15 DIAGNOSIS — R10.11 RUQ ABDOMINAL PAIN: Primary | ICD-10-CM

## 2018-12-15 DIAGNOSIS — R11.2 NAUSEA AND VOMITING: ICD-10-CM

## 2018-12-15 DIAGNOSIS — K81.9 CHOLECYSTITIS: ICD-10-CM

## 2018-12-15 LAB
ALBUMIN SERPL BCP-MCNC: 3.5 G/DL (ref 3.5–5)
ALP SERPL-CCNC: 574 U/L (ref 46–116)
ALT SERPL W P-5'-P-CCNC: 406 U/L (ref 12–78)
ANION GAP SERPL CALCULATED.3IONS-SCNC: 5 MMOL/L (ref 4–13)
AST SERPL W P-5'-P-CCNC: 397 U/L (ref 5–45)
BASOPHILS # BLD AUTO: 0.04 THOUSANDS/ΜL (ref 0–0.1)
BASOPHILS NFR BLD AUTO: 1 % (ref 0–1)
BILIRUB SERPL-MCNC: 3.44 MG/DL (ref 0.2–1)
BUN SERPL-MCNC: 19 MG/DL (ref 5–25)
CALCIUM SERPL-MCNC: 9.6 MG/DL (ref 8.3–10.1)
CHLORIDE SERPL-SCNC: 99 MMOL/L (ref 100–108)
CO2 SERPL-SCNC: 26 MMOL/L (ref 21–32)
CREAT SERPL-MCNC: 1.44 MG/DL (ref 0.6–1.3)
EOSINOPHIL # BLD AUTO: 0.68 THOUSAND/ΜL (ref 0–0.61)
EOSINOPHIL NFR BLD AUTO: 8 % (ref 0–6)
ERYTHROCYTE [DISTWIDTH] IN BLOOD BY AUTOMATED COUNT: 11.7 % (ref 11.6–15.1)
GFR SERPL CREATININE-BSD FRML MDRD: 54 ML/MIN/1.73SQ M
GLUCOSE SERPL-MCNC: 240 MG/DL (ref 65–140)
HCT VFR BLD AUTO: 48 % (ref 36.5–49.3)
HGB BLD-MCNC: 16.8 G/DL (ref 12–17)
IMM GRANULOCYTES # BLD AUTO: 0.01 THOUSAND/UL (ref 0–0.2)
IMM GRANULOCYTES NFR BLD AUTO: 0 % (ref 0–2)
LIPASE SERPL-CCNC: ABNORMAL U/L (ref 73–393)
LYMPHOCYTES # BLD AUTO: 1.55 THOUSANDS/ΜL (ref 0.6–4.47)
LYMPHOCYTES NFR BLD AUTO: 19 % (ref 14–44)
MCH RBC QN AUTO: 30.2 PG (ref 26.8–34.3)
MCHC RBC AUTO-ENTMCNC: 35 G/DL (ref 31.4–37.4)
MCV RBC AUTO: 86 FL (ref 82–98)
MONOCYTES # BLD AUTO: 0.45 THOUSAND/ΜL (ref 0.17–1.22)
MONOCYTES NFR BLD AUTO: 5 % (ref 4–12)
NEUTROPHILS # BLD AUTO: 5.57 THOUSANDS/ΜL (ref 1.85–7.62)
NEUTS SEG NFR BLD AUTO: 67 % (ref 43–75)
NRBC BLD AUTO-RTO: 0 /100 WBCS
PLATELET # BLD AUTO: 310 THOUSANDS/UL (ref 149–390)
PMV BLD AUTO: 9.3 FL (ref 8.9–12.7)
POTASSIUM SERPL-SCNC: 5.6 MMOL/L (ref 3.5–5.3)
PROT SERPL-MCNC: 7.3 G/DL (ref 6.4–8.2)
RBC # BLD AUTO: 5.57 MILLION/UL (ref 3.88–5.62)
SODIUM SERPL-SCNC: 130 MMOL/L (ref 136–145)
WBC # BLD AUTO: 8.3 THOUSAND/UL (ref 4.31–10.16)

## 2018-12-15 PROCEDURE — 83690 ASSAY OF LIPASE: CPT | Performed by: EMERGENCY MEDICINE

## 2018-12-15 PROCEDURE — 74177 CT ABD & PELVIS W/CONTRAST: CPT

## 2018-12-15 PROCEDURE — 96365 THER/PROPH/DIAG IV INF INIT: CPT

## 2018-12-15 PROCEDURE — 36415 COLL VENOUS BLD VENIPUNCTURE: CPT | Performed by: EMERGENCY MEDICINE

## 2018-12-15 PROCEDURE — 99024 POSTOP FOLLOW-UP VISIT: CPT | Performed by: SURGERY

## 2018-12-15 PROCEDURE — 99285 EMERGENCY DEPT VISIT HI MDM: CPT

## 2018-12-15 PROCEDURE — 85025 COMPLETE CBC W/AUTO DIFF WBC: CPT | Performed by: EMERGENCY MEDICINE

## 2018-12-15 PROCEDURE — 80053 COMPREHEN METABOLIC PANEL: CPT | Performed by: EMERGENCY MEDICINE

## 2018-12-15 PROCEDURE — 96375 TX/PRO/DX INJ NEW DRUG ADDON: CPT

## 2018-12-15 RX ORDER — ONDANSETRON 2 MG/ML
4 INJECTION INTRAMUSCULAR; INTRAVENOUS ONCE
Status: COMPLETED | OUTPATIENT
Start: 2018-12-15 | End: 2018-12-15

## 2018-12-15 RX ORDER — HEPARIN SODIUM 5000 [USP'U]/ML
5000 INJECTION, SOLUTION INTRAVENOUS; SUBCUTANEOUS EVERY 8 HOURS SCHEDULED
Status: DISCONTINUED | OUTPATIENT
Start: 2018-12-15 | End: 2018-12-18 | Stop reason: HOSPADM

## 2018-12-15 RX ORDER — SUCRALFATE ORAL 1 G/10ML
1000 SUSPENSION ORAL ONCE
Status: COMPLETED | OUTPATIENT
Start: 2018-12-15 | End: 2018-12-15

## 2018-12-15 RX ORDER — SODIUM CHLORIDE, SODIUM GLUCONATE, SODIUM ACETATE, POTASSIUM CHLORIDE, MAGNESIUM CHLORIDE, SODIUM PHOSPHATE, DIBASIC, AND POTASSIUM PHOSPHATE .53; .5; .37; .037; .03; .012; .00082 G/100ML; G/100ML; G/100ML; G/100ML; G/100ML; G/100ML; G/100ML
1000 INJECTION, SOLUTION INTRAVENOUS ONCE
Status: COMPLETED | OUTPATIENT
Start: 2018-12-15 | End: 2018-12-16

## 2018-12-15 RX ORDER — HYDROMORPHONE HCL/PF 1 MG/ML
0.5 SYRINGE (ML) INJECTION
Status: DISCONTINUED | OUTPATIENT
Start: 2018-12-15 | End: 2018-12-16

## 2018-12-15 RX ORDER — HYDROMORPHONE HCL/PF 1 MG/ML
0.5 SYRINGE (ML) INJECTION ONCE
Status: COMPLETED | OUTPATIENT
Start: 2018-12-15 | End: 2018-12-15

## 2018-12-15 RX ORDER — SODIUM CHLORIDE 9 MG/ML
150 INJECTION, SOLUTION INTRAVENOUS CONTINUOUS
Status: DISCONTINUED | OUTPATIENT
Start: 2018-12-15 | End: 2018-12-17

## 2018-12-15 RX ORDER — PROMETHAZINE HYDROCHLORIDE 25 MG/ML
25 INJECTION, SOLUTION INTRAMUSCULAR; INTRAVENOUS EVERY 6 HOURS PRN
Status: DISCONTINUED | OUTPATIENT
Start: 2018-12-15 | End: 2018-12-16

## 2018-12-15 RX ORDER — KETOROLAC TROMETHAMINE 30 MG/ML
15 INJECTION, SOLUTION INTRAMUSCULAR; INTRAVENOUS ONCE
Status: COMPLETED | OUTPATIENT
Start: 2018-12-15 | End: 2018-12-15

## 2018-12-15 RX ORDER — ONDANSETRON 2 MG/ML
4 INJECTION INTRAMUSCULAR; INTRAVENOUS EVERY 6 HOURS PRN
Status: DISCONTINUED | OUTPATIENT
Start: 2018-12-15 | End: 2018-12-18 | Stop reason: HOSPADM

## 2018-12-15 RX ORDER — FAMOTIDINE 20 MG/1
20 TABLET, FILM COATED ORAL ONCE
Status: COMPLETED | OUTPATIENT
Start: 2018-12-15 | End: 2018-12-15

## 2018-12-15 RX ORDER — LABETALOL HYDROCHLORIDE 5 MG/ML
20 INJECTION, SOLUTION INTRAVENOUS EVERY 6 HOURS PRN
Status: DISCONTINUED | OUTPATIENT
Start: 2018-12-15 | End: 2018-12-18 | Stop reason: HOSPADM

## 2018-12-15 RX ORDER — PANTOPRAZOLE SODIUM 40 MG/1
40 INJECTION, POWDER, FOR SOLUTION INTRAVENOUS
Status: DISCONTINUED | OUTPATIENT
Start: 2018-12-16 | End: 2018-12-18

## 2018-12-15 RX ORDER — HYDRALAZINE HYDROCHLORIDE 20 MG/ML
10 INJECTION INTRAMUSCULAR; INTRAVENOUS EVERY 6 HOURS PRN
Status: DISCONTINUED | OUTPATIENT
Start: 2018-12-15 | End: 2018-12-18 | Stop reason: HOSPADM

## 2018-12-15 RX ADMIN — HYDROMORPHONE HYDROCHLORIDE 0.5 MG: 1 INJECTION, SOLUTION INTRAMUSCULAR; INTRAVENOUS; SUBCUTANEOUS at 22:05

## 2018-12-15 RX ADMIN — HYDROMORPHONE HYDROCHLORIDE 0.5 MG: 1 INJECTION, SOLUTION INTRAMUSCULAR; INTRAVENOUS; SUBCUTANEOUS at 23:36

## 2018-12-15 RX ADMIN — IOHEXOL 100 ML: 350 INJECTION, SOLUTION INTRAVENOUS at 20:50

## 2018-12-15 RX ADMIN — KETOROLAC TROMETHAMINE 15 MG: 30 INJECTION, SOLUTION INTRAMUSCULAR at 19:08

## 2018-12-15 RX ADMIN — FAMOTIDINE 20 MG: 20 TABLET ORAL at 19:08

## 2018-12-15 RX ADMIN — SODIUM CHLORIDE 150 ML/HR: 0.9 INJECTION, SOLUTION INTRAVENOUS at 23:33

## 2018-12-15 RX ADMIN — SUCRALFATE 1000 MG: 1 SUSPENSION ORAL at 19:08

## 2018-12-15 RX ADMIN — ONDANSETRON 4 MG: 2 INJECTION INTRAMUSCULAR; INTRAVENOUS at 19:07

## 2018-12-15 RX ADMIN — SODIUM CHLORIDE, SODIUM GLUCONATE, SODIUM ACETATE, POTASSIUM CHLORIDE, MAGNESIUM CHLORIDE, SODIUM PHOSPHATE, DIBASIC, AND POTASSIUM PHOSPHATE 1000 ML: .53; .5; .37; .037; .03; .012; .00082 INJECTION, SOLUTION INTRAVENOUS at 22:05

## 2018-12-15 NOTE — ED ATTENDING ATTESTATION
Deborah Locke MD, saw and evaluated the patient  I have discussed the patient with the resident/non-physician practitioner and agree with the resident's/non-physician practitioner's findings, Plan of Care, and MDM as documented in the resident's/non-physician practitioner's note, except where noted  All available labs and Radiology studies were reviewed  At this point I agree with the current assessment done in the Emergency Department  I have conducted an independent evaluation of this patient a history and physical is as follows: Worsening epigastric pain, seen 4d ago for abd pain, recently s/p cholecystectomy  On exam, moderate tender in epigastrium  No r/g  Plan - check labs to eval for acute metabolic derangement, pancreatitis, elevated LFTs  CT to eval for obstruction vs post-surgical process  Labs and Ct with evidence of acute pancreatitis  Admit to general surgery         Critical Care Time  CritCare Time    Procedures

## 2018-12-16 ENCOUNTER — APPOINTMENT (INPATIENT)
Dept: RADIOLOGY | Facility: HOSPITAL | Age: 53
DRG: 282 | End: 2018-12-16
Payer: COMMERCIAL

## 2018-12-16 LAB
ALBUMIN SERPL BCP-MCNC: 3.4 G/DL (ref 3.5–5)
ALP SERPL-CCNC: 558 U/L (ref 46–116)
ALT SERPL W P-5'-P-CCNC: 359 U/L (ref 12–78)
ANION GAP SERPL CALCULATED.3IONS-SCNC: 8 MMOL/L (ref 4–13)
AST SERPL W P-5'-P-CCNC: 249 U/L (ref 5–45)
BILIRUB SERPL-MCNC: 1.01 MG/DL (ref 0.2–1)
BUN SERPL-MCNC: 17 MG/DL (ref 5–25)
CALCIUM SERPL-MCNC: 8.4 MG/DL (ref 8.3–10.1)
CHLORIDE SERPL-SCNC: 101 MMOL/L (ref 100–108)
CO2 SERPL-SCNC: 26 MMOL/L (ref 21–32)
CREAT SERPL-MCNC: 1.38 MG/DL (ref 0.6–1.3)
ERYTHROCYTE [DISTWIDTH] IN BLOOD BY AUTOMATED COUNT: 11.8 % (ref 11.6–15.1)
GFR SERPL CREATININE-BSD FRML MDRD: 57 ML/MIN/1.73SQ M
GLUCOSE SERPL-MCNC: 111 MG/DL (ref 65–140)
GLUCOSE SERPL-MCNC: 112 MG/DL (ref 65–140)
GLUCOSE SERPL-MCNC: 118 MG/DL (ref 65–140)
GLUCOSE SERPL-MCNC: 182 MG/DL (ref 65–140)
GLUCOSE SERPL-MCNC: 98 MG/DL (ref 65–140)
HCT VFR BLD AUTO: 45.5 % (ref 36.5–49.3)
HGB BLD-MCNC: 15.8 G/DL (ref 12–17)
LIPASE SERPL-CCNC: 5626 U/L (ref 73–393)
MAGNESIUM SERPL-MCNC: 2.4 MG/DL (ref 1.6–2.6)
MCH RBC QN AUTO: 30 PG (ref 26.8–34.3)
MCHC RBC AUTO-ENTMCNC: 34.7 G/DL (ref 31.4–37.4)
MCV RBC AUTO: 87 FL (ref 82–98)
PLATELET # BLD AUTO: 298 THOUSANDS/UL (ref 149–390)
PMV BLD AUTO: 9.2 FL (ref 8.9–12.7)
POTASSIUM SERPL-SCNC: 3.7 MMOL/L (ref 3.5–5.3)
PROT SERPL-MCNC: 6.8 G/DL (ref 6.4–8.2)
RBC # BLD AUTO: 5.26 MILLION/UL (ref 3.88–5.62)
SODIUM SERPL-SCNC: 135 MMOL/L (ref 136–145)
WBC # BLD AUTO: 12.18 THOUSAND/UL (ref 4.31–10.16)

## 2018-12-16 PROCEDURE — 99024 POSTOP FOLLOW-UP VISIT: CPT | Performed by: SURGERY

## 2018-12-16 PROCEDURE — G8978 MOBILITY CURRENT STATUS: HCPCS

## 2018-12-16 PROCEDURE — 83735 ASSAY OF MAGNESIUM: CPT | Performed by: ORTHOPAEDIC SURGERY

## 2018-12-16 PROCEDURE — 80053 COMPREHEN METABOLIC PANEL: CPT | Performed by: ORTHOPAEDIC SURGERY

## 2018-12-16 PROCEDURE — 74183 MRI ABD W/O CNTR FLWD CNTR: CPT

## 2018-12-16 PROCEDURE — 97163 PT EVAL HIGH COMPLEX 45 MIN: CPT

## 2018-12-16 PROCEDURE — 85027 COMPLETE CBC AUTOMATED: CPT | Performed by: ORTHOPAEDIC SURGERY

## 2018-12-16 PROCEDURE — 83690 ASSAY OF LIPASE: CPT | Performed by: ORTHOPAEDIC SURGERY

## 2018-12-16 PROCEDURE — A9585 GADOBUTROL INJECTION: HCPCS | Performed by: INTERNAL MEDICINE

## 2018-12-16 PROCEDURE — 82948 REAGENT STRIP/BLOOD GLUCOSE: CPT

## 2018-12-16 PROCEDURE — C9113 INJ PANTOPRAZOLE SODIUM, VIA: HCPCS | Performed by: ORTHOPAEDIC SURGERY

## 2018-12-16 PROCEDURE — G8979 MOBILITY GOAL STATUS: HCPCS

## 2018-12-16 RX ORDER — PROMETHAZINE HYDROCHLORIDE 25 MG/ML
25 INJECTION, SOLUTION INTRAMUSCULAR; INTRAVENOUS EVERY 6 HOURS PRN
Status: DISCONTINUED | OUTPATIENT
Start: 2018-12-16 | End: 2018-12-18 | Stop reason: HOSPADM

## 2018-12-16 RX ORDER — HYDROMORPHONE HCL/PF 1 MG/ML
0.5 SYRINGE (ML) INJECTION EVERY 2 HOUR PRN
Status: DISCONTINUED | OUTPATIENT
Start: 2018-12-16 | End: 2018-12-18

## 2018-12-16 RX ADMIN — SODIUM CHLORIDE, SODIUM LACTATE, POTASSIUM CHLORIDE, AND CALCIUM CHLORIDE 1000 ML: .6; .31; .03; .02 INJECTION, SOLUTION INTRAVENOUS at 06:44

## 2018-12-16 RX ADMIN — HEPARIN SODIUM 5000 UNITS: 5000 INJECTION INTRAVENOUS; SUBCUTANEOUS at 05:47

## 2018-12-16 RX ADMIN — HYDROMORPHONE HYDROCHLORIDE 0.5 MG: 1 INJECTION, SOLUTION INTRAMUSCULAR; INTRAVENOUS; SUBCUTANEOUS at 08:46

## 2018-12-16 RX ADMIN — HYDROMORPHONE HYDROCHLORIDE 0.5 MG: 1 INJECTION, SOLUTION INTRAMUSCULAR; INTRAVENOUS; SUBCUTANEOUS at 11:49

## 2018-12-16 RX ADMIN — PANTOPRAZOLE SODIUM 40 MG: 40 INJECTION, POWDER, FOR SOLUTION INTRAVENOUS at 08:39

## 2018-12-16 RX ADMIN — HYDROMORPHONE HYDROCHLORIDE 0.5 MG: 1 INJECTION, SOLUTION INTRAMUSCULAR; INTRAVENOUS; SUBCUTANEOUS at 05:47

## 2018-12-16 RX ADMIN — SODIUM CHLORIDE 150 ML/HR: 0.9 INJECTION, SOLUTION INTRAVENOUS at 04:49

## 2018-12-16 RX ADMIN — HEPARIN SODIUM 5000 UNITS: 5000 INJECTION INTRAVENOUS; SUBCUTANEOUS at 14:50

## 2018-12-16 RX ADMIN — SODIUM CHLORIDE 150 ML/HR: 0.9 INJECTION, SOLUTION INTRAVENOUS at 20:14

## 2018-12-16 RX ADMIN — HYDROMORPHONE HYDROCHLORIDE 0.5 MG: 1 INJECTION, SOLUTION INTRAMUSCULAR; INTRAVENOUS; SUBCUTANEOUS at 02:45

## 2018-12-16 RX ADMIN — HYDRALAZINE HYDROCHLORIDE 10 MG: 20 INJECTION INTRAMUSCULAR; INTRAVENOUS at 19:20

## 2018-12-16 RX ADMIN — INSULIN LISPRO 2 UNITS: 100 INJECTION, SOLUTION INTRAVENOUS; SUBCUTANEOUS at 00:23

## 2018-12-16 RX ADMIN — HYDROMORPHONE HYDROCHLORIDE 0.5 MG: 1 INJECTION, SOLUTION INTRAMUSCULAR; INTRAVENOUS; SUBCUTANEOUS at 20:41

## 2018-12-16 RX ADMIN — GADOBUTROL 12 ML: 604.72 INJECTION INTRAVENOUS at 15:40

## 2018-12-16 RX ADMIN — SODIUM CHLORIDE 150 ML/HR: 0.9 INJECTION, SOLUTION INTRAVENOUS at 12:47

## 2018-12-16 RX ADMIN — HYDROMORPHONE HYDROCHLORIDE 0.5 MG: 1 INJECTION, SOLUTION INTRAMUSCULAR; INTRAVENOUS; SUBCUTANEOUS at 17:53

## 2018-12-16 RX ADMIN — HYDROMORPHONE HYDROCHLORIDE 0.5 MG: 1 INJECTION, SOLUTION INTRAMUSCULAR; INTRAVENOUS; SUBCUTANEOUS at 22:41

## 2018-12-16 RX ADMIN — LABETALOL 20 MG/4 ML (5 MG/ML) INTRAVENOUS SYRINGE 20 MG: at 16:32

## 2018-12-16 RX ADMIN — HYDROMORPHONE HYDROCHLORIDE 0.5 MG: 1 INJECTION, SOLUTION INTRAMUSCULAR; INTRAVENOUS; SUBCUTANEOUS at 14:50

## 2018-12-16 NOTE — PHYSICAL THERAPY NOTE
Physical Therapy Evaluation:    2 forms of pt ID verified:name,birthdate and pt ID portillo    Patient's Name: Milan Estrada    Admitting Diagnosis  Pancreatitis [K85 90]  Abdominal pain [R10 9]  Nausea and vomiting [R11 2]  RUQ abdominal pain [R10 11]  Acute pancreatitis, unspecified complication status, unspecified pancreatitis type [K85 90]    Problem List  Patient Active Problem List   Diagnosis    Acute pancreatitis    Cholecystitis       Past Medical History  Past Medical History:   Diagnosis Date    CAD (coronary artery disease)     [facility] ([facility] [address] )     Hypertension        Past Surgical History  Past Surgical History:   Procedure Laterality Date    CHOLECYSTECTOMY LAPAROSCOPIC N/A 12/4/2018    Procedure: CHOLECYSTECTOMY LAPAROSCOPIC;  Surgeon: Eugenio Joyner MD;  [facility];  Service: General    HERNIA REPAIR          12/16/18 1505   Note Type   Note type Eval only   Pain Assessment   Pain Assessment 0-10   Pain Score 8   Pain Type Acute pain   Pain Location Abdomen   Pain Orientation Left;Upper;Mid   Hospital Pain Intervention(s) Repositioned; Ambulation/increased activity; Elevated; Emotional support; Environmental changes; Rest   Home Living   Type of [address] [place] [address] One level;Stairs to enter with rails  ([place])   Home Equipment (no use of DME PTA)   Additional Comments pt reports being completely I PTA,lives with mother and sister,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,no use of DME PTA;recent surgical procedure and readmission 2* acute pancreatitis and gall stones   Prior Function   Level of [place] Independent with ADLs and functional mobility  (per pt PTA)   Lives With [facility] Help From Family  (as needed per pt PTA)   ADL Assistance Independent   IADLs Independent   Falls in the last 6 months 0   Vocational Other (Comment)  (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,trying to find a job)   Restrictions/Precautions   Other Precautions Impulsive;Pain; Fall Risk;Multiple lines   General   Additional Pertinent History acute pancreatitis,s/p lap cholecystectomy,readmission,scheduled gall stone removal per pt   Family/Caregiver Present No   Cognition   Overall Cognitive Status WFL   Arousal/Participation Cooperative   Orientation Level Oriented X4   Following Commands Follows one step commands with increased time or repetition  (2* inc pain and impulsivity)   RLE Assessment   RLE Assessment WFL   LLE Assessment   LLE Assessment WFL   Coordination   Movements are Fluid and Coordinated 0   Coordination and Movement Description pain,ataxic and unsteady gait pattern,impulsive at times   Sensation [facility]   Light Touch   RLE Light Touch Grossly intact   LLE Light Touch Grossly intact   Bed Mobility   Rolling L 5  Supervision   Additional items Assist x 1;Bedrails;Verbal cues   Supine to Sit 4  Minimal assistance   Additional items Assist x 1;Bedrails; Increased time required;Verbal cues   Sit to Supine 5  Supervision   Additional items Assist x 1;Bedrails;Verbal cues; Impulsive   Transfers   Sit to Stand 5  Supervision   Additional items Assist x 1;Bedrails; Impulsive   Stand to Sit 5  Supervision   Additional items Assist x 1;Bedrails; Impulsive   Ambulation/Elevation   Gait pattern Antalgic;Narrow DALTON; Forward Flexion; Inconsistent graciela; Foward flexed; Short stride; Ataxia   Gait Assistance 5  Supervision   Additional items Assist x 1;Verbal cues   Assistive Device None   Distance 100 feet without use of DME on tile and carpet surface'no LOB noted and/or observed during upright mobility   Balance   Static Sitting Good  (at EOB pre and post mobility)   Dynamic Sitting Fair   Static Standing Fair   Dynamic Standing Fair   Ambulatory Fair   Endurance Deficit   Endurance Deficit Yes   Endurance Deficit Description pain   Activity Tolerance   Activity Tolerance Patient limited by pain  (good)   Nurse Made Aware yes   Assessment   Prognosis Good   Problem List Decreased endurance; Impaired balance;Decreased mobility; Decreased safety awareness;Decreased skin integrity;Pain   Assessment Pt is a 51 y/o male admitted to [facility] 2* acute pancreatitis,scheduled removal of gall stones and recent surgical procedure with readmission  Pt lives with mother and sister in 1 SH,(+)[place],no use of DME PTA,reports no recent falls and 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  Pt is currently not at functional mobility baseline,needs Ax1 for mobility,ataxic and unsteady gait pattern,multiple lines,IV medicine management,inc pain and ongoing medical care  Pt demonstrates limited mobility and gait 2* dec endurance,dec balance,inc ABD pain,ataxic and unsteady gait pattern and needs S for BM,transfers and gait without use of DME  Pt would cont to benefit from skilled inpt PT services to maximize functional independence  Barriers to Discharge Inaccessible home environment  ([place],pt reports being caregiver for mother PTA)   Goals   Patient Goals to dec the pain   STG Expiration Date 12/26/18   Short Term Goal #1 in 7-10 days:pt will be able to ambulate >300 feet without use of DME on various surfaces without LOB and no rest breaks S->mod (I) to A pt to return to PLOF,activity tolerance;45mins/45mins,inc balance 1/2 grade to dec fall risk,(I) with BLE ther ex HEP in various positions to A pt to inc balance,mobility,endurance and to A to dec pain,BM and transfers completely (I) to and from various surfaces consistently to A pt to return to PLOF,up and down 5 steps with use of rail S level of A to navigate steps at home environment   Treatment Day 0   Plan   Treatment/Interventions Functional transfer training;LE strengthening/ROM; Elevations; Therapeutic exercise; Endurance training;Patient/family training;Bed mobility;Gait training;Spoke to nursing   PT Frequency Other (Comment)  (3-5x/week;restorative therapy aide for mobility)   Recommendation   Recommendation Home with family support   Barthel Index   Feeding 10   Bathing 5 Grooming Score 5   Dressing Score 10   Bladder Score 10   Bowels Score 10   Toilet Use Score 10   Transfers (Bed/Chair) Score 15   Mobility (Level Surface) Score 10   Stairs Score 0   Barthel Index Score 85         @Nelly Childs, PT, DPT@

## 2018-12-16 NOTE — ED PROVIDER NOTES
History  Chief Complaint   Patient presents with    Abdominal Pain     pt still with abd pain  pt d/c'd this past thursday with same complaint  pt had gallbladder removed on 12/5/18     This is a 52-year-old male with a history of CAD, hypertension, diabetes, cholecystitis status post cholecystectomy on December 4th who presents with right upper quadrant abdominal pain  The patient states that he woke up this morning with a throbbing right upper quadrant abdominal pain  States that the pain has been constant, nonradiating, associated with 4 episodes of nonbloody, nonbilious vomiting  He attempted to take an oxycodone but states that he vomited 10 minutes following administration  Denies any other alleviating or exacerbating factors  States that he only presented later tonight because he needed to take care of his mother until his sister came home to relieve him of his duty  States that he spoke with Dr Alden Valentin, from General surgery, who told to come to the emergency department for evaluation  States that his symptoms are slightly different than when he presented to the emergency department 1 week ago  At that time, he had labs drawn and a CT scan of his abdomen pelvis which were unremarkable  He was evaluated by surgery at that time as well who stated he was stable for discharge and follow up as an outpatient  Denies any alcohol abuse  He did have a bowel movement last night which was described as normal   Denies fever/chills, lightheadedness/dizziness, numbness/weakness, headache, change in vision, URI symptoms, neck pain, chest pain, palpitations, shortness of breath, cough, back pain, flank pain, diarrhea, hematochezia, melena, dysuria, hematuria  Prior to Admission Medications   Prescriptions Last Dose Informant Patient Reported?  Taking?   acetaminophen (TYLENOL) 325 mg tablet   No No   Sig: Take 3 tablets (975 mg total) by mouth every 8 (eight) hours as needed for mild pain   albuterol (PROVENTIL HFA,VENTOLIN HFA) 90 mcg/act inhaler   Yes No   Sig: Inhale 1 puff as needed for wheezing   amLODIPine (NORVASC) 10 mg tablet   Yes No   Sig: Take 10 mg by mouth daily   apixaban (ELIQUIS) 5 mg   Yes No   Sig: Take 5 mg by mouth 2 (two) times a day   aspirin (ECOTRIN LOW STRENGTH) 81 mg EC tablet   Yes No   Sig: Take 81 mg by mouth daily   atorvastatin (LIPITOR) 80 mg tablet   Yes No   Sig: Take 80 mg by mouth daily at bedtime     carvedilol (COREG) 25 mg tablet   Yes No   Sig: Take 50 mg by mouth 2 (two) times a day with meals     glyBURIDE (DIABETA) 5 mg tablet   Yes No   Sig: Take 5 mg by mouth daily with breakfast   isosorbide mononitrate (IMDUR) 60 mg 24 hr tablet   Yes No   Sig: Take 60 mg by mouth daily   lisinopril (ZESTRIL) 40 mg tablet   Yes No   Sig: Take 40 mg by mouth daily     metFORMIN (GLUCOPHAGE) 1000 MG tablet   Yes No   Sig: Take 1,000 mg by mouth 2 (two) times a day with meals   mirtazapine (REMERON) 15 mg tablet   Yes No   Sig: Take 15 mg by mouth daily at bedtime   omeprazole (PriLOSEC) 20 mg delayed release capsule   Yes No   Sig: Take 20 mg by mouth daily   oxyCODONE (ROXICODONE) 5 mg immediate release tablet   No No   Sig: Take 1-2 tablets (5-10 mg total) by mouth every 4 (four) hours as needed for moderate pain or severe pain for up to 10 days Max Daily Amount: 60 mg   spironolactone (ALDACTONE) 50 mg tablet   Yes No   Sig: Take 50 mg by mouth daily        Facility-Administered Medications: None       Past Medical History:   Diagnosis Date    CAD (coronary artery disease)     Diabetes mellitus ([facility] [address] )     Hypertension        Past Surgical History:   Procedure Laterality Date    CHOLECYSTECTOMY LAPAROSCOPIC N/A 12/4/2018    Procedure: CHOLECYSTECTOMY LAPAROSCOPIC;  Surgeon: Shirley Jorge MD;  Location: [facility];  Service: General    HERNIA REPAIR         Family History   Problem Relation Age of Onset    Hypertension Father      I have reviewed and agree with the history as documented  Social History   Substance Use Topics    Smoking status: Never Smoker    Smokeless tobacco: Never Used    Alcohol use Yes      Comment: social        Review of Systems   Constitutional: Negative for chills, fatigue and fever  HENT: Negative for rhinorrhea, sore throat and trouble swallowing  Eyes: Negative for photophobia and visual disturbance  Respiratory: Negative for cough, chest tightness and shortness of breath  Cardiovascular: Negative for chest pain, palpitations and leg swelling  Gastrointestinal: Positive for abdominal pain, nausea and vomiting  Negative for blood in stool and diarrhea  Endocrine: Negative for polyuria  Genitourinary: Negative for dysuria, flank pain and hematuria  Musculoskeletal: Negative for back pain and neck pain  Skin: Negative for color change and rash  Allergic/Immunologic: Negative for immunocompromised state  Neurological: Negative for dizziness, weakness, light-headedness, numbness and headaches  All other systems reviewed and are negative  Physical Exam  ED Triage Vitals [12/15/18 1835]   Temperature Pulse Respirations Blood Pressure SpO2   98 °F (36 7 °C) 88 20 (!) 160/107 99 %      Temp Source Heart Rate Source Patient Position - Orthostatic VS BP Location FiO2 (%)   Oral Monitor Sitting Right arm --      Pain Score       Worst Possible Pain           Orthostatic Vital Signs  Vitals:    12/16/18 1919 12/16/18 2118 12/16/18 2320 12/17/18 0803   BP: (!) 172/100 170/92 140/80 (!) 170/104   Pulse:   80 78   Patient Position - Orthostatic VS: Lying Lying Lying Lying       Physical Exam   Constitutional: Vital signs are normal  He appears well-developed and well-nourished  He is cooperative  No distress  HENT:   Mouth/Throat: Uvula is midline and oropharynx is clear and moist    Eyes: Pupils are equal, round, and reactive to light   Conjunctivae, EOM and lids are normal    Neck: Trachea normal  No thyroid mass and no thyromegaly present  Cardiovascular: Normal rate, regular rhythm, normal heart sounds, intact distal pulses and normal pulses  No murmur heard  Pulmonary/Chest: Effort normal and breath sounds normal    Abdominal: Soft  Normal appearance and bowel sounds are normal  There is tenderness in the epigastric area, left upper quadrant and left lower quadrant  There is no rebound, no guarding, no CVA tenderness and negative Jimenez's sign  Neurological: He is alert  Skin: Skin is warm, dry and intact  Psychiatric: He has a normal mood and affect   His speech is normal and behavior is normal  Thought content normal        ED Medications  Medications   ondansetron (ZOFRAN) injection 4 mg (not administered)   heparin (porcine) subcutaneous injection 5,000 Units (5,000 Units Subcutaneous Given 12/17/18 0525)   pantoprazole (PROTONIX) injection 40 mg (40 mg Intravenous Given 12/17/18 0803)   labetalol (NORMODYNE) injection 20 mg (20 mg Intravenous Given 12/16/18 1632)   hydrALAZINE (APRESOLINE) injection 10 mg (10 mg Intravenous Given 12/16/18 1920)   insulin lispro (HumaLOG) 100 units/mL subcutaneous injection 2-12 Units (2 Units Subcutaneous Not Given 12/17/18 0602)   promethazine (PHENERGAN) injection 25 mg (not administered)   HYDROmorphone (DILAUDID) injection 0 5 mg (0 5 mg Intravenous Given 12/17/18 0803)   lactated ringers infusion (125 mL/hr Intravenous New Bag 12/17/18 0740)   ondansetron (ZOFRAN) injection 4 mg (4 mg Intravenous Given 12/15/18 1907)   ketorolac (TORADOL) injection 15 mg (15 mg Intravenous Given 12/15/18 1908)   sucralfate (CARAFATE) oral suspension 1,000 mg (1,000 mg Oral Given 12/15/18 1908)   famotidine (PEPCID) tablet 20 mg (20 mg Oral Given 12/15/18 1908)   iohexol (OMNIPAQUE) 350 MG/ML injection (MULTI-DOSE) 100 mL (100 mL Intravenous Given 12/15/18 2050)   HYDROmorphone (DILAUDID) injection 0 5 mg (0 5 mg Intravenous Given 12/15/18 2205)   multi-electrolyte (ISOLYTE-S PH 7 4) bolus 1,000 mL (0 mL Intravenous Stopped 12/16/18 1931)   lactated ringers bolus 1,000 mL (0 mL Intravenous Stopped 12/16/18 1931)   gadobutrol injection (MULTI-DOSE) SOLN 12 mL (12 mL Intravenous Given 12/16/18 1540)       Diagnostic Studies  Results Reviewed     Procedure Component Value Units Date/Time    Platelet count [[id number]] Collected:  12/17/18 0710    Lab Status:  No result Specimen:  Blood from Arm, Right     Magnesium [[id number]]  (Normal) Collected:  12/16/18 0453    Lab Status:  Final result Specimen:  Blood from Arm, Right Updated:  12/16/18 0546     Magnesium 2 4 mg/dL     Comprehensive metabolic panel [[id number]]  (Abnormal) Collected:  12/16/18 0453    Lab Status:  Final result Specimen:  Blood from Arm, Right Updated:  12/16/18 0546     Sodium 135 (L) mmol/L      Potassium 3 7 mmol/L      Chloride 101 mmol/L      CO2 26 mmol/L      ANION GAP 8 mmol/L      BUN 17 mg/dL      Creatinine 1 38 (H) mg/dL      Glucose 118 mg/dL      Calcium 8 4 mg/dL       (H) U/L       (H) U/L      Alkaline Phosphatase 558 (H) U/L      Total Protein 6 8 g/dL      Albumin 3 4 (L) g/dL      Total Bilirubin 1 01 (H) mg/dL      eGFR 57 ml/min/1 73sq m     Narrative:         National Kidney Disease Education Program recommendations are as follows:  GFR calculation is accurate only with a steady state creatinine  Chronic Kidney disease less than 60 ml/min/1 73 sq  meters  Kidney failure less than 15 ml/min/1 73 sq  meters      CBC [[id number]]  (Abnormal) Collected:  12/16/18 0453    Lab Status:  Final result Specimen:  Blood from Arm, Right Updated:  12/16/18 0516     WBC 12 18 (H) Thousand/uL      RBC 5 26 Million/uL      Hemoglobin 15 8 g/dL      Hematocrit 45 5 %      MCV 87 fL      MCH 30 0 pg      MCHC 34 7 g/dL      RDW 11 8 %      Platelets 393 Thousands/uL      MPV 9 2 fL     Comprehensive metabolic panel [[id number]]  (Abnormal) Collected:  12/15/18 1938    Lab Status:  Final result Specimen:  Blood from Hand, Left Updated: 12/15/18 2028     Sodium 130 (L) mmol/L      Potassium 5 6 (H) mmol/L      Chloride 99 (L) mmol/L      CO2 26 mmol/L      ANION GAP 5 mmol/L      BUN 19 mg/dL      Creatinine 1 44 (H) mg/dL      Glucose 240 (H) mg/dL      Calcium 9 6 mg/dL       (H) U/L       (H) U/L      Alkaline Phosphatase 574 (H) U/L      Total Protein 7 3 g/dL      Albumin 3 5 g/dL      Total Bilirubin 3 44 (H) mg/dL      eGFR 54 ml/min/1 73sq m     Narrative:         National Kidney Disease Education Program recommendations are as follows:  GFR calculation is accurate only with a steady state creatinine  Chronic Kidney disease less than 60 ml/min/1 73 sq  meters  Kidney failure less than 15 ml/min/1 73 sq  meters  Lipase [[id number]]  (Abnormal) Collected:  12/15/18 1938    Lab Status:  Final result Specimen:  Blood from Hand, Left Updated:  12/15/18 2028     Lipase 13,402 (H) u/L     CBC and differential [[id number]]  (Abnormal) Collected:  12/15/18 1903    Lab Status:  Final result Specimen:  Blood from Hand, Left Updated:  12/15/18 1911     WBC 8 30 Thousand/uL      RBC 5 57 Million/uL      Hemoglobin 16 8 g/dL      Hematocrit 48 0 %      MCV 86 fL      MCH 30 2 pg      MCHC 35 0 g/dL      RDW 11 7 %      MPV 9 3 fL      Platelets 427 Thousands/uL      nRBC 0 /100 WBCs      Neutrophils Relative 67 %      Immat GRANS % 0 %      Lymphocytes Relative 19 %      Monocytes Relative 5 %      Eosinophils Relative 8 (H) %      Basophils Relative 1 %      Neutrophils Absolute 5 57 Thousands/µL      Immature Grans Absolute 0 01 Thousand/uL      Lymphocytes Absolute 1 55 Thousands/µL      Monocytes Absolute 0 45 Thousand/µL      Eosinophils Absolute 0 68 (H) Thousand/µL      Basophils Absolute 0 04 Thousands/µL                  CT abdomen pelvis with contrast   Final Result by Bakari Bryson MD (12/15 2140)      1  Findings consistent with acute pancreatitis  2   Status post cholecystectomy    No organized or drainable fluid collection in the surgical bed  3   Stable nonobstructing bilateral renal calculi  The study was marked in [facility] for immediate notification  Workstation performed: [id number]         MRI abdomen w wo contrast and mrcp    (Results Pending)         Procedures  Procedures      Phone Consults  ED Phone Contact    ED Course  ED Course as of Dec 17 0941   Sat Dec 15, 2018   1917 WBC: 8 30   1917 Hemoglobin: 16 8   1917 Platelet Count: 572                               MDM  Number of Diagnoses or Management Options  Diagnosis management comments: Labs, CT scan abdomen pelvis  Toradol for pain  Zofran for nausea  Carafate and Pepcid for epigastric pain  Disposition pending results  Consult surgery  CritCare Time    Disposition  Final diagnoses:   RUQ abdominal pain   Nausea and vomiting   Pancreatitis     Time reflects when diagnosis was documented in both MDM as applicable and the Disposition within this note     Time User Action Codes Description Comment    12/15/2018  7:27 PM Calderon Franco Add [R10 11] RUQ abdominal pain     12/15/2018  7:27 PM Peggy CAERY Add [R11 2] Nausea and vomiting     12/15/2018  9:55 PM Anderson Stevens [K85 90] Pancreatitis     12/15/2018 10:51 PM Edwin Kato, Charm Lefort Add [K85 90] Acute pancreatitis, unspecified complication status, unspecified pancreatitis type     12/15/2018 10:51 PM Faraz Wild Modify [K85 90] Acute pancreatitis, unspecified complication status, unspecified pancreatitis type     12/16/2018 12:01 PM Amanda Russell Add [K81 9] Cholecystitis       ED Disposition     ED Disposition Condition Comment    Admit  Case was discussed with Gen Surgery and the patient's admission status was agreed to be Admission Status: inpatient status to the service of Dr America Richardson             Follow-up Information    None         Current Discharge Medication List      CONTINUE these medications which have NOT CHANGED    Details   acetaminophen (TYLENOL) 325 mg tablet Take 3 tablets (975 mg total) by mouth every 8 (eight) hours as needed for mild pain  Qty: 30 tablet, Refills: 0    Associated Diagnoses: Cholecystitis      albuterol (PROVENTIL HFA,VENTOLIN HFA) 90 mcg/act inhaler Inhale 1 puff as needed for wheezing      amLODIPine (NORVASC) 10 mg tablet Take 10 mg by mouth daily      apixaban (ELIQUIS) 5 mg Take 5 mg by mouth 2 (two) times a day      aspirin (ECOTRIN LOW STRENGTH) 81 mg EC tablet Take 81 mg by mouth daily      atorvastatin (LIPITOR) 80 mg tablet Take 80 mg by mouth daily at bedtime        carvedilol (COREG) 25 mg tablet Take 50 mg by mouth 2 (two) times a day with meals        glyBURIDE (DIABETA) 5 mg tablet Take 5 mg by mouth daily with breakfast      isosorbide mononitrate (IMDUR) 60 mg 24 hr tablet Take 60 mg by mouth daily      lisinopril (ZESTRIL) 40 mg tablet Take 40 mg by mouth daily        metFORMIN (GLUCOPHAGE) 1000 MG tablet Take 1,000 mg by mouth 2 (two) times a day with meals      mirtazapine (REMERON) 15 mg tablet Take 15 mg by mouth daily at bedtime      omeprazole (PriLOSEC) 20 mg delayed release capsule Take 20 mg by mouth daily      oxyCODONE (ROXICODONE) 5 mg immediate release tablet Take 1-2 tablets (5-10 mg total) by mouth every 4 (four) hours as needed for moderate pain or severe pain for up to 10 days Max Daily Amount: 60 mg  Qty: 20 tablet, Refills: 0    Comments: Continuing therapy  Associated Diagnoses: Cholecystitis      spironolactone (ALDACTONE) 50 mg tablet Take 50 mg by mouth daily             No discharge procedures on file  ED Provider  Attending physically available and evaluated Cadence Arandasanjay MCKEON managed the patient along with the ED Attending      Electronically Signed by         Delta Orosco MD  12/17/18 Rickey Matt 260, MD  12/20/18 3110

## 2018-12-16 NOTE — PLAN OF CARE
Problem: PHYSICAL THERAPY ADULT  Goal: Performs mobility at highest level of function for planned discharge setting  See evaluation for individualized goals  Treatment/Interventions: Functional transfer training, LE strengthening/ROM, Elevations, Therapeutic exercise, Endurance training, Patient/family training, Bed mobility, Gait training, Spoke to nursing          See flowsheet documentation for full assessment, interventions and recommendations  Prognosis: Good  Problem List: Decreased endurance, Impaired balance, Decreased mobility, Decreased safety awareness, Decreased skin integrity, Pain  Assessment: Pt is a 53 y/o male admitted to [facility] 2* acute pancreatitis,scheduled removal of gall stones and recent surgical procedure with readmission  Pt lives with mother and sister in 1 ,(+)[place],no use of DME PTA,reports no recent falls and 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  Pt is currently not at functional mobility baseline,needs Ax1 for mobility,ataxic and unsteady gait pattern,multiple lines,IV medicine management,inc pain and ongoing medical care  Pt demonstrates limited mobility and gait 2* dec endurance,dec balance,inc ABD pain,ataxic and unsteady gait pattern and needs S for BM,transfers and gait without use of DME  Pt would cont to benefit from skilled inpt PT services to maximize functional independence  Barriers to Discharge: Inaccessible home environment ([place],pt reports being caregiver for mother PTA)     Recommendation: Home with family support          See flowsheet documentation for full assessment

## 2018-12-16 NOTE — PROGRESS NOTES
Progress Note - General Surgery   Cadence Sullivan 52 y o  male MRN: [id number]  Unit/Bed#: [facility] 815-01 Encounter: [id number]    Assessment:  52 y o  male with history of CAD, HTN, T2DM, and pancreatitis s/p lap jhon 12/4 who presents with acute pancreatitis      Plan:  - NS @ 150  - NPO  - PRN pain and nausea control  - Replace electrolytes PRN  - F/U MRCP  - Possible consult to GI depending on MRCP result  - patient takes Eliquis for history of TIAs, currently being held in case need for GI procedure, we will plan to restart if no procedure is to be done       Subjective/Objective     Subjective:  Patient admitted overnight for acute pancreatitis  Patient endorses ongoing abdominal pain this morning no different from when he came in last night  Nausea control has improved, patient reports no emesis since admission to the hospital   Patient's lipase this morning was [address] 13,000 last night  Objective:   Blood pressure 170/90, pulse 81, temperature 98 7 °F (37 1 °C), temperature source Oral, resp  rate 20, height 6' 2" (1 88 m), weight 118 kg (260 lb 1 oz), SpO2 93 %  ,Body mass index is 33 39 kg/m²      No intake or output data in the 24 hours ending 12/16/18 0941    Invasive Devices     Peripheral Intravenous Line            Peripheral IV 12/15/18 Left Hand less than 1 day                Physical Exam:   Gen: NAD, A&O, Comfortable   Chest: Normal work of breathing, no resp distress  Abd: S, ND, tender along mid abdomen  Ext: No edema  Skin: warm, dry, intact      Lab, Imaging and other studies:  CBC:   Lab Results   Component Value Date    WBC 12 18 (H) 12/16/2018    HGB 15 8 12/16/2018    HCT 45 5 12/16/2018    MCV 87 12/16/2018     12/16/2018    MCH 30 0 12/16/2018    MCHC 34 7 12/16/2018    RDW 11 8 12/16/2018    MPV 9 2 12/16/2018    NRBC 0 12/15/2018   , CMP:   Lab Results   Component Value Date    SODIUM 135 (L) 12/16/2018    K 3 7 12/16/2018     12/16/2018    CO2 26 12/16/2018    BUN 17 12/16/2018    CREATININE 1 38 (H) 12/16/2018    CALCIUM 8 4 12/16/2018     (H) 12/16/2018     (H) 12/16/2018    ALKPHOS 558 (H) 12/16/2018    EGFR 57 12/16/2018   , Coagulation: No results found for: PT, INR, APTT, Urinalysis: No results found for: Tedra Carmen, SPECGRAV, PHUR, LEUKOCYTESUR, NITRITE, PROTEINUA, GLUCOSEU, KETONESU, BILIRUBINUR, BLOODU, Amylase: No results found for: AMYLASE, Lipase:   Lab Results   Component Value Date    LIPASE 5,626 (H) 12/16/2018     VTE Pharmacologic Prophylaxis: Heparin  VTE Mechanical Prophylaxis: sequential compression device

## 2018-12-16 NOTE — UTILIZATION REVIEW
Initial Clinical Review    Admission: Date/Time/Statement: 12/15/18 @ 2250     Orders Placed This Encounter   Procedures    Inpatient Admission     Standing Status:   Standing     Number of Occurrences:   1     Order Specific Question:   Admitting Physician     Answer:   Bhupendra Perez     Order Specific Question:   Level of Care     Answer:   Med Surg [16]     Order Specific Question:   Estimated length of stay     Answer:   More than 2 Midnights     Order Specific Question:   Certification     Answer:   I certify that inpatient services are medically necessary for this patient for a duration of greater than two midnights  See H&P and MD Progress Notes for additional information about the patient's course of treatment  ED: Date/Time/Mode of Arrival:   ED Arrival Information     Expected Arrival Acuity Means of Arrival Escorted By Service Admission Type    - 12/15/2018 18:26 Urgent Ambulance [facility] Surgery-General Urgent    Arrival Complaint    abd pain          Chief Complaint:   Chief Complaint   Patient presents with    Abdominal Pain     pt still with abd pain  pt d/c'd this past thursday with same complaint  pt had gallbladder removed on 12/5/18       History of Illness: 52 y o  male with history of CAD, HTN, T2DM, and pancreatitis s/p lap jhon 12/4 who presents with acute pancreatitis  Patient had a recent bout of acute pancreatitis believed to be due to gallstones, therefore patient received a laparoscopic cholecystectomy  Upon discharge patient was doing well until he had a similar abdominal pain on 12/12  He was seen in the ED where labs and CT abdomen pelvis were unremarkable, therefore he was sent home  Patient returned this evening again with severe abdominal pain, nausea, emesis x4, and subjective fevers/chills      ED Vital Signs:   ED Triage Vitals [12/15/18 1835]   Temperature Pulse Respirations Blood Pressure SpO2   98 °F (36 7 °C) 88 20 (!) 160/107 99 %      Temp Source Heart Rate Source Patient Position - Orthostatic VS BP Location FiO2 (%)   Oral Monitor Sitting Right arm --      Pain Score       Worst Possible Pain        Wt Readings from Last 1 Encounters:   12/16/18 118 kg (260 lb 1 oz)       Vital Signs (abnormal): //107    Abnormal Labs/Diagnostic Test Results:    Ref Range & Units 12/15/18 1938   Sodium 136 - 145 mmol/L 130     Potassium 3 5 - 5 3 mmol/L 5 6     Chloride 100 - 108 mmol/L 99     CO2 21 - 32 mmol/L 26    ANION GAP 4 - 13 mmol/L 5    BUN 5 - 25 mg/dL 19    Creatinine 0 60 - 1 30 mg/dL 1 44     Glucose 65 - 140 mg/dL 240     Calcium 8 3 - 10 1 mg/dL 9 6    AST 5 - 45 U/L 397     ALT 12 - 78 U/L 406     Alkaline Phosphatase 46 - 116 U/L 574     Total Protein 6 4 - 8 2 g/dL 7 3    Albumin 3 5 - 5 0 g/dL 3 5    Total Bilirubin 0 20 - 1 00 mg/dL 3 44        Ref Range & Units 12/15/18 1938   Lipase 73 - 393 u/L 13,402         CT a/p 12/15 -- 1  Findings consistent with acute pancreatitis  2   Status post cholecystectomy  No organized or drainable fluid collection in the surgical bed  3   Stable nonobstructing bilateral renal calculi        ED Treatment:   Medication Administration from 12/15/2018 1826 to 12/15/2018 2326       Date/Time Order Dose Route Action     12/15/2018 1907 ondansetron (ZOFRAN) injection 4 mg 4 mg Intravenous Given     12/15/2018 1908 ketorolac (TORADOL) injection 15 mg 15 mg Intravenous Given     12/15/2018 1908 sucralfate (CARAFATE) oral suspension 1,000 mg 1,000 mg Oral Given     12/15/2018 1908 famotidine (PEPCID) tablet 20 mg 20 mg Oral Given     12/15/2018 2050 iohexol (OMNIPAQUE) 350 MG/ML injection (MULTI-DOSE) 100 mL 100 mL Intravenous Given     12/15/2018 2205 HYDROmorphone (DILAUDID) injection 0 5 mg 0 5 mg Intravenous Given     12/15/2018 2205 multi-electrolyte (ISOLYTE-S PH 7 4) bolus 1,000 mL 1,000 mL Intravenous New Bag       Past Medical/Surgical History:    Active Ambulatory Problems     Diagnosis Date Noted    Acute pancreatitis 11/15/2018    Cholecystitis 11/26/2018     Past Medical History:   Diagnosis Date    CAD (coronary artery disease)     Diabetes mellitus ([facility] [address] )     Hypertension        Admitting Diagnosis: Pancreatitis [K85 90]  Abdominal pain [R10 9]  Nausea and vomiting [R11 2]  RUQ abdominal pain [R10 11]  Acute pancreatitis, unspecified complication status, unspecified pancreatitis type [K85 90]    Age/Sex: 52 y o  male    Assessment/Plan:   Assessment:  52 y o  male with history of CAD, HTN, T2DM, and pancreatitis s/p lap jhon 12/4 who presents with acute pancreatitis      Plan:  - admit to red surgery  - Isolyte @ 125  - NPO  - PRN pain and nausea control  - Replace electrolytes PRN  - GI consult  - MRCP    Admission Orders:  Scheduled Meds:   heparin (porcine) 5,000 Units Subcutaneous Q8H [facility]   hydrALAZINE 10 mg Intravenous Q6H PRN   HYDROmorphone 0 5 mg Intravenous Q3H PRN 12/15 x1, 12/16 x4   insulin lispro 2-12 Units Subcutaneous Q6H YOLIE   labetalol 20 mg Intravenous Q6H PRN   ondansetron 4 mg Intravenous Q6H PRN   pantoprazole 40 mg Intravenous Q24H YOLIE   promethazine 25 mg Intramuscular Q6H PRN   sodium chloride 150 mL/hr Intravenous Continuous     Npo  Consult acute care surgery  Monitor labs  SCD's  Up with assist  IS q 1h  Encourage deep breathing and coughing  Daily wts  I/O  Fingerstick glucose checks ac & hs  PT/OT evals

## 2018-12-16 NOTE — CONSULTS
History: 47-year-old man underwent  Laparoscopic cholecystectomy on December 4, 2018 for recurrent upper abdominal pain and history of cholecystitis  He is now readmitted with  And acute pancreatitis and bilirubin 3 44  Prior to cholecystectomy  a CT scan of the abdomen and an abdominal ultrasound showed gallstones without evidence of choledocholithiasis  His liver function tests were normal      Abdominal pain recurred 3 days ago, severe upper abdominal pain radiating to back  Yesterday he vomited several times  Complains of pruritus and dark urine  Yesterday bilirubin was 3 4, lipase 13,000, transaminases in the 400 range  Creatinine 1 44, decreased to 1 38 today  Past medical history:    Coronary artery disease  Diabetes mellitus  Hypertension  History of hernia repair  review of the systems:  General:  Denies chills, denies fever, denies chronic weight loss, denies chronic abdominal pain  Cardiovascular: Denies chest pains,  palpitations  Pulmonary: Denies shortness of breath,  denies cough, denies sputum  Gastrointestinal: As per history of present illness  : Denies dysuria, denies hematuria, denies flank pain  Musculoskeletal:  Denies arthralgias, denies ambulatory dysfunction, denies back pain  Neurological:  Denies speech or visual disturbance  Denies focal weakness  Allergies:  No known drug allergies  Outpatient medications:  Reviewed  No chronic anticoagulation, no anti-platelet agents  Physical examination:  /90, temperature 98 7°, heart rate 81  BMI 33 4, weight 118 kg  Awake and comfortable  Skin: Warm, no jaundice, no spider angiomas  Head and neck:  Normal oral mucosa, no jugular venous distention, normal neck without mass  Lungs: Clear, no wheezing  Heart: Regular, no murmur  Abdomen:  Soft, upper abdominal tenderness on palpation without guarding or rebound  Normal bowel sounds, no ascites  Extremities: Warm, no edema, no calf tenderness, good peripheral pulses  Neurological: Oriented x3, grossly no focal abnormalities, speech intact  Impressions/recommendations:  1  Acute pancreatitis  Elevated bilirubin and transaminases  Likely retained common bile duct stone  Awaiting MRI with MRCP, if choledocholithiasis confirmed the patient will be scheduled for an ERCP tomorrow  Continue intravenous hydration,  continue with supportive therapy

## 2018-12-16 NOTE — SOCIAL WORK
Met with patient and explained CM role and CM program  Resides with sister and mother in a house, [address], bed/bathroom 1st floor  Independent prior to admission for ADL's and ambulation  PCP Dr Enio Khan  Has a prescription plan, uses [facility]  He drives  DME/HHC/IP Rehab-denies utilization  Denies mental health illness, drug and/or alcohol abuse  Primary contact sister Nando Mayer, [phone]  No POA  Sister will provide transport home    CM reviewed d/c planning process including the following: identifying help at home, patient preference for d/c planning needs, Discharge Lounge, [facility] Meds to Bed program, availability of treatment team to discuss questions or concerns patient and/or family may have regarding understanding medications and recognizing signs and symptoms once discharged  CM also encouraged patient to follow up with all recommended appointments after discharge  Patient advised of importance for patient and family to participate in managing patients medical well being  Patient/caregiver received discharge checklist  Content reviewed  Patient/caregiver encouraged to participate in discharge plan of care prior to discharge home

## 2018-12-16 NOTE — H&P
Consultation - General Surgery   Ely Jang 52 y o  male MRN: [id number]  Unit/Bed#: ED 18 Encounter: [id number]    Assessment/Plan   Assessment:  52 y o  male with history of CAD, HTN, T2DM, and pancreatitis s/p lap jhon 12/4 who presents with acute pancreatitis  Plan:  - admit to red surgery  - Isolyte @ 125  - NPO  - PRN pain and nausea control  - Replace electrolytes PRN  - GI consult  - MRCP      History of Present Illness   HPI:  Ely Jang is a 52 y o  male with history of CAD, HTN, T2DM, and pancreatitis s/p lap jhon 12/4 who presents with acute pancreatitis  Patient had a recent bout of acute pancreatitis believed to be due to gallstones, therefore patient received a laparoscopic cholecystectomy  Upon discharge patient was doing well until he had a similar abdominal pain on 12/12  He was seen in the ED where labs and CT abdomen pelvis were unremarkable, therefore he was sent home  Patient returned this evening again with severe abdominal pain, nausea, emesis x4, and subjective fevers/chills  Labs were significant for lipase greater than 13,000, hyperbilirubinemia (T bili 3 44), and transaminitis  Normal WBC  CT abdomen and pelvis revealed acute pancreatitis  Patient denies diarrhea, constipation, chest pain, or shortness of breath  Consults    Review of Systems  10/14 systems reviewed and negative except those mentioned in the HPI      Historical Information   Past Medical History:   Diagnosis Date    CAD (coronary artery disease)     Diabetes mellitus ([facility] [address] )     Hypertension      Past Surgical History:   Procedure Laterality Date    CHOLECYSTECTOMY LAPAROSCOPIC N/A 12/4/2018    Procedure: CHOLECYSTECTOMY LAPAROSCOPIC;  Surgeon: Penelope Loza MD;  Location: [facility];  Service: General    HERNIA REPAIR       Social History   History   Alcohol Use    Yes     Comment: social     History   Drug Use No     History   Smoking Status    Never Smoker   Smokeless Tobacco    Never Used     Family History: non-contributory    Meds/Allergies   current meds:   Current Facility-Administered Medications   Medication Dose Route Frequency    HYDROmorphone (DILAUDID) injection 0 5 mg  0 5 mg Intravenous Once    multi-electrolyte (ISOLYTE-S PH 7 4) bolus 1,000 mL  1,000 mL Intravenous Once    and PTA meds:   Prior to Admission Medications   Prescriptions Last Dose Informant Patient Reported?  Taking?   acetaminophen (TYLENOL) 325 mg tablet   No No   Sig: Take 3 tablets (975 mg total) by mouth every 8 (eight) hours as needed for mild pain   albuterol (PROVENTIL HFA,VENTOLIN HFA) 90 mcg/act inhaler   Yes No   Sig: Inhale 1 puff as needed for wheezing   amLODIPine (NORVASC) 10 mg tablet   Yes No   Sig: Take 10 mg by mouth daily   apixaban (ELIQUIS) 5 mg   Yes No   Sig: Take 5 mg by mouth 2 (two) times a day   aspirin (ECOTRIN LOW STRENGTH) 81 mg EC tablet   Yes No   Sig: Take 81 mg by mouth daily   atorvastatin (LIPITOR) 80 mg tablet   Yes No   Sig: Take 80 mg by mouth daily at bedtime     carvedilol (COREG) 25 mg tablet   Yes No   Sig: Take 50 mg by mouth 2 (two) times a day with meals     glyBURIDE (DIABETA) 5 mg tablet   Yes No   Sig: Take 5 mg by mouth daily with breakfast   isosorbide mononitrate (IMDUR) 60 mg 24 hr tablet   Yes No   Sig: Take 60 mg by mouth daily   lisinopril (ZESTRIL) 40 mg tablet   Yes No   Sig: Take 40 mg by mouth daily     metFORMIN (GLUCOPHAGE) 1000 MG tablet   Yes No   Sig: Take 1,000 mg by mouth 2 (two) times a day with meals   mirtazapine (REMERON) 15 mg tablet   Yes No   Sig: Take 15 mg by mouth daily at bedtime   omeprazole (PriLOSEC) 20 mg delayed release capsule   Yes No   Sig: Take 20 mg by mouth daily   oxyCODONE (ROXICODONE) 5 mg immediate release tablet   No No   Sig: Take 1-2 tablets (5-10 mg total) by mouth every 4 (four) hours as needed for moderate pain or severe pain for up to 10 days Max Daily Amount: 60 mg   spironolactone (ALDACTONE) 50 mg tablet Yes No   Sig: Take 50 mg by mouth daily        Facility-Administered Medications: None     No Known Allergies    Objective   First Vitals:   Blood Pressure: (!) 160/107 (12/15/18 1835)  Pulse: 88 (12/15/18 1835)  Temperature: 98 °F (36 7 °C) (12/15/18 1835)  Temp Source: Oral (12/15/18 1835)  Respirations: 20 (12/15/18 1835)  Weight - Scale: 118 kg (260 lb 2 3 oz) (12/15/18 1835)  SpO2: 99 % (12/15/18 1835)    Current Vitals:   Blood Pressure: 158/97 (12/15/18 2045)  Pulse: 62 (12/15/18 2045)  Temperature: 98 °F (36 7 °C) (12/15/18 1835)  Temp Source: Oral (12/15/18 1835)  Respirations: 20 (12/15/18 2045)  Weight - Scale: 118 kg (260 lb 2 3 oz) (12/15/18 1835)  SpO2: 96 % (12/15/18 2045)    No intake or output data in the 24 hours ending 12/15/18 2159    Invasive Devices     Peripheral Intravenous Line            Peripheral IV 12/15/18 Left Hand less than 1 day                Physical Exam   Constitutional: He is oriented to person, place, and time  He appears well-developed and well-nourished  HENT:   Head: Normocephalic and atraumatic  Nose: Nose normal    Mouth/Throat: Oropharynx is clear and moist    Eyes: Pupils are equal, round, and reactive to light  Conjunctivae and EOM are normal    Neck: Normal range of motion  Neck supple  No JVD present  No tracheal deviation present  Cardiovascular: Normal rate, regular rhythm and intact distal pulses  Pulmonary/Chest: Effort normal and breath sounds normal  No respiratory distress  Abdominal: Soft  He exhibits no distension  There is tenderness  There is no rebound and no guarding  Tenderness across the mid abdomen, no rebound or guarding   Musculoskeletal: He exhibits no edema, tenderness or deformity  Neurological: He is alert and oriented to person, place, and time  Skin: Skin is warm and dry  Psychiatric: He has a normal mood and affect   His behavior is normal  Judgment and thought content normal        Lab Results:   CBC:   Lab Results Component Value Date    WBC 8 30 12/15/2018    HGB 16 8 12/15/2018    HCT 48 0 12/15/2018    MCV 86 12/15/2018     12/15/2018    MCH 30 2 12/15/2018    MCHC 35 0 12/15/2018    RDW 11 7 12/15/2018    MPV 9 3 12/15/2018    NRBC 0 12/15/2018   , CMP:   Lab Results   Component Value Date    SODIUM 130 (L) 12/15/2018    K 5 6 (H) 12/15/2018    CL 99 (L) 12/15/2018    CO2 26 12/15/2018    BUN 19 12/15/2018    CREATININE 1 44 (H) 12/15/2018    CALCIUM 9 6 12/15/2018     (H) 12/15/2018     (H) 12/15/2018    ALKPHOS 574 (H) 12/15/2018    EGFR 54 12/15/2018   , Coagulation: No results found for: PT, INR, APTT, Urinalysis: No results found for: Anais Porteous, SPECGRAV, PHUR, LEUKOCYTESUR, NITRITE, PROTEINUA, GLUCOSEU, KETONESU, BILIRUBINUR, BLOODU, Amylase: No results found for: AMYLASE, Lipase:   Lab Results   Component Value Date    LIPASE 13,402 (H) 12/15/2018     Imagin/15 CT A/P:  Procedure: Ct Abdomen Pelvis With Contrast  Result Date: 12/15/2018  Impression: 1  Findings consistent with acute pancreatitis  2   Status post cholecystectomy  No organized or drainable fluid collection in the surgical bed  3   Stable nonobstructing bilateral renal calculi

## 2018-12-17 LAB
ALBUMIN SERPL BCP-MCNC: 3 G/DL (ref 3.5–5)
ALP SERPL-CCNC: 414 U/L (ref 46–116)
ALT SERPL W P-5'-P-CCNC: 192 U/L (ref 12–78)
ANION GAP SERPL CALCULATED.3IONS-SCNC: 8 MMOL/L (ref 4–13)
AST SERPL W P-5'-P-CCNC: 63 U/L (ref 5–45)
BASOPHILS # BLD AUTO: 0.03 THOUSANDS/ΜL (ref 0–0.1)
BASOPHILS NFR BLD AUTO: 0 % (ref 0–1)
BILIRUB SERPL-MCNC: 1.03 MG/DL (ref 0.2–1)
BUN SERPL-MCNC: 11 MG/DL (ref 5–25)
CALCIUM SERPL-MCNC: 8.6 MG/DL (ref 8.3–10.1)
CHLORIDE SERPL-SCNC: 101 MMOL/L (ref 100–108)
CO2 SERPL-SCNC: 24 MMOL/L (ref 21–32)
CREAT SERPL-MCNC: 0.99 MG/DL (ref 0.6–1.3)
EOSINOPHIL # BLD AUTO: 0.34 THOUSAND/ΜL (ref 0–0.61)
EOSINOPHIL NFR BLD AUTO: 3 % (ref 0–6)
ERYTHROCYTE [DISTWIDTH] IN BLOOD BY AUTOMATED COUNT: 11.5 % (ref 11.6–15.1)
GFR SERPL CREATININE-BSD FRML MDRD: 85 ML/MIN/1.73SQ M
GLUCOSE SERPL-MCNC: 100 MG/DL (ref 65–140)
GLUCOSE SERPL-MCNC: 114 MG/DL (ref 65–140)
GLUCOSE SERPL-MCNC: 146 MG/DL (ref 65–140)
GLUCOSE SERPL-MCNC: 90 MG/DL (ref 65–140)
GLUCOSE SERPL-MCNC: 90 MG/DL (ref 65–140)
GLUCOSE SERPL-MCNC: 99 MG/DL (ref 65–140)
HCT VFR BLD AUTO: 42.6 % (ref 36.5–49.3)
HGB BLD-MCNC: 14.7 G/DL (ref 12–17)
IMM GRANULOCYTES # BLD AUTO: 0.02 THOUSAND/UL (ref 0–0.2)
IMM GRANULOCYTES NFR BLD AUTO: 0 % (ref 0–2)
LIPASE SERPL-CCNC: 795 U/L (ref 73–393)
LYMPHOCYTES # BLD AUTO: 2 THOUSANDS/ΜL (ref 0.6–4.47)
LYMPHOCYTES NFR BLD AUTO: 19 % (ref 14–44)
MCH RBC QN AUTO: 29.9 PG (ref 26.8–34.3)
MCHC RBC AUTO-ENTMCNC: 34.5 G/DL (ref 31.4–37.4)
MCV RBC AUTO: 87 FL (ref 82–98)
MONOCYTES # BLD AUTO: 0.95 THOUSAND/ΜL (ref 0.17–1.22)
MONOCYTES NFR BLD AUTO: 9 % (ref 4–12)
NEUTROPHILS # BLD AUTO: 7.12 THOUSANDS/ΜL (ref 1.85–7.62)
NEUTS SEG NFR BLD AUTO: 69 % (ref 43–75)
NRBC BLD AUTO-RTO: 0 /100 WBCS
PLATELET # BLD AUTO: 246 THOUSANDS/UL (ref 149–390)
PMV BLD AUTO: 9.1 FL (ref 8.9–12.7)
POTASSIUM SERPL-SCNC: 3.8 MMOL/L (ref 3.5–5.3)
PROT SERPL-MCNC: 6.9 G/DL (ref 6.4–8.2)
RBC # BLD AUTO: 4.92 MILLION/UL (ref 3.88–5.62)
SODIUM SERPL-SCNC: 133 MMOL/L (ref 136–145)
WBC # BLD AUTO: 10.46 THOUSAND/UL (ref 4.31–10.16)

## 2018-12-17 PROCEDURE — 82948 REAGENT STRIP/BLOOD GLUCOSE: CPT

## 2018-12-17 PROCEDURE — 80053 COMPREHEN METABOLIC PANEL: CPT | Performed by: SURGERY

## 2018-12-17 PROCEDURE — 85025 COMPLETE CBC W/AUTO DIFF WBC: CPT | Performed by: SURGERY

## 2018-12-17 PROCEDURE — 99024 POSTOP FOLLOW-UP VISIT: CPT | Performed by: SURGERY

## 2018-12-17 PROCEDURE — 83690 ASSAY OF LIPASE: CPT | Performed by: SURGERY

## 2018-12-17 PROCEDURE — C9113 INJ PANTOPRAZOLE SODIUM, VIA: HCPCS | Performed by: ORTHOPAEDIC SURGERY

## 2018-12-17 RX ORDER — SODIUM CHLORIDE, SODIUM GLUCONATE, SODIUM ACETATE, POTASSIUM CHLORIDE, MAGNESIUM CHLORIDE, SODIUM PHOSPHATE, DIBASIC, AND POTASSIUM PHOSPHATE .53; .5; .37; .037; .03; .012; .00082 G/100ML; G/100ML; G/100ML; G/100ML; G/100ML; G/100ML; G/100ML
125 INJECTION, SOLUTION INTRAVENOUS CONTINUOUS
Status: DISCONTINUED | OUTPATIENT
Start: 2018-12-17 | End: 2018-12-17

## 2018-12-17 RX ORDER — SODIUM CHLORIDE, SODIUM LACTATE, POTASSIUM CHLORIDE, CALCIUM CHLORIDE 600; 310; 30; 20 MG/100ML; MG/100ML; MG/100ML; MG/100ML
125 INJECTION, SOLUTION INTRAVENOUS CONTINUOUS
Status: DISCONTINUED | OUTPATIENT
Start: 2018-12-17 | End: 2018-12-17

## 2018-12-17 RX ORDER — SODIUM CHLORIDE, SODIUM GLUCONATE, SODIUM ACETATE, POTASSIUM CHLORIDE, MAGNESIUM CHLORIDE, SODIUM PHOSPHATE, DIBASIC, AND POTASSIUM PHOSPHATE .53; .5; .37; .037; .03; .012; .00082 G/100ML; G/100ML; G/100ML; G/100ML; G/100ML; G/100ML; G/100ML
125 INJECTION, SOLUTION INTRAVENOUS CONTINUOUS
Status: DISCONTINUED | OUTPATIENT
Start: 2018-12-17 | End: 2018-12-18

## 2018-12-17 RX ADMIN — HYDROMORPHONE HYDROCHLORIDE 0.5 MG: 1 INJECTION, SOLUTION INTRAMUSCULAR; INTRAVENOUS; SUBCUTANEOUS at 14:25

## 2018-12-17 RX ADMIN — HYDROMORPHONE HYDROCHLORIDE 0.5 MG: 1 INJECTION, SOLUTION INTRAMUSCULAR; INTRAVENOUS; SUBCUTANEOUS at 02:45

## 2018-12-17 RX ADMIN — HYDROMORPHONE HYDROCHLORIDE 0.5 MG: 1 INJECTION, SOLUTION INTRAMUSCULAR; INTRAVENOUS; SUBCUTANEOUS at 22:35

## 2018-12-17 RX ADMIN — SODIUM CHLORIDE, SODIUM GLUCONATE, SODIUM ACETATE, POTASSIUM CHLORIDE, MAGNESIUM CHLORIDE, SODIUM PHOSPHATE, DIBASIC, AND POTASSIUM PHOSPHATE 125 ML/HR: .53; .5; .37; .037; .03; .012; .00082 INJECTION, SOLUTION INTRAVENOUS at 19:45

## 2018-12-17 RX ADMIN — PANTOPRAZOLE SODIUM 40 MG: 40 INJECTION, POWDER, FOR SOLUTION INTRAVENOUS at 08:03

## 2018-12-17 RX ADMIN — HEPARIN SODIUM 5000 UNITS: 5000 INJECTION INTRAVENOUS; SUBCUTANEOUS at 05:25

## 2018-12-17 RX ADMIN — HYDROMORPHONE HYDROCHLORIDE 0.5 MG: 1 INJECTION, SOLUTION INTRAMUSCULAR; INTRAVENOUS; SUBCUTANEOUS at 08:03

## 2018-12-17 RX ADMIN — HYDROMORPHONE HYDROCHLORIDE 0.5 MG: 1 INJECTION, SOLUTION INTRAMUSCULAR; INTRAVENOUS; SUBCUTANEOUS at 12:20

## 2018-12-17 RX ADMIN — HEPARIN SODIUM 5000 UNITS: 5000 INJECTION INTRAVENOUS; SUBCUTANEOUS at 13:37

## 2018-12-17 RX ADMIN — HYDROMORPHONE HYDROCHLORIDE 0.5 MG: 1 INJECTION, SOLUTION INTRAMUSCULAR; INTRAVENOUS; SUBCUTANEOUS at 16:25

## 2018-12-17 RX ADMIN — HYDRALAZINE HYDROCHLORIDE 10 MG: 20 INJECTION INTRAMUSCULAR; INTRAVENOUS at 09:58

## 2018-12-17 RX ADMIN — HYDROMORPHONE HYDROCHLORIDE 0.5 MG: 1 INJECTION, SOLUTION INTRAMUSCULAR; INTRAVENOUS; SUBCUTANEOUS at 00:43

## 2018-12-17 RX ADMIN — SODIUM CHLORIDE, SODIUM GLUCONATE, SODIUM ACETATE, POTASSIUM CHLORIDE, MAGNESIUM CHLORIDE, SODIUM PHOSPHATE, DIBASIC, AND POTASSIUM PHOSPHATE 125 ML/HR: .53; .5; .37; .037; .03; .012; .00082 INJECTION, SOLUTION INTRAVENOUS at 11:26

## 2018-12-17 RX ADMIN — HYDROMORPHONE HYDROCHLORIDE 0.5 MG: 1 INJECTION, SOLUTION INTRAMUSCULAR; INTRAVENOUS; SUBCUTANEOUS at 05:25

## 2018-12-17 RX ADMIN — HYDROMORPHONE HYDROCHLORIDE 0.5 MG: 1 INJECTION, SOLUTION INTRAMUSCULAR; INTRAVENOUS; SUBCUTANEOUS at 10:17

## 2018-12-17 RX ADMIN — HYDROMORPHONE HYDROCHLORIDE 0.5 MG: 1 INJECTION, SOLUTION INTRAMUSCULAR; INTRAVENOUS; SUBCUTANEOUS at 20:35

## 2018-12-17 RX ADMIN — HEPARIN SODIUM 5000 UNITS: 5000 INJECTION INTRAVENOUS; SUBCUTANEOUS at 23:28

## 2018-12-17 RX ADMIN — HYDROMORPHONE HYDROCHLORIDE 0.5 MG: 1 INJECTION, SOLUTION INTRAMUSCULAR; INTRAVENOUS; SUBCUTANEOUS at 18:35

## 2018-12-17 RX ADMIN — SODIUM CHLORIDE, SODIUM LACTATE, POTASSIUM CHLORIDE, AND CALCIUM CHLORIDE 125 ML/HR: .6; .31; .03; .02 INJECTION, SOLUTION INTRAVENOUS at 07:40

## 2018-12-17 RX ADMIN — SODIUM CHLORIDE 150 ML/HR: 0.9 INJECTION, SOLUTION INTRAVENOUS at 02:50

## 2018-12-17 NOTE — MALNUTRITION/BMI
This medical record reflects one or more clinical indicators suggestive of malnutrition  Malnutrition Findings:   Malnutrition type: Chronic illness (Related to medical condition as evidenced by 7% weight loss over the past month and <75% energy intake needs met >1 month treated with pending diet advancement  )  Degree of Malnutrition: Other severe protein calorie malnutrition  Malnutrition Characteristics: Inadequate energy, Weight loss        See Nutrition note dated 12/17/18 for additional details  Completed nutrition assessment is viewable in the nutrition documentation

## 2018-12-17 NOTE — PROGRESS NOTES
Still complains of abdominal pain  No nausea, no vomiting  He is hungry and he wants to eat  No bowel movements  Temperature 98 7°, heart rate 78, 1730/104  Awake, comfortable  Skin:  Warm, no jaundice  Abdomen:  Soft less, less distended  MRCP normal result without evidence of choledocholithiasis  ALT/AST decreasing  bilirubin normal     Impressions/recommendations:   1  acute pancreatitis, likely passed common bile duct stone  MRCP normal   Continue to observe  Overall improving  Start clear liquids  Follow LFTs    Follow as an outpatient

## 2018-12-17 NOTE — SOCIAL WORK
Cm reviewed patient  Patient not stable for discharge today  Patient presented with acute pancreatitis  Patient receiving IVF and is now on clear liquid diet  Patient to go for MRCP  Patient's LFTs to be monitored  GI following  Cm continues to follow and work on patient's discharge plan

## 2018-12-17 NOTE — QUICK NOTE
Nurse-Patient-Provider rounds were completed with the patient's nurse today, Cristina  We discussed the plan is to advance to a clear liquid diet and advance slowly; continue his IV fluids for now  The official MRCP results demonstrated no stones and the patient will not require any further GI intervention at this time  We reviewed all of the invasive devices/lines/telemetry orders   - None  Pain Assessment / Plan:  - Continue multimodal analgesic regimen with transition to oral medications today  Mobility Assessment / Plan:  - Activity as tolerated  Goals / Barriers for discharge:  - Anticipate discharge in the next 24-48 hours pending continued improvement  - Case management following; case and discharge needs discussed  All questions and concerns were addressed  I spent greater than 10 minutes reviewing the plan with the patient and the nurse, and coordinating care for the day      Bri Johnson PA-C  12/17/2018 03:38 PM

## 2018-12-17 NOTE — CONSULTS
Consult Service: Gastroenterology      PATIENT INFORMATION      Cesar Dill 52 y o  male MRN: [id number]  Unit/Bed#: [facility] 815-01 Encounter: [id number]  PCP: Colbert Libman, DO  Date of Admission:  12/15/2018  Date of Consultation: 12/17/18  Requesting Physician: DO Tanner Mcelroy   Cesar Dill is a 52 y o  male  With a past medical history of coronary artery disease, PAF (on Eliquis last taken 3days ago) , hypertension, diabetes mellitus, cholecystitis and biliary pancreatitis status post cholecystectomy who is originally admitted for and is being consulted for pancreatitis  1    Biliary pancreatitis (Interstitial edematous with no peripancreatic fluid or necrosis)  Patient meets pancreatitis criteria with epigastric pain and elevated lipase (21672)  Diagnosis was confirmed with CT scan which showed diffuse peripancreatic inflammatory fat stranding and fluid consistent with acute pancreatitis  Despite having cholecystectomy, patient still has biliary pancreatitis so suspect this is due to retained stone (vs Sphincter of Oddi dysfunction)  His weight loss (15lbs in past month) could have precipitated stone formation  Unfortunately, patient is still reporting significant 10/10 abdominal pain in the lower quadrants and 5/10 pain in the epigastric area  His nausea and vomiting however resolved  Fortunately, his LFTs are trending downwords so suspect he passed his stone  · MRCP pending official read  Wet read doesn't show any stones in CBD  Likely no indication for ERCP as patient does not have fever, evidence of CBD dilation  He did have an elevated T bili initially of 3 44, though blood was hemolyzed at that time which could falsely elevate the T bili  Fortunately the T bili si nearly normal now at 1 03   · Continue NPO until official MRCP read    · Switch IVF to LR   · Continue to hold Eliquis (last taken three days ago)  · Analgesia per primary team  · Trend LFT's, fortunately they are trending downward so suspect he passed the stone  · TBili: 3 44 --> 1 01 --> 1 03  · AST: 3 97 --> 249 --> 63  · ALT: 406 --> 359 --> 193  · ALP: 574 --> 558 --> 414        REASON FOR CONSULTATION       Pancreatitis      HISTORY OF PRESENT ILLNESS      Marbin Baeza is a 52 y o  male  With a past medical history of coronary artery disease, hypertension, diabetes mellitus, cholecystitis and biliary pancreatitis status post cholecystectomy who is originally admitted for and is being consulted for pancreatitis  Of note, mid November patient presents to the hospital with cholecystitis and biliary pancreatitis  At that time, it was  decided that interval cholecystectomy at a later date would be the best option  However, on 12/04/2018 patient presented with acute on chronic cholecystitis and had cholecystectomy during the hospitalization with resolution of symptoms on discharge  However, on 12/12/2018 he presented with similar symptoms of abdominal pain, however workup was negative in the ED and he was sent home  He returned again on 12/15/2018 with similar abdominal pain  He describes the pain as epigastric in location with radiation to the lower abdominal quadrant, sharp in quality, and nausea/vomiting (nonbloody, nonbilious)  He also states he has lost 15 pound since his initial discharge on 11/18/2018  Due to decreased appetite  His last bowel movement was Friday  He still says his lower abdominal pain is significant and not improved  REVIEW OF SYSTEMS     A thorough 12-point review of systems has been conducted  Pertinent positives and negatives are mentioned in the history of present illness         PAST MEDICAL & SURGICAL HISTORY      Past Medical History:   Diagnosis Date    CAD (coronary artery disease)     Diabetes mellitus ([facility] [address] )     Hypertension        Past Surgical History:   Procedure Laterality Date    CHOLECYSTECTOMY LAPAROSCOPIC N/A 12/4/2018    Procedure: CHOLECYSTECTOMY LAPAROSCOPIC;  Surgeon: Tera Easley MD;  Location: [facility];  Service: General    HERNIA REPAIR           MEDICATIONS & ALLERGIES       Medications:   Prior to Admission medications    Medication Sig Start Date End Date Taking?  Authorizing Provider   acetaminophen (TYLENOL) 325 mg tablet Take 3 tablets (975 mg total) by mouth every 8 (eight) hours as needed for mild pain 12/6/18   Erica Wiggins PA-C   albuterol (PROVENTIL HFA,VENTOLIN HFA) 90 mcg/act inhaler Inhale 1 puff as needed for wheezing    Historical Provider, MD   amLODIPine (NORVASC) 10 mg tablet Take 10 mg by mouth daily    Historical Provider, MD   apixaban (ELIQUIS) 5 mg Take 5 mg by mouth 2 (two) times a day    Historical Provider, MD   aspirin (ECOTRIN LOW STRENGTH) 81 mg EC tablet Take 81 mg by mouth daily    Historical Provider, MD   atorvastatin (LIPITOR) 80 mg tablet Take 80 mg by mouth daily at bedtime      Historical Provider, MD   carvedilol (COREG) 25 mg tablet Take 50 mg by mouth 2 (two) times a day with meals      Historical Provider, MD   glyBURIDE (DIABETA) 5 mg tablet Take 5 mg by mouth daily with breakfast    Historical Provider, MD   isosorbide mononitrate (IMDUR) 60 mg 24 hr tablet Take 60 mg by mouth daily    Historical Provider, MD   lisinopril (ZESTRIL) 40 mg tablet Take 40 mg by mouth daily      Historical Provider, MD   metFORMIN (GLUCOPHAGE) 1000 MG tablet Take 1,000 mg by mouth 2 (two) times a day with meals    Historical Provider, MD   mirtazapine (REMERON) 15 mg tablet Take 15 mg by mouth daily at bedtime    Historical Provider, MD   omeprazole (PriLOSEC) 20 mg delayed release capsule Take 20 mg by mouth daily    Historical Provider, MD   oxyCODONE (ROXICODONE) 5 mg immediate release tablet Take 1-2 tablets (5-10 mg total) by mouth every 4 (four) hours as needed for moderate pain or severe pain for up to 10 days Max Daily Amount: 60 mg 12/12/18 12/22/18  Equilla Rubinstein, MD   spironolactone (ALDACTONE) 50 mg tablet Take 50 mg by mouth daily      Historical Provider, MD       Allergies: No Known Allergies      SOCIAL HISTORY      Marital Status: Single    Substance Use History:   History   Alcohol Use    Yes     Comment: social     History   Smoking Status    Never Smoker   Smokeless Tobacco    Never Used     History   Drug Use No         FAMILY HISTORY      NA      PHYSICAL EXAM     Vitals:   Blood Pressure: 140/80 (12/16/18 2320)  Pulse: 80 (12/16/18 2320)  Temperature: 98 °F (36 7 °C) (12/16/18 2320)  Temp Source: Oral (12/16/18 2320)  Respirations: 18 (12/16/18 2320)  Height: 6' 2" (188 cm) (12/16/18 0805)  Weight - Scale: 118 kg (260 lb 1 oz) (12/16/18 0805)  SpO2: 95 % (12/16/18 2320)    Physical Exam:   GENERAL: NAD  HEENT:  NC/AT, PERRL, EOMI, MMM, no scleral icterus  CARDIAC:  RRR, +S1/S2, no S3/S4 heard, no m/g/r  PULMONARY:  CTA B/L, no wheezing/rales/rhonci, non-labored breathing  ABDOMEN:  Soft,   Moderate tenderness to palpation diffusely, worse in the suprapubic and epigastric areas  ND, +BS, no rebound/guarding/rigidity  Extremities:  2+ Pulses in DP/PT  No edema, cyanosis, or clubbing  NEUROLOGIC:  Alert/oriented x3  ADDITIONAL DATA     Lab Results:       Results from last 7 days  Lab Units 12/16/18  0453 12/15/18  1903   WBC Thousand/uL 12 18* 8 30   HEMOGLOBIN g/dL 15 8 16 8   HEMATOCRIT % 45 5 48 0   PLATELETS Thousands/uL 298 310   NEUTROS PCT %  --  67   LYMPHS PCT %  --  19   MONOS PCT %  --  5   EOS PCT %  --  8*       Results from last 7 days  Lab Units 12/16/18  0453   POTASSIUM mmol/L 3 7   CHLORIDE mmol/L 101   CO2 mmol/L 26   BUN mg/dL 17   CREATININE mg/dL 1 38*   CALCIUM mg/dL 8 4   ALK PHOS U/L 558*   ALT U/L 359*   AST U/L 249*           Imaging:    Ct Abdomen Pelvis With Contrast    Result Date: 12/15/2018  Narrative: CT ABDOMEN AND PELVIS WITH IV CONTRAST INDICATION:   RUQ pain, n/v, s/p jhon 12/4, concern for abscess vs  bile leak vs  obstruction   COMPARISON:  CT abdomen and pelvis 12/12/2018 TECHNIQUE:  CT examination of the abdomen and pelvis was performed  Axial, sagittal, and coronal 2D reformatted images were created from the source data and submitted for interpretation  Radiation dose length product (DLP) for this visit:  957 57 mGy-cm   This examination, like all CT scans performed in the [facility], was performed utilizing techniques to minimize radiation dose exposure, including the use of iterative  reconstruction and automated exposure control  IV Contrast:  100 mL of iohexol (OMNIPAQUE) Enteric Contrast:  Enteric contrast was not administered  FINDINGS: ABDOMEN LOWER CHEST:  Mild bibasilar atelectasis  LIVER/BILIARY TREE:  Liver is normal in size and contour  Subcentimeter hypodensities in the right hepatic lobe are too small to characterize  GALLBLADDER:  Gallbladder is surgically absent  There is improvement of trace amount of fluid previously noted in the surgical bed  No organized fluid collection is present to suggest an abscess  SPLEEN:  Unremarkable  PANCREAS:  The pancreas is mildly prominent likely due to edema  There is diffuse peripancreatic inflammatory fat stranding and fluid consistent with acute pancreatitis  ADRENAL GLANDS:  Unremarkable  KIDNEYS/URETERS:  Stable 3 5 cm partially exophytic cyst in the midportion of the right kidney  Stable nonobstructing bilateral renal calculi  No hydronephrosis  STOMACH AND BOWEL:  Unremarkable  APPENDIX:  No findings to suggest appendicitis  ABDOMINOPELVIC CAVITY:  No ascites or free intraperitoneal air  No lymphadenopathy  VESSELS:  Unremarkable for patient's age  PELVIS REPRODUCTIVE ORGANS:  Unremarkable for patient's age  URINARY BLADDER:  Unremarkable  ABDOMINAL WALL/INGUINAL REGIONS:  Small fat-containing left inguinal hernia  Evidence of prior right inguinal hernia repair  OSSEOUS STRUCTURES:  No acute fracture or destructive osseous lesion  Impression: 1    Findings consistent with acute pancreatitis  2   Status post cholecystectomy  No organized or drainable fluid collection in the surgical bed  3   Stable nonobstructing bilateral renal calculi  The study was marked in [facility] for immediate notification  Workstation performed: [id number]     Ct Abdomen Pelvis With Contrast    Result Date: 12/12/2018  Narrative: CT ABDOMEN AND PELVIS WITH IV CONTRAST INDICATION:   RUQ abd pain 1wk s/p lap jhon  COMPARISON:  November 14, 2018  TECHNIQUE:  CT examination of the abdomen and pelvis was performed  Axial, sagittal, and coronal 2D reformatted images were created from the source data and submitted for interpretation  Radiation dose length product (DLP) for this visit:  1155 mGy-cm   This examination, like all CT scans performed in the [place], was performed utilizing techniques to minimize radiation dose exposure, including the use of iterative reconstruction and automated exposure control  IV Contrast:  100 mL of iohexol (OMNIPAQUE) Enteric Contrast:  Enteric contrast was not administered  FINDINGS: ABDOMEN LOWER CHEST:  Mild bibasilar atelectasis  No airspace consolidation or pleural effusion  LIVER/BILIARY TREE:  Normal size and configuration  No suspicious mass  Redemonstrated are a few subcentimeter hypoattenuating lesions at the hepatic dome, that are too small to characterize, but statistically most likely benign  No intrahepatic or extrahepatic bile duct dilation  The portal and hepatic veins are patent  GALLBLADDER:  Status post cholecystectomy since the prior exam   There is a trace amount of tracking fluid in the gallbladder fossa, likely postsurgical   No organized/drainable abscess collection  SPLEEN:  Unremarkable  PANCREAS:  Unremarkable  ADRENAL GLANDS:  Unremarkable  KIDNEYS/URETERS:  No hydronephrosis    There are bilateral nonobstructing renal calculi measuring up to 3 mm Again seen is a 3 9 cm partially exophytic cyst in the upper pole- interpolar region of right kidney  There are additional subcentimeter hypoattenuating lesions bilaterally that are too small to characterize, but statistically most likely benign  STOMACH AND BOWEL:  No bowel obstruction  APPENDIX:  A normal appendix was visualized  ABDOMINOPELVIC CAVITY:  No ascites or free intraperitoneal air  No lymphadenopathy  VESSELS:  Unremarkable for patient's age  PELVIS REPRODUCTIVE ORGANS:  Normal size prostate with dystrophic calcifications  The seminal vesicles are unremarkable  URINARY BLADDER:  Unremarkable  ABDOMINAL WALL/INGUINAL REGIONS:  Status post prior right inguinal hernia repair  Mild stranding at the umbilicus, likely related to laparoscopy port  OSSEOUS STRUCTURES:  No acute fracture or destructive osseous lesion  Impression: 1  No acute findings in the abdomen or pelvis  2   Status post cholecystectomy since the prior exam   There is a trace amount of tracking fluid in the gallbladder fossa, likely postsurgical   No organized/drainable abscess collection  3   Bilateral nonobstructing renal calculi measuring up to 3 mm, unchanged  Workstation performed: [id number]       EKG, Pathology, and Other Studies Reviewed on Admission:   · EKG: Reviewed      Counseling / Coordination of Care Time: 30 total mins spent n consult  Greater than 50% of total time spent on patient counseling and coordination of care  Georgiana Favre, M D  Internal Medicine PGY-2  12/17/2018 6:30 AM         ** Please Note: This note is constructed using a voice recognition dictation system   **

## 2018-12-17 NOTE — PLAN OF CARE
DISCHARGE PLANNING     Discharge to home or other facility with appropriate resources Progressing        DISCHARGE PLANNING - CARE MANAGEMENT     Discharge to post-acute care or home with appropriate resources Progressing        GASTROINTESTINAL - ADULT     Minimal or absence of nausea and/or vomiting Progressing     Maintains or returns to baseline bowel function Progressing     Maintains adequate nutritional intake Progressing        INFECTION - ADULT     Absence or prevention of progression during hospitalization Progressing     Absence of fever/infection during neutropenic period Progressing        Knowledge Deficit     Patient/family/caregiver demonstrates understanding of disease process, treatment plan, medications, and discharge instructions Progressing        Nutrition/Hydration-ADULT     Nutrient/Hydration intake appropriate for improving, restoring or maintaining nutritional needs Progressing        PAIN - ADULT     Verbalizes/displays adequate comfort level or baseline comfort level Progressing        Potential for Falls     Patient will remain free of falls Progressing        SAFETY ADULT     Patient will remain free of falls Progressing     Maintain or return to baseline ADL function Progressing     Maintain or return mobility status to optimal level Progressing

## 2018-12-17 NOTE — PROGRESS NOTES
Progress Note - General Surgery   Mikki Paula 52 y o  male MRN: [id number]  Unit/Bed#: [facility] 815-01 Encounter: [id number]    Assessment:  53M s/p lap jhon who re-presents with continued abdominal pain concerning for acute gallstone pancreatitis    Plan:  -IVF, NPO  -analgesia prn  -OOb, AAT  -f/u MRCP    Subjective/Objective   Chief Complaint: abdominal pain    Subjective: Pain is steady and not getting better  It is associated with nausea  He has not had any vomiting over the weekend  He is urinating normally but has not had bowel movements/ He denies fevers and chills    Objective:   Blood pressure 140/80, pulse 80, temperature 98 °F (36 7 °C), temperature source Oral, resp  rate 18, height 6' 2" (1 88 m), weight 118 kg (260 lb 1 oz), SpO2 95 %  ,Body mass index is 33 39 kg/m²  Intake/Output Summary (Last 24 hours) at 12/17/18 0550  Last data filed at 12/17/18 0248   Gross per 24 hour   Intake           3982 5 ml   Output             1300 ml   Net           2682 5 ml       Invasive Devices     Peripheral Intravenous Line            Peripheral IV 12/15/18 Left Hand 1 day                Physical Exam:   General: No acute distress  HEENT: Normocephalic, atraumatic =  Neck: Normal ROM  No tracheal deviation  Cardio: Normal rate, regular rhythm  Pulm: Normal respiratory effort  Abdomen: Obese, diffusely tender, non-distended   Lap jhon incisions are CDI  Extremities: MATTHEW, No edema  Neuro: Cranial nerves II-XII intact  Psych: Normal affect      Lab, Imaging and other studies:CBC: No results found for: WBC, HGB, HCT, MCV, PLT, ADJUSTEDWBC, MCH, MCHC, RDW, MPV, NRBC, CMP: No results found for: SODIUM, K, CL, CO2, ANIONGAP, BUN, CREATININE, GLUCOSE, CALCIUM, AST, ALT, ALKPHOS, PROT, BILITOT, EGFR  VTE Pharmacologic Prophylaxis: Heparin  VTE Mechanical Prophylaxis: sequential compression device

## 2018-12-18 VITALS
BODY MASS INDEX: 32.94 KG/M2 | SYSTOLIC BLOOD PRESSURE: 130 MMHG | OXYGEN SATURATION: 96 % | HEIGHT: 74 IN | HEART RATE: 78 BPM | TEMPERATURE: 98.2 F | DIASTOLIC BLOOD PRESSURE: 90 MMHG | RESPIRATION RATE: 20 BRPM | WEIGHT: 256.7 LBS

## 2018-12-18 PROBLEM — E43 SEVERE PROTEIN-CALORIE MALNUTRITION (HCC): Status: ACTIVE | Noted: 2018-12-18

## 2018-12-18 LAB
ALBUMIN SERPL BCP-MCNC: 2.8 G/DL (ref 3.5–5)
ALP SERPL-CCNC: 306 U/L (ref 46–116)
ALT SERPL W P-5'-P-CCNC: 119 U/L (ref 12–78)
ANION GAP SERPL CALCULATED.3IONS-SCNC: 8 MMOL/L (ref 4–13)
AST SERPL W P-5'-P-CCNC: 25 U/L (ref 5–45)
BASOPHILS # BLD AUTO: 0.03 THOUSANDS/ΜL (ref 0–0.1)
BASOPHILS NFR BLD AUTO: 0 % (ref 0–1)
BILIRUB SERPL-MCNC: 0.74 MG/DL (ref 0.2–1)
BUN SERPL-MCNC: 8 MG/DL (ref 5–25)
CALCIUM SERPL-MCNC: 8.9 MG/DL (ref 8.3–10.1)
CHLORIDE SERPL-SCNC: 103 MMOL/L (ref 100–108)
CO2 SERPL-SCNC: 22 MMOL/L (ref 21–32)
CREAT SERPL-MCNC: 0.88 MG/DL (ref 0.6–1.3)
EOSINOPHIL # BLD AUTO: 0.6 THOUSAND/ΜL (ref 0–0.61)
EOSINOPHIL NFR BLD AUTO: 7 % (ref 0–6)
ERYTHROCYTE [DISTWIDTH] IN BLOOD BY AUTOMATED COUNT: 11.6 % (ref 11.6–15.1)
GFR SERPL CREATININE-BSD FRML MDRD: 97 ML/MIN/1.73SQ M
GLUCOSE SERPL-MCNC: 129 MG/DL (ref 65–140)
GLUCOSE SERPL-MCNC: 134 MG/DL (ref 65–140)
GLUCOSE SERPL-MCNC: 197 MG/DL (ref 65–140)
GLUCOSE SERPL-MCNC: 224 MG/DL (ref 65–140)
HCT VFR BLD AUTO: 42.2 % (ref 36.5–49.3)
HGB BLD-MCNC: 14.6 G/DL (ref 12–17)
IMM GRANULOCYTES # BLD AUTO: 0.02 THOUSAND/UL (ref 0–0.2)
IMM GRANULOCYTES NFR BLD AUTO: 0 % (ref 0–2)
LIPASE SERPL-CCNC: 130 U/L (ref 73–393)
LYMPHOCYTES # BLD AUTO: 1.86 THOUSANDS/ΜL (ref 0.6–4.47)
LYMPHOCYTES NFR BLD AUTO: 21 % (ref 14–44)
MCH RBC QN AUTO: 30 PG (ref 26.8–34.3)
MCHC RBC AUTO-ENTMCNC: 34.6 G/DL (ref 31.4–37.4)
MCV RBC AUTO: 87 FL (ref 82–98)
MONOCYTES # BLD AUTO: 0.75 THOUSAND/ΜL (ref 0.17–1.22)
MONOCYTES NFR BLD AUTO: 9 % (ref 4–12)
NEUTROPHILS # BLD AUTO: 5.6 THOUSANDS/ΜL (ref 1.85–7.62)
NEUTS SEG NFR BLD AUTO: 63 % (ref 43–75)
NRBC BLD AUTO-RTO: 0 /100 WBCS
PLATELET # BLD AUTO: 259 THOUSANDS/UL (ref 149–390)
PMV BLD AUTO: 9.5 FL (ref 8.9–12.7)
POTASSIUM SERPL-SCNC: 3.6 MMOL/L (ref 3.5–5.3)
PROT SERPL-MCNC: 6.7 G/DL (ref 6.4–8.2)
RBC # BLD AUTO: 4.87 MILLION/UL (ref 3.88–5.62)
SODIUM SERPL-SCNC: 133 MMOL/L (ref 136–145)
WBC # BLD AUTO: 8.86 THOUSAND/UL (ref 4.31–10.16)

## 2018-12-18 PROCEDURE — C9113 INJ PANTOPRAZOLE SODIUM, VIA: HCPCS | Performed by: ORTHOPAEDIC SURGERY

## 2018-12-18 PROCEDURE — 83690 ASSAY OF LIPASE: CPT | Performed by: SURGERY

## 2018-12-18 PROCEDURE — G8987 SELF CARE CURRENT STATUS: HCPCS

## 2018-12-18 PROCEDURE — 97116 GAIT TRAINING THERAPY: CPT

## 2018-12-18 PROCEDURE — 82948 REAGENT STRIP/BLOOD GLUCOSE: CPT

## 2018-12-18 PROCEDURE — 97166 OT EVAL MOD COMPLEX 45 MIN: CPT

## 2018-12-18 PROCEDURE — 99024 POSTOP FOLLOW-UP VISIT: CPT | Performed by: PHYSICIAN ASSISTANT

## 2018-12-18 PROCEDURE — 80053 COMPREHEN METABOLIC PANEL: CPT | Performed by: SURGERY

## 2018-12-18 PROCEDURE — 85025 COMPLETE CBC W/AUTO DIFF WBC: CPT | Performed by: SURGERY

## 2018-12-18 RX ORDER — ACETAMINOPHEN 325 MG/1
975 TABLET ORAL EVERY 8 HOURS SCHEDULED
Status: DISCONTINUED | OUTPATIENT
Start: 2018-12-18 | End: 2018-12-18 | Stop reason: HOSPADM

## 2018-12-18 RX ORDER — AMOXICILLIN 250 MG
1 CAPSULE ORAL 2 TIMES DAILY
Status: DISCONTINUED | OUTPATIENT
Start: 2018-12-18 | End: 2018-12-18 | Stop reason: HOSPADM

## 2018-12-18 RX ORDER — CARVEDILOL 25 MG/1
25 TABLET ORAL 2 TIMES DAILY WITH MEALS
Status: DISCONTINUED | OUTPATIENT
Start: 2018-12-18 | End: 2018-12-18 | Stop reason: HOSPADM

## 2018-12-18 RX ORDER — HYDROMORPHONE HCL/PF 1 MG/ML
0.5 SYRINGE (ML) INJECTION EVERY 6 HOURS PRN
Status: DISCONTINUED | OUTPATIENT
Start: 2018-12-18 | End: 2018-12-18 | Stop reason: HOSPADM

## 2018-12-18 RX ORDER — OXYCODONE HYDROCHLORIDE 5 MG/1
5 TABLET ORAL EVERY 4 HOURS PRN
Status: DISCONTINUED | OUTPATIENT
Start: 2018-12-18 | End: 2018-12-18 | Stop reason: HOSPADM

## 2018-12-18 RX ORDER — POLYETHYLENE GLYCOL 3350 17 G/17G
17 POWDER, FOR SOLUTION ORAL DAILY
Status: DISCONTINUED | OUTPATIENT
Start: 2018-12-18 | End: 2018-12-18 | Stop reason: HOSPADM

## 2018-12-18 RX ORDER — AMLODIPINE BESYLATE 10 MG/1
10 TABLET ORAL DAILY
Status: DISCONTINUED | OUTPATIENT
Start: 2018-12-18 | End: 2018-12-18 | Stop reason: HOSPADM

## 2018-12-18 RX ORDER — ISOSORBIDE MONONITRATE 60 MG/1
60 TABLET, EXTENDED RELEASE ORAL DAILY
Status: DISCONTINUED | OUTPATIENT
Start: 2018-12-18 | End: 2018-12-18 | Stop reason: HOSPADM

## 2018-12-18 RX ORDER — ACETAMINOPHEN 325 MG/1
650 TABLET ORAL EVERY 6 HOURS PRN
Status: DISCONTINUED | OUTPATIENT
Start: 2018-12-18 | End: 2018-12-18

## 2018-12-18 RX ORDER — BISACODYL 10 MG
10 SUPPOSITORY, RECTAL RECTAL ONCE
Status: DISCONTINUED | OUTPATIENT
Start: 2018-12-18 | End: 2018-12-18 | Stop reason: HOSPADM

## 2018-12-18 RX ORDER — SODIUM CHLORIDE, SODIUM GLUCONATE, SODIUM ACETATE, POTASSIUM CHLORIDE, MAGNESIUM CHLORIDE, SODIUM PHOSPHATE, DIBASIC, AND POTASSIUM PHOSPHATE .53; .5; .37; .037; .03; .012; .00082 G/100ML; G/100ML; G/100ML; G/100ML; G/100ML; G/100ML; G/100ML
1000 INJECTION, SOLUTION INTRAVENOUS ONCE
Status: DISCONTINUED | OUTPATIENT
Start: 2018-12-18 | End: 2018-12-18 | Stop reason: HOSPADM

## 2018-12-18 RX ORDER — LISINOPRIL 20 MG/1
40 TABLET ORAL DAILY
Status: DISCONTINUED | OUTPATIENT
Start: 2018-12-18 | End: 2018-12-18 | Stop reason: HOSPADM

## 2018-12-18 RX ORDER — PANTOPRAZOLE SODIUM 40 MG/1
40 TABLET, DELAYED RELEASE ORAL
Status: DISCONTINUED | OUTPATIENT
Start: 2018-12-19 | End: 2018-12-18 | Stop reason: HOSPADM

## 2018-12-18 RX ORDER — SPIRONOLACTONE 50 MG/1
50 TABLET, FILM COATED ORAL DAILY
Status: DISCONTINUED | OUTPATIENT
Start: 2018-12-18 | End: 2018-12-18 | Stop reason: HOSPADM

## 2018-12-18 RX ORDER — OXYCODONE HYDROCHLORIDE 10 MG/1
10 TABLET ORAL EVERY 4 HOURS PRN
Status: DISCONTINUED | OUTPATIENT
Start: 2018-12-18 | End: 2018-12-18 | Stop reason: HOSPADM

## 2018-12-18 RX ORDER — OXYCODONE HYDROCHLORIDE 5 MG/1
2.5-5 TABLET ORAL EVERY 4 HOURS PRN
Qty: 20 TABLET | Refills: 0 | Status: SHIPPED | OUTPATIENT
Start: 2018-12-18 | End: 2018-12-28

## 2018-12-18 RX ADMIN — LISINOPRIL 40 MG: 20 TABLET ORAL at 10:37

## 2018-12-18 RX ADMIN — SENNOSIDES AND DOCUSATE SODIUM 1 TABLET: 8.6; 5 TABLET ORAL at 09:16

## 2018-12-18 RX ADMIN — HEPARIN SODIUM 5000 UNITS: 5000 INJECTION INTRAVENOUS; SUBCUTANEOUS at 05:39

## 2018-12-18 RX ADMIN — OXYCODONE HYDROCHLORIDE 10 MG: 10 TABLET ORAL at 08:35

## 2018-12-18 RX ADMIN — HEPARIN SODIUM 5000 UNITS: 5000 INJECTION INTRAVENOUS; SUBCUTANEOUS at 14:04

## 2018-12-18 RX ADMIN — ACETAMINOPHEN 975 MG: 325 TABLET ORAL at 08:53

## 2018-12-18 RX ADMIN — HYDROMORPHONE HYDROCHLORIDE 0.5 MG: 1 INJECTION, SOLUTION INTRAMUSCULAR; INTRAVENOUS; SUBCUTANEOUS at 02:57

## 2018-12-18 RX ADMIN — HYDROMORPHONE HYDROCHLORIDE 0.5 MG: 1 INJECTION, SOLUTION INTRAMUSCULAR; INTRAVENOUS; SUBCUTANEOUS at 00:47

## 2018-12-18 RX ADMIN — INSULIN LISPRO 4 UNITS: 100 INJECTION, SOLUTION INTRAVENOUS; SUBCUTANEOUS at 11:28

## 2018-12-18 RX ADMIN — SPIRONOLACTONE 50 MG: 50 TABLET ORAL at 10:36

## 2018-12-18 RX ADMIN — SENNOSIDES AND DOCUSATE SODIUM 1 TABLET: 8.6; 5 TABLET ORAL at 17:38

## 2018-12-18 RX ADMIN — PANTOPRAZOLE SODIUM 40 MG: 40 INJECTION, POWDER, FOR SOLUTION INTRAVENOUS at 08:35

## 2018-12-18 RX ADMIN — OXYCODONE HYDROCHLORIDE 10 MG: 10 TABLET ORAL at 12:53

## 2018-12-18 RX ADMIN — AMLODIPINE BESYLATE 10 MG: 10 TABLET ORAL at 10:37

## 2018-12-18 RX ADMIN — CARVEDILOL 25 MG: 25 TABLET, FILM COATED ORAL at 17:37

## 2018-12-18 RX ADMIN — ISOSORBIDE MONONITRATE 60 MG: 60 TABLET, EXTENDED RELEASE ORAL at 10:37

## 2018-12-18 RX ADMIN — CARVEDILOL 25 MG: 25 TABLET, FILM COATED ORAL at 10:36

## 2018-12-18 RX ADMIN — INSULIN LISPRO 2 UNITS: 100 INJECTION, SOLUTION INTRAVENOUS; SUBCUTANEOUS at 18:29

## 2018-12-18 RX ADMIN — HYDROMORPHONE HYDROCHLORIDE 0.5 MG: 1 INJECTION, SOLUTION INTRAMUSCULAR; INTRAVENOUS; SUBCUTANEOUS at 05:39

## 2018-12-18 RX ADMIN — ACETAMINOPHEN 975 MG: 325 TABLET ORAL at 14:04

## 2018-12-18 RX ADMIN — HYDRALAZINE HYDROCHLORIDE 10 MG: 20 INJECTION INTRAMUSCULAR; INTRAVENOUS at 08:54

## 2018-12-18 RX ADMIN — POLYETHYLENE GLYCOL 3350 17 G: 17 POWDER, FOR SOLUTION ORAL at 09:16

## 2018-12-18 RX ADMIN — SODIUM CHLORIDE, SODIUM GLUCONATE, SODIUM ACETATE, POTASSIUM CHLORIDE, MAGNESIUM CHLORIDE, SODIUM PHOSPHATE, DIBASIC, AND POTASSIUM PHOSPHATE 125 ML/HR: .53; .5; .37; .037; .03; .012; .00082 INJECTION, SOLUTION INTRAVENOUS at 03:05

## 2018-12-18 RX ADMIN — HYDRALAZINE HYDROCHLORIDE 10 MG: 20 INJECTION INTRAMUSCULAR; INTRAVENOUS at 00:47

## 2018-12-18 RX ADMIN — OXYCODONE HYDROCHLORIDE 10 MG: 10 TABLET ORAL at 17:37

## 2018-12-18 NOTE — SOCIAL WORK
Cm reviewed patient during care coordination rounds with Bishop Terry  Patient anticipated for discharge home this afternoon pending how he tolerates diet  Patient's sister to transport after 6:30PM   VERÓNICA Fuentes aware  [facility]  Awaiting cost determination/coverage  Cm following

## 2018-12-18 NOTE — ASSESSMENT & PLAN NOTE
Malnutrition Findings:   Malnutrition type: Chronic illness (Related to medical condition as evidenced by 7% weight loss over the past month and <75% energy intake needs met >1 month treated with pending diet advancement  )  Degree of Malnutrition: Other severe protein calorie malnutrition    BMI Findings: Body mass index is 32 96 kg/m²  Severe protein calorie malnutrition, present on admission

## 2018-12-18 NOTE — DISCHARGE SUMMARY
Discharge- Kamari Gr 1965, 52 y o  male MRN: [id number]    [facility] Encounter: [id number]    Primary Care Provider: Kacie Cline DO   Date and time admitted to hospital: 12/15/2018  6:28 PM        * Acute pancreatitis   Assessment & Plan    - Acute pancreatitis status post recent laparoscopic cholecystectomy likely secondary to biliary disease   - Diet as tolerated  A low-fat, light diet has been recommended  - Continue multimodal analgesic regimen as needed  - Stable for discharge on 12/18/2018 with outpatient follow-up in the surgical clinic in approximately 10 days  Severe protein-calorie malnutrition ([address] )   Assessment & Plan    Malnutrition Findings:   Malnutrition type: Chronic illness (Related to medical condition as evidenced by 7% weight loss over the past month and <75% energy intake needs met >1 month treated with pending diet advancement  )  Degree of Malnutrition: Other severe protein calorie malnutrition    BMI Findings: Body mass index is 32 96 kg/m²  Severe protein calorie malnutrition, present on admission  Discharge Summary - General Surgery   Kamari Gr 52 y o  male MRN: [id number]  [facility] Encounter: [id number]    Admission Date: 12/15/2018     Discharge Date: 12/18/2018    Admitting Diagnosis: Pancreatitis [K85 90]  Abdominal pain [R10 9]  Nausea and vomiting [R11 2]  RUQ abdominal pain [R10 11]  Acute pancreatitis, unspecified complication status, unspecified pancreatitis type [K85 90]    Discharge Diagnosis: See above  Attending and Service: Dr [facility]  Consulting Physician(s): [facility]  Imaging and Procedures Performed:     Mri Abdomen W Wo Contrast And Mrcp    Result Date: 12/17/2018  Impression: 1  Normal MRCP  2   Acute pancreatitis  3   Small focus of soft tissue density at the liver tip with susceptibility artifact    This correlates with similar density on CT and 4 mm calcification consistent with dropped gallstone with foreign body reaction  I personally discussed this study with Anisa Melara on 12/17/2018 at 10:06 AM  Workstation performed: [id number]     Ct Abdomen Pelvis With Contrast    Result Date: 12/15/2018  Impression: 1  Findings consistent with acute pancreatitis  2   Status post cholecystectomy  No organized or drainable fluid collection in the surgical bed  3   Stable nonobstructing bilateral renal calculi  The study was marked in [facility] for immediate notification  Workstation performed: [id number]     Pathology: N/A    Hospital Course: Elisabeth Aguilar is a 49-year-old male who presented with recurrent severe upper abdominal pain associated with nausea and multiple episodes of emesis as well as subjective fevers and chills  On his initial surgical evaluation, he was hypertensive with a blood pressure in the 150s over 90s, but afebrile with normal vital signs otherwise; his abdomen was soft and nondistended, but tender across the mid abdomen without evidence of peritonitis; the remainder of his exam was unremarkable  His initial workup included the above-noted imaging studies  He was admitted to the acute care surgery service with acute pancreatitis likely of biliary origin  He was kept NPO, started on aggressive IV fluid resuscitation as well as a multimodal analgesic regimen, his electrolytes were repleted as indicated, an MRCP was ordered to evaluate his biliary tree, and GI was consulted in anticipation of possible need for ERCP  With bowel rest and fluid resuscitation as well as an analgesic regimen, the patient has significant improvement in his symptoms over the next 48 hours  The MRCP revealed no evidence of choledocholithiasis, and the GI service did not feel any intervention was necessary  The patient continued to improve and was able to tolerate a regular diet by 12/18/2018    He is now deemed stable for discharge  On discharge, the patient is instructed to follow-up with the patient's primary care provider within the next 2 weeks to review the events of the recent hospitalization  The patient is instructed to follow-up with the [facility] as scheduled on 12/28/2018 at 10:30 AM  The patient is instructed to follow the provided discharge instructions  Condition at Discharge: good     Discharge instructions/Information to patient and family:   See after visit summary for information provided to patient and family  Provisions for Follow-Up Care:  See after visit summary for information related to follow-up care and any pertinent home health orders  Disposition: See After Visit Summary for discharge disposition information  Planned Readmission: No    Discharge Statement   I spent 25 minutes discharging the patient  This time was spent on the day of discharge  I had direct contact with the patient on the day of discharge  Additional documentation is required if more than 30 minutes were spent on discharge  Discharge Medications:  See after visit summary for reconciled discharge medications provided to patient and family      Rogelio Ortiz PA-C  12/18/2018  5:22 PM

## 2018-12-18 NOTE — PHYSICAL THERAPY NOTE
PHYSICAL THERAPY PROGRESS NOTE          Patient Name: Venita Cloud  Today's Date: 12/18/2018 12/18/18 1100   Pain Assessment   Pain Assessment 0-10   Pain Score 7   Pain Type Acute pain   Pain Location Abdomen   Pain Orientation Right   Hospital Pain Intervention(s) Repositioned; Ambulation/increased activity   Response to Interventions tolerated   Restrictions/Precautions   Weight Bearing Precautions Per Order No   Other Precautions Multiple lines;Pain; Fall Risk   General   Chart Reviewed Yes   Family/Caregiver Present No   Cognition   Overall Cognitive Status WFL   Arousal/Participation Alert; Cooperative   Attention Within functional limits   Orientation Level Oriented X4   Following Commands Follows multistep commands without difficulty   Subjective   Subjective "I have found a few positions that help with the pain"   Bed Mobility   Supine to Sit 6  Modified independent   Additional items Increased time required   Transfers   Sit to Stand 6  Modified independent   Additional items Increased time required   Stand to Sit 6  Modified independent   Additional items Increased time required   Ambulation/Elevation   Gait pattern Antalgic;Narrow DALTON; Forward Flexion; Inconsistent graciela; Short stride   Gait Assistance 5  Supervision   Additional items Verbal cues   Assistive Device None   Distance 150' x 2   Stair Management Assistance 5  Supervision   Additional items Verbal cues   Stair Management Technique One rail L;Alternating pattern; Foreward   Number of Stairs 21  (7 x 3)   Balance   Static Sitting Good   Dynamic Sitting Good   Static Standing Fair +   Dynamic Standing Fair   Ambulatory Fair   Endurance Deficit   Endurance Deficit Yes   Endurance Deficit Description pain   Activity Tolerance   Activity Tolerance Patient limited by pain   Nurse Made Aware yes, RN Jing   Assessment   Prognosis Good   Problem List Decreased strength;Decreased endurance; Impaired balance;Decreased mobility   Assessment Pt participated in therapy session focusing on functional mobility tasks  Pt performed bed mobility and transfer with modified independence and increased time required  Pt ambulated 150' x 2 with no AD and supervision assist and no LOB noted  Pt displays decreased DALTON, forward flexed posture, decreased stride length, decreased gait speed  Pt ascended/descended 7 stairs x 3 with L handrail and supervision assist and verbal cues for technique and safety  Provided pt education regarding safe functional mobiltiy techniques, energy conservation techniques, benefits of participating in therapy session  Skilled physical therapy is indicated to address listed functional deficits focusing on strength, endurance and functional mobility  Goals   Patient Goals go home   STG Expiration Date 12/26/18   Short Term Goal #1 Per PT eval 12/16: in 7-10 days:pt will be able to ambulate >300 feet without use of DME on various surfaces without LOB and no rest breaks S->mod (I) to A pt to return to PLOF,activity tolerance;45mins/45mins,inc balance 1/2 grade to dec fall risk,(I) with BLE ther ex HEP in various positions to A pt to inc balance,mobility,endurance and to A to dec pain,BM and transfers completely (I) to and from various surfaces consistently to A pt to return to PLOF,up and down 5 steps with use of rail S level of A to navigate steps at home environment   Treatment Day 1   Plan   Treatment/Interventions Functional transfer training;Elevations; Endurance training;Bed mobility;Gait training;Spoke to nursing;Spoke to case management   Progress Progressing toward goals   PT Frequency (3-5x/wk; restorative therapy aide for mobility)   Recommendation   Recommendation Home with family support   PT - OK to Discharge Yes  (when medically stable)     Mary Davis, PTA

## 2018-12-18 NOTE — ASSESSMENT & PLAN NOTE
- Acute pancreatitis status post recent laparoscopic cholecystectomy likely secondary to biliary disease   - Diet as tolerated  A low-fat, light diet has been recommended  - Continue multimodal analgesic regimen as needed  - Stable for discharge on 12/18/2018 with outpatient follow-up in the surgical clinic in approximately 10 days

## 2018-12-18 NOTE — PLAN OF CARE
Problem: PHYSICAL THERAPY ADULT  Goal: Performs mobility at highest level of function for planned discharge setting  See evaluation for individualized goals  Treatment/Interventions: Functional transfer training, LE strengthening/ROM, Elevations, Therapeutic exercise, Endurance training, Patient/family training, Bed mobility, Gait training, Spoke to nursing          See flowsheet documentation for full assessment, interventions and recommendations  Outcome: Progressing  Prognosis: Good  Problem List: Decreased strength, Decreased endurance, Impaired balance, Decreased mobility  Assessment: Pt participated in therapy session focusing on functional mobility tasks  Pt performed bed mobility and transfer with modified independence and increased time required  Pt ambulated 150' x 2 with no AD and supervision assist and no LOB noted  Pt displays decreased DALTON, forward flexed posture, decreased stride length, decreased gait speed  Pt ascended/descended 7 stairs x 3 with L handrail and supervision assist and verbal cues for technique and safety  Provided pt education regarding safe functional mobiltiy techniques, energy conservation techniques, benefits of participating in therapy session  Skilled physical therapy is indicated to address listed functional deficits focusing on strength, endurance and functional mobility  Barriers to Discharge: Inaccessible home environment ([place],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

## 2018-12-18 NOTE — UTILIZATION REVIEW
Shira Ordaz RN Registered Nurse Signed   Utilization Review Date of Service: 12/16/2018 11:50 AM         []Hide copied text  Initial Clinical Review     Admission: Date/Time/Statement: 12/15/18 @ 2250            Orders Placed This Encounter   Procedures    Inpatient Admission       Standing Status:   Standing       Number of Occurrences:   1       Order Specific Question:   Admitting Physician       Answer:   Trinity Cabrera       Order Specific Question:   Level of Care       Answer:   Med Surg [16]       Order Specific Question:   Estimated length of stay       Answer:   More than 2 Midnights       Order Specific Question:   Certification       Answer:   I certify that inpatient services are medically necessary for this patient for a duration of greater than two midnights  See H&P and MD Progress Notes for additional information about the patient's course of treatment          ED: Date/Time/Mode of Arrival:             ED Arrival Information      Expected Arrival Acuity Means of Arrival Escorted By Service Admission Type     - 12/15/2018 18:26 Urgent Ambulance [facility] Ambulance Surgery-General Urgent     Arrival Complaint     abd pain             Chief Complaint:        Chief Complaint   Patient presents with    Abdominal Pain       pt still with abd pain  pt d/c'd this past thursday with same complaint  pt had gallbladder removed on 12/5/18         History of Illness: 52 y  o  male with history of CAD, HTN, T2DM, and pancreatitis s/p lap jhon 12/4 who presents with acute pancreatitis  Chevy Sanders had a recent bout of acute pancreatitis believed to be due to gallstones, therefore patient received a laparoscopic cholecystectomy  [facility] discharge patient was doing well until he had a similar abdominal pain on 12/12  Abe James was seen in the ED where labs and CT abdomen pelvis were unremarkable, therefore he was sent home  Chevy Sanders returned this evening again with severe abdominal pain, nausea, emesis x4, and subjective fevers/chills      ED Vital Signs:            ED Triage Vitals [12/15/18 1835]   Temperature Pulse Respirations Blood Pressure SpO2   98 °F (36 7 °C) 88 20 (!) 160/107 99 %       Temp Source Heart Rate Source Patient Position - Orthostatic VS BP Location FiO2 (%)   Oral Monitor Sitting Right arm --       Pain Score           Worst Possible Pain                Wt Readings from Last 1 Encounters:   12/16/18 118 kg (260 lb 1 oz)         Vital Signs (abnormal): //107     Abnormal Labs/Diagnostic Test Results:     Ref Range & Units 12/15/18 1938   Sodium 136 - 145 mmol/L 130     Potassium 3 5 - 5 3 mmol/L 5 6     Chloride 100 - 108 mmol/L 99     CO2 21 - 32 mmol/L 26    ANION GAP 4 - 13 mmol/L 5    BUN 5 - 25 mg/dL 19    Creatinine 0 60 - 1 30 mg/dL 1 44     Glucose 65 - 140 mg/dL 240     Calcium 8 3 - 10 1 mg/dL 9 6    AST 5 - 45 U/L 397     ALT 12 - 78 U/L 406     Alkaline Phosphatase 46 - 116 U/L 574     Total Protein 6 4 - 8 2 g/dL 7 3    Albumin 3 5 - 5 0 g/dL 3 5    Total Bilirubin 0 20 - 1 00 mg/dL 3 44          Ref Range & Units 12/15/18 1938   Lipase 73 - 393 u/L 13,402           CT a/p 12/15 -- 1   Findings consistent with acute pancreatitis  2   Status post cholecystectomy   No organized or drainable fluid collection in the surgical bed    3   Stable nonobstructing bilateral renal calculi         ED Treatment:            Medication Administration from 12/15/2018 1826 to 12/15/2018 2326        Date/Time Order Dose Route Action       12/15/2018 1907 ondansetron (ZOFRAN) injection 4 mg 4 mg Intravenous Given       12/15/2018 1908 ketorolac (TORADOL) injection 15 mg 15 mg Intravenous Given       12/15/2018 1908 sucralfate (CARAFATE) oral suspension 1,000 mg 1,000 mg Oral Given       12/15/2018 1908 famotidine (PEPCID) tablet 20 mg 20 mg Oral Given       12/15/2018 2050 iohexol (OMNIPAQUE) 350 MG/ML injection (MULTI-DOSE) 100 mL 100 mL Intravenous Given       12/15/2018 2205 HYDROmorphone (DILAUDID) injection 0 5 mg 0 5 mg Intravenous Given       12/15/2018 2205 multi-electrolyte (ISOLYTE-S PH 7 4) bolus 1,000 mL 1,000 mL Intravenous New Bag         Past Medical/Surgical History:         Active Ambulatory Problems     Diagnosis Date Noted    Acute pancreatitis 11/15/2018    Cholecystitis 11/26/2018           Past Medical History:   Diagnosis Date    CAD (coronary artery disease)      Diabetes mellitus (HCC)      Hypertension           Admitting Diagnosis: Pancreatitis [K85 90]  Abdominal pain [R10 9]  Nausea and vomiting [R11 2]  RUQ abdominal pain [R10 11]  Acute pancreatitis, unspecified complication status, unspecified pancreatitis type [K85 90]     Age/Sex: 52 y o  male     Assessment/Plan:   Assessment:  52 y o  male with history of CAD, HTN, T2DM, and pancreatitis s/p lap jhon 12/4 who presents with acute pancreatitis      Plan:  - admit to red surgery  - Isolyte @ 125  - NPO  - PRN pain and nausea control  - Replace electrolytes PRN  - GI consult  - MRCP     Admission Orders:  Scheduled Meds:   heparin (porcine) 5,000 Units Subcutaneous Q8H [address]   hydrALAZINE 10 mg Intravenous Q6H PRN   HYDROmorphone 0 5 mg Intravenous Q3H PRN 12/15 x1, 12/16 x4   insulin lispro 2-12 Units Subcutaneous Q6H YOLIE   labetalol 20 mg Intravenous Q6H PRN   ondansetron 4 mg Intravenous Q6H PRN   pantoprazole 40 mg Intravenous Q24H YOLIE   promethazine 25 mg Intramuscular Q6H PRN   sodium chloride 150 mL/hr Intravenous Continuous      Npo  Consult acute care surgery  Monitor labs  SCD's  Up with assist  IS q 1h  Encourage deep breathing and coughing  Daily wts  I/O  Fingerstick glucose checks ac & hs  PT/OT evals

## 2018-12-18 NOTE — DISCHARGE INSTRUCTIONS
Acute Care Surgery Discharge Instructions    Please follow-up as instructed  Activity:  - No lifting greater than 20 pounds or strenuous physical activity or exercise for at least 2 weeks  - Walking and normal light activities are encouraged  - Normal daily activities including climbing steps are okay  - No driving until no longer using pain medications  Diet:    - You may continue a low fat, lite diet  Recommend against diet heavy in fat (i e  Dairy, red meats) until feeling better; these foods are likely to cause an upset stomach and/or indigestion  Wound Care:  - May shower daily  No tub baths or swimming until cleared by your surgeon   - Wash incision gently with soap and water and pat dry  - Do not apply any creams or ointments unless instructed to do so by your surgeon  Medications:    - You may resume all of your regular medications, including blood thinners and aspirin, after going home unless otherwise instructed  Please refer to your discharge medication list for further details  - Please take the pain medications as directed  - You are encouraged to use non-narcotic pain medications first and whenever possible  Reserve the use of narcotic pain medication for moderate to severe pain not controlled by non-narcotic medications   - No driving while taking narcotic pain medications  - You may become constipated, especially if taking pain medications  You may take any over the counter stool softeners or laxatives as needed  Examples: Milk of Magnesia, Colace, Senna  Additional Instructions:  - If you have any questions or concerns after discharge please call the office   - Call office or return to ER if fever greater than 101, chills, persistent nausea/vomiting, worsening/uncontrollable pain, and/or increasing redness or purulent/foul smelling drainage from incision(s)  sensory intact

## 2018-12-18 NOTE — SOCIAL WORK
Cm received call from [address] stating patient's oxycodone script needs prior auth  Medication will cost $12 22 without auth  Cm informed patient of this and patient is requesting medical team try for prior auth  Phone number is [phone] and patient's ID # is [id number]  Cm informed Trever Bartholomew who will begin prior auth and also informed RN [facility]  Patient reported if unable to receive prior auth, he would prefer to take script to [facility] to fill  Cm informed VERÓNICA [facility] and [place] of this as well  Cm following

## 2018-12-18 NOTE — PROGRESS NOTES
Pt requesting CPAP which he uses at home  Yonis Olivares from surgery notified for orders   Awaiting response

## 2018-12-18 NOTE — OCCUPATIONAL THERAPY NOTE
[address] Evaluation     Patient Name: Mike Rogers  Today's Date: 12/18/2018  Problem List  Patient Active Problem List   Diagnosis    Acute pancreatitis    Cholecystitis    Severe protein-calorie malnutrition [facility])     Past Medical History  Past Medical History:   Diagnosis Date    CAD (coronary artery disease)     Diabetes mellitus ([facility] [address] )     Hypertension      Past Surgical History  Past Surgical History:   Procedure Laterality Date    CHOLECYSTECTOMY LAPAROSCOPIC N/A 12/4/2018    Procedure: CHOLECYSTECTOMY LAPAROSCOPIC;  Surgeon: Tabitha Courtney MD;  Location: [facility];  Service: General    HERNIA REPAIR           12/18/18 1155   Note Type   Note type Eval only   Restrictions/Precautions   Weight Bearing Precautions Per Order No   Other Precautions O2;Telemetry; Fall Risk   Pain Assessment   Pain Assessment 0-10   Pain Score 7   Pain Location Abdomen   Home Living   Type of Home House  ([facility] 5STE)   Home Layout One level; Able to live on main level with bedroom/bathroom   Bathroom Shower/Tub Walk-in shower   Bathroom Toilet Standard   Bathroom Equipment Shower chair;Grab bars in [facility] (no DME PTA)   Additional Comments Pt lives with his mother and sister in a [facility] with 5STE  Prior Function   Level of [place] Independent with ADLs and functional mobility   Lives With [employer] Help From Family   ADL Assistance Independent   IADLs Independent   Falls in the last 6 months 0   Vocational (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[address] [place] [address] [employer])   Comments Pt was I with ADLs, IADLs and functional mobility without use of DME PTA  Pt is a   Lifestyle   Autonomy Pt was I with ADLs, IADLs and functional mobility without use of DME PTA  Pt is a   Reciprocal Relationships Mother, sister   Service to Others Pt works for [employer] [address] taking care of his mother for the past year    Prior to caring for his mother, pt worked as a catering suprvisior at the [employer] and he is hoping to reutnr to this position in the future  Psychosocial   Psychosocial (WDL) WDL   Subjective   Subjective Pt reports that he has no concerns about completing his daily activities upon d/c   ADL   Eating Assistance 6  Modified independent   Grooming Assistance 6  Modified Independent   UB Bathing Assistance 6  Modified Independent   LB Bathing Assistance 6  Modified Independent   UB Dressing Assistance 6  Modified independent   LB Dressing Assistance 6  Modified independent   LB Dressing Deficit Don/doff L sock; Don/doff R sock   Toileting Assistance  6  Modified independent   Bed Mobility   Additional Comments Pt sitting EOB upon therapist entry   Transfers   Sit to Stand 6  Modified independent   Additional items Increased time required   Stand to Sit 6  Modified independent   Additional items Increased time required   Balance   Static Sitting Good   Dynamic Sitting Good   Static Standing Fair +   Dynamic Standing Fair   Ambulatory Fair   Activity Tolerance   Activity Tolerance Patient limited by pain   Nurse Made Aware Per RN, pt okay to see for OT   RUE Assessment   RUE Assessment WFL   LUE Assessment   LUE Assessment WFL   Hand Function   Gross Motor Coordination Functional   Fine Motor Coordination Functional   Sensation   Light Touch No apparent deficits   Sharp/Dull No apparent deficits   Cognition   Overall Cognitive Status WFL   Arousal/Participation Alert; Cooperative   Attention Within functional limits   Orientation Level Oriented X4   Memory Within functional limits   Following Commands Follows multistep commands without difficulty   Assessment   Prognosis Good   Assessment Pt is a 52year old male seen for OT eval s/p admission to [facility] with acute pancreatitis  Pt had a recent bout of acute pancreatitis believed to be due to gallstones, therefore patient received a laparoscopic cholecystectomy    Pt returned to [facility] wih severe abdominal pain, nausea, emesis x4 and subjective fever/chills  Comorbidities include h/o CAD, HTN, DM and pancreatitis  Pt lives with his mother and sister in a [facility] with 5STE  Pt reports that he takes care of his mother  Pt was I with ADLs, IADLs and functional mobility without use of DME PTA  Pt is currently functioning at his baseline level, and it is not clinically recommended that he continue to receive skilled occupational therapy services  Overall, pt scored 75/100 on the Barthel Index  OT orders to be d/c at this time  Please re-consult OT if medically appropriate     Goals   Patient Goals to go home   Recommendation   OT Discharge Recommendation Home with family support   OT - OK to Discharge Yes  (when medically stable)   Barthel Index   Feeding 10   Bathing 5   Grooming Score 5   Dressing Score 10   Bladder Score 10   Bowels Score 10   Toilet Use Score 10   Transfers (Bed/Chair) Score 15   Mobility (Level Surface) Score 0   Stairs Score 0   Barthel Index Score 75     Dipika Shea, MOT, OTR/L

## 2018-12-18 NOTE — PROGRESS NOTES
Progress Note - General Surgery   Kimber Combs 52 y o  male MRN: [id number]  Unit/Bed#: [facility] 815-01 Encounter: [id number]    Assessment:  58-year-old male status post lap jhon here with acute pancreatitis    Plan:  -clear liquid diet, advance slowly as tolerated  -analgesia p r n   -out of bed, ambulate as tolerated    Subjective/Objective   Chief Complaint:  Abdominal pain    Subjective:  Pain feels slightly better today  Patient is urinating appropriately  He denies nausea, vomiting, fevers, chills  Objective:   Blood pressure (!) 170/110, pulse 76, temperature 98 6 °F (37 °C), temperature source Oral, resp  rate 16, height 6' 2" (1 88 m), weight 116 kg (256 lb 11 2 oz), SpO2 92 %  ,Body mass index is 32 96 kg/m²  Intake/Output Summary (Last 24 hours) at 12/18/18 0529  Last data filed at 12/18/18 0300   Gross per 24 hour   Intake          3444 58 ml   Output              550 ml   Net          2894 58 ml       Invasive Devices     Peripheral Intravenous Line            Peripheral IV 12/15/18 Left Hand 2 days                Physical Exam:   General: No acute distress  HEENT: Normocephalic, atraumatic   Neck: Normal ROM  No tracheal deviation  Cardio: Normal rate, regular rhythm  Pulm: Normal respiratory effort  Abdomen:  Obese, non-distended  Diffusely tender    Lap jhon incisions are clean, dry, intact  Extremities: MATTHEW, No edema  Neuro: Cranial nerves II-XII intact  Psych: Normal affect      Lab, Imaging and other studies:  CBC:   Lab Results   Component Value Date    WBC 10 46 (H) 12/17/2018    HGB 14 7 12/17/2018    HCT 42 6 12/17/2018    MCV 87 12/17/2018     12/17/2018    MCH 29 9 12/17/2018    MCHC 34 5 12/17/2018    RDW 11 5 (L) 12/17/2018    MPV 9 1 12/17/2018    NRBC 0 12/17/2018   , CMP:   Lab Results   Component Value Date    SODIUM 133 (L) 12/17/2018    K 3 8 12/17/2018     12/17/2018    CO2 24 12/17/2018    BUN 11 12/17/2018    CREATININE 0 99 12/17/2018    CALCIUM 8 6 12/17/2018    AST 63 (H) 12/17/2018     (H) 12/17/2018    ALKPHOS 414 (H) 12/17/2018    EGFR 85 12/17/2018     VTE Pharmacologic Prophylaxis: Heparin  VTE Mechanical Prophylaxis: sequential compression device

## 2018-12-24 ENCOUNTER — TELEPHONE (OUTPATIENT)
Dept: SURGERY | Facility: CLINIC | Age: 53
End: 2018-12-24

## 2018-12-24 NOTE — TELEPHONE ENCOUNTER
Patient has been having nausea and vomitting last 3 nights, has massive indigestion, has no pain meds left  Patient had lap jhon, has remnents of gallbladder due to inflammation of pancreatitis  Spoke to Dr Maksim Sloan and if symptoms continue he should go to the ED

## 2018-12-28 ENCOUNTER — OFFICE VISIT (OUTPATIENT)
Dept: SURGERY | Facility: CLINIC | Age: 53
End: 2018-12-28

## 2018-12-28 VITALS
TEMPERATURE: 97.8 F | SYSTOLIC BLOOD PRESSURE: 136 MMHG | BODY MASS INDEX: 32.67 KG/M2 | HEIGHT: 74 IN | WEIGHT: 254.6 LBS | DIASTOLIC BLOOD PRESSURE: 76 MMHG

## 2018-12-28 DIAGNOSIS — Z90.49 STATUS POST CHOLECYSTECTOMY: Primary | ICD-10-CM

## 2018-12-28 DIAGNOSIS — K85.10 ACUTE BILIARY PANCREATITIS, UNSPECIFIED COMPLICATION STATUS: ICD-10-CM

## 2018-12-28 PROCEDURE — 99024 POSTOP FOLLOW-UP VISIT: CPT | Performed by: SURGERY

## 2018-12-28 NOTE — PROGRESS NOTES
Assessment/Plan:      Status post cholecystectomy;    - Intermittent nausea and vomiting   - RUQ pan   -  Possible retained GB remanant   - If persistent symptoms, may need admission to hospital for workup  Acute biliary pancreatitis, unspecified complication status   - resolving    Subjective:      Patient ID: Madi Daniel is a 52 y o  male  with persistent reflux symptoms and intermittent vomiting after LAP choly  Pain is sharp, colicky and has no palliating factors  Precipitated by food  He is able to drink liquids  HPI    The following portions of the patient's history were reviewed and updated as appropriate: allergies, current medications, past family history, past medical history, past social history, past surgical history and problem list     Review of Systems   Constitutional: Positive for appetite change and fatigue  Negative for chills and fever  HENT: Negative  Gastrointestinal: Positive for abdominal pain, nausea and vomiting  Negative for abdominal distention, blood in stool and constipation  Genitourinary: Negative  Objective:      /76 (BP Location: Right arm, Patient Position: Sitting, Cuff Size: Standard)   Temp 97 8 °F (36 6 °C) (Tympanic)   Ht 6' 2" (1 88 m)   Wt 115 kg (254 lb 9 6 oz)   BMI 32 69 kg/m²          Physical Exam   Abdominal: Soft  Bowel sounds are normal  He exhibits no distension and no mass  There is no rebound and no guarding     Minimal RUQ tenderness

## 2018-12-31 ENCOUNTER — ANESTHESIA EVENT (OUTPATIENT)
Dept: GASTROENTEROLOGY | Facility: HOSPITAL | Age: 53
End: 2018-12-31
Payer: COMMERCIAL

## 2018-12-31 ENCOUNTER — ANESTHESIA (OUTPATIENT)
Dept: GASTROENTEROLOGY | Facility: HOSPITAL | Age: 53
End: 2018-12-31
Payer: COMMERCIAL

## 2018-12-31 ENCOUNTER — HOSPITAL ENCOUNTER (OUTPATIENT)
Facility: HOSPITAL | Age: 53
Setting detail: OBSERVATION
Discharge: HOME | End: 2019-01-01
Attending: EMERGENCY MEDICINE | Admitting: SURGERY
Payer: COMMERCIAL

## 2018-12-31 DIAGNOSIS — R11.2 NAUSEA AND VOMITING: ICD-10-CM

## 2018-12-31 DIAGNOSIS — K21.00 GASTROESOPHAGEAL REFLUX DISEASE WITH ESOPHAGITIS: ICD-10-CM

## 2018-12-31 DIAGNOSIS — K21.9 GASTROESOPHAGEAL REFLUX DISEASE, ESOPHAGITIS PRESENCE NOT SPECIFIED: Primary | ICD-10-CM

## 2018-12-31 DIAGNOSIS — R10.11 RUQ ABDOMINAL PAIN: ICD-10-CM

## 2018-12-31 LAB
ALBUMIN SERPL BCP-MCNC: 3.9 G/DL (ref 3.5–5)
ALP SERPL-CCNC: 166 U/L (ref 46–116)
ALT SERPL W P-5'-P-CCNC: 34 U/L (ref 12–78)
ANION GAP SERPL CALCULATED.3IONS-SCNC: 9 MMOL/L (ref 4–13)
AST SERPL W P-5'-P-CCNC: 14 U/L (ref 5–45)
ATRIAL RATE: 72 BPM
BASOPHILS # BLD AUTO: 0.06 THOUSANDS/ΜL (ref 0–0.1)
BASOPHILS NFR BLD AUTO: 1 % (ref 0–1)
BILIRUB SERPL-MCNC: 0.52 MG/DL (ref 0.2–1)
BUN SERPL-MCNC: 15 MG/DL (ref 5–25)
CALCIUM SERPL-MCNC: 9.5 MG/DL (ref 8.3–10.1)
CHLORIDE SERPL-SCNC: 103 MMOL/L (ref 100–108)
CO2 SERPL-SCNC: 26 MMOL/L (ref 21–32)
CREAT SERPL-MCNC: 1.31 MG/DL (ref 0.6–1.3)
EOSINOPHIL # BLD AUTO: 0.32 THOUSAND/ΜL (ref 0–0.61)
EOSINOPHIL NFR BLD AUTO: 5 % (ref 0–6)
ERYTHROCYTE [DISTWIDTH] IN BLOOD BY AUTOMATED COUNT: 11.9 % (ref 11.6–15.1)
GFR SERPL CREATININE-BSD FRML MDRD: 61 ML/MIN/1.73SQ M
GLUCOSE SERPL-MCNC: 131 MG/DL (ref 65–140)
GLUCOSE SERPL-MCNC: 86 MG/DL (ref 65–140)
GLUCOSE SERPL-MCNC: 98 MG/DL (ref 65–140)
HCT VFR BLD AUTO: 50.3 % (ref 36.5–49.3)
HGB BLD-MCNC: 17.1 G/DL (ref 12–17)
HOLD SPECIMEN: NORMAL
IMM GRANULOCYTES # BLD AUTO: 0.02 THOUSAND/UL (ref 0–0.2)
IMM GRANULOCYTES NFR BLD AUTO: 0 % (ref 0–2)
LIPASE SERPL-CCNC: 308 U/L (ref 73–393)
LYMPHOCYTES # BLD AUTO: 2.41 THOUSANDS/ΜL (ref 0.6–4.47)
LYMPHOCYTES NFR BLD AUTO: 35 % (ref 14–44)
MCH RBC QN AUTO: 29.8 PG (ref 26.8–34.3)
MCHC RBC AUTO-ENTMCNC: 34 G/DL (ref 31.4–37.4)
MCV RBC AUTO: 88 FL (ref 82–98)
MONOCYTES # BLD AUTO: 0.33 THOUSAND/ΜL (ref 0.17–1.22)
MONOCYTES NFR BLD AUTO: 5 % (ref 4–12)
NEUTROPHILS # BLD AUTO: 3.67 THOUSANDS/ΜL (ref 1.85–7.62)
NEUTS SEG NFR BLD AUTO: 54 % (ref 43–75)
NRBC BLD AUTO-RTO: 0 /100 WBCS
P AXIS: 47 DEGREES
PLATELET # BLD AUTO: 342 THOUSANDS/UL (ref 149–390)
PMV BLD AUTO: 9.1 FL (ref 8.9–12.7)
POTASSIUM SERPL-SCNC: 4.2 MMOL/L (ref 3.5–5.3)
PR INTERVAL: 162 MS
PROT SERPL-MCNC: 8 G/DL (ref 6.4–8.2)
QRS AXIS: -39 DEGREES
QRSD INTERVAL: 94 MS
QT INTERVAL: 370 MS
QTC INTERVAL: 405 MS
RBC # BLD AUTO: 5.74 MILLION/UL (ref 3.88–5.62)
SODIUM SERPL-SCNC: 138 MMOL/L (ref 136–145)
T WAVE AXIS: 58 DEGREES
VENTRICULAR RATE: 72 BPM
WBC # BLD AUTO: 6.81 THOUSAND/UL (ref 4.31–10.16)

## 2018-12-31 PROCEDURE — 99024 POSTOP FOLLOW-UP VISIT: CPT | Performed by: PHYSICIAN ASSISTANT

## 2018-12-31 PROCEDURE — 93010 ELECTROCARDIOGRAM REPORT: CPT | Performed by: INTERNAL MEDICINE

## 2018-12-31 PROCEDURE — 88305 TISSUE EXAM BY PATHOLOGIST: CPT | Performed by: PATHOLOGY

## 2018-12-31 PROCEDURE — 36415 COLL VENOUS BLD VENIPUNCTURE: CPT

## 2018-12-31 PROCEDURE — 43239 EGD BIOPSY SINGLE/MULTIPLE: CPT | Performed by: INTERNAL MEDICINE

## 2018-12-31 PROCEDURE — 80053 COMPREHEN METABOLIC PANEL: CPT | Performed by: EMERGENCY MEDICINE

## 2018-12-31 PROCEDURE — C9113 INJ PANTOPRAZOLE SODIUM, VIA: HCPCS | Performed by: PHYSICIAN ASSISTANT

## 2018-12-31 PROCEDURE — 82948 REAGENT STRIP/BLOOD GLUCOSE: CPT

## 2018-12-31 PROCEDURE — 93005 ELECTROCARDIOGRAM TRACING: CPT

## 2018-12-31 PROCEDURE — 99284 EMERGENCY DEPT VISIT MOD MDM: CPT

## 2018-12-31 PROCEDURE — 83690 ASSAY OF LIPASE: CPT | Performed by: EMERGENCY MEDICINE

## 2018-12-31 PROCEDURE — 96374 THER/PROPH/DIAG INJ IV PUSH: CPT

## 2018-12-31 PROCEDURE — 85025 COMPLETE CBC W/AUTO DIFF WBC: CPT | Performed by: EMERGENCY MEDICINE

## 2018-12-31 PROCEDURE — 96375 TX/PRO/DX INJ NEW DRUG ADDON: CPT

## 2018-12-31 RX ORDER — ATORVASTATIN CALCIUM 80 MG/1
80 TABLET, FILM COATED ORAL
Status: DISCONTINUED | OUTPATIENT
Start: 2018-12-31 | End: 2019-01-01 | Stop reason: HOSPADM

## 2018-12-31 RX ORDER — HEPARIN SODIUM 5000 [USP'U]/ML
5000 INJECTION, SOLUTION INTRAVENOUS; SUBCUTANEOUS EVERY 8 HOURS SCHEDULED
Status: DISCONTINUED | OUTPATIENT
Start: 2018-12-31 | End: 2018-12-31

## 2018-12-31 RX ORDER — ISOSORBIDE MONONITRATE 60 MG/1
60 TABLET, EXTENDED RELEASE ORAL DAILY
Status: DISCONTINUED | OUTPATIENT
Start: 2019-01-01 | End: 2019-01-01 | Stop reason: HOSPADM

## 2018-12-31 RX ORDER — KETOROLAC TROMETHAMINE 30 MG/ML
15 INJECTION, SOLUTION INTRAMUSCULAR; INTRAVENOUS ONCE
Status: COMPLETED | OUTPATIENT
Start: 2018-12-31 | End: 2018-12-31

## 2018-12-31 RX ORDER — PROPOFOL 10 MG/ML
INJECTION, EMULSION INTRAVENOUS CONTINUOUS PRN
Status: DISCONTINUED | OUTPATIENT
Start: 2018-12-31 | End: 2018-12-31 | Stop reason: SURG

## 2018-12-31 RX ORDER — LISINOPRIL 20 MG/1
40 TABLET ORAL DAILY
Status: DISCONTINUED | OUTPATIENT
Start: 2019-01-01 | End: 2019-01-01 | Stop reason: HOSPADM

## 2018-12-31 RX ORDER — ASPIRIN 81 MG/1
81 TABLET ORAL DAILY
Status: DISCONTINUED | OUTPATIENT
Start: 2019-01-01 | End: 2019-01-01 | Stop reason: HOSPADM

## 2018-12-31 RX ORDER — ONDANSETRON 2 MG/ML
4 INJECTION INTRAMUSCULAR; INTRAVENOUS EVERY 6 HOURS PRN
Status: DISCONTINUED | OUTPATIENT
Start: 2018-12-31 | End: 2019-01-01 | Stop reason: HOSPADM

## 2018-12-31 RX ORDER — HYDRALAZINE HYDROCHLORIDE 20 MG/ML
5 INJECTION INTRAMUSCULAR; INTRAVENOUS EVERY 6 HOURS PRN
Status: DISCONTINUED | OUTPATIENT
Start: 2018-12-31 | End: 2019-01-01 | Stop reason: HOSPADM

## 2018-12-31 RX ORDER — SUCRALFATE ORAL 1 G/10ML
1000 SUSPENSION ORAL EVERY 6 HOURS SCHEDULED
Status: DISCONTINUED | OUTPATIENT
Start: 2018-12-31 | End: 2018-12-31

## 2018-12-31 RX ORDER — AMLODIPINE BESYLATE 10 MG/1
10 TABLET ORAL DAILY
Status: DISCONTINUED | OUTPATIENT
Start: 2019-01-01 | End: 2019-01-01 | Stop reason: HOSPADM

## 2018-12-31 RX ORDER — ACETAMINOPHEN 325 MG/1
975 TABLET ORAL EVERY 8 HOURS SCHEDULED
Status: DISCONTINUED | OUTPATIENT
Start: 2018-12-31 | End: 2019-01-01 | Stop reason: HOSPADM

## 2018-12-31 RX ORDER — PANTOPRAZOLE SODIUM 40 MG/1
40 INJECTION, POWDER, FOR SOLUTION INTRAVENOUS EVERY 12 HOURS SCHEDULED
Status: DISCONTINUED | OUTPATIENT
Start: 2018-12-31 | End: 2019-01-01 | Stop reason: HOSPADM

## 2018-12-31 RX ORDER — OXYCODONE HYDROCHLORIDE 5 MG/1
5 TABLET ORAL EVERY 4 HOURS PRN
Status: DISCONTINUED | OUTPATIENT
Start: 2018-12-31 | End: 2019-01-01 | Stop reason: HOSPADM

## 2018-12-31 RX ORDER — HYDROMORPHONE HCL/PF 1 MG/ML
0.5 SYRINGE (ML) INJECTION EVERY 4 HOURS PRN
Status: DISCONTINUED | OUTPATIENT
Start: 2018-12-31 | End: 2019-01-01

## 2018-12-31 RX ORDER — CARVEDILOL 25 MG/1
50 TABLET ORAL 2 TIMES DAILY WITH MEALS
Status: DISCONTINUED | OUTPATIENT
Start: 2018-12-31 | End: 2019-01-01 | Stop reason: HOSPADM

## 2018-12-31 RX ORDER — PROPOFOL 10 MG/ML
INJECTION, EMULSION INTRAVENOUS AS NEEDED
Status: DISCONTINUED | OUTPATIENT
Start: 2018-12-31 | End: 2018-12-31 | Stop reason: SURG

## 2018-12-31 RX ORDER — SPIRONOLACTONE 50 MG/1
50 TABLET, FILM COATED ORAL DAILY
Status: DISCONTINUED | OUTPATIENT
Start: 2019-01-01 | End: 2019-01-01 | Stop reason: HOSPADM

## 2018-12-31 RX ORDER — ONDANSETRON 2 MG/ML
4 INJECTION INTRAMUSCULAR; INTRAVENOUS ONCE
Status: COMPLETED | OUTPATIENT
Start: 2018-12-31 | End: 2018-12-31

## 2018-12-31 RX ORDER — SODIUM CHLORIDE 9 MG/ML
100 INJECTION, SOLUTION INTRAVENOUS CONTINUOUS
Status: DISCONTINUED | OUTPATIENT
Start: 2018-12-31 | End: 2019-01-01

## 2018-12-31 RX ORDER — LIDOCAINE HYDROCHLORIDE 10 MG/ML
INJECTION, SOLUTION EPIDURAL; INFILTRATION; INTRACAUDAL; PERINEURAL AS NEEDED
Status: DISCONTINUED | OUTPATIENT
Start: 2018-12-31 | End: 2018-12-31 | Stop reason: SURG

## 2018-12-31 RX ORDER — OXYCODONE HYDROCHLORIDE 10 MG/1
10 TABLET ORAL EVERY 4 HOURS PRN
Status: DISCONTINUED | OUTPATIENT
Start: 2018-12-31 | End: 2019-01-01 | Stop reason: HOSPADM

## 2018-12-31 RX ORDER — MIRTAZAPINE 15 MG/1
15 TABLET, FILM COATED ORAL
Status: DISCONTINUED | OUTPATIENT
Start: 2018-12-31 | End: 2019-01-01 | Stop reason: HOSPADM

## 2018-12-31 RX ADMIN — OXYCODONE HYDROCHLORIDE 5 MG: 5 TABLET ORAL at 20:22

## 2018-12-31 RX ADMIN — MIRTAZAPINE 15 MG: 15 TABLET, FILM COATED ORAL at 22:10

## 2018-12-31 RX ADMIN — ACETAMINOPHEN 975 MG: 325 TABLET, FILM COATED ORAL at 22:09

## 2018-12-31 RX ADMIN — KETOROLAC TROMETHAMINE 15 MG: 30 INJECTION, SOLUTION INTRAMUSCULAR at 12:28

## 2018-12-31 RX ADMIN — HYDROMORPHONE HYDROCHLORIDE 0.5 MG: 1 INJECTION, SOLUTION INTRAMUSCULAR; INTRAVENOUS; SUBCUTANEOUS at 17:26

## 2018-12-31 RX ADMIN — SODIUM CHLORIDE 100 ML/HR: 0.9 INJECTION, SOLUTION INTRAVENOUS at 14:15

## 2018-12-31 RX ADMIN — PROPOFOL 170 MCG/KG/MIN: 10 INJECTION, EMULSION INTRAVENOUS at 14:25

## 2018-12-31 RX ADMIN — HYDRALAZINE HYDROCHLORIDE 5 MG: 20 INJECTION INTRAMUSCULAR; INTRAVENOUS at 13:48

## 2018-12-31 RX ADMIN — PROPOFOL 100 MG: 10 INJECTION, EMULSION INTRAVENOUS at 14:24

## 2018-12-31 RX ADMIN — APIXABAN 5 MG: 5 TABLET, FILM COATED ORAL at 18:38

## 2018-12-31 RX ADMIN — PANTOPRAZOLE SODIUM 40 MG: 40 INJECTION, POWDER, FOR SOLUTION INTRAVENOUS at 13:49

## 2018-12-31 RX ADMIN — CARVEDILOL 50 MG: 25 TABLET, FILM COATED ORAL at 16:07

## 2018-12-31 RX ADMIN — PROPOFOL 50 MG: 10 INJECTION, EMULSION INTRAVENOUS at 14:26

## 2018-12-31 RX ADMIN — ONDANSETRON 4 MG: 2 INJECTION INTRAMUSCULAR; INTRAVENOUS at 12:28

## 2018-12-31 RX ADMIN — PANTOPRAZOLE SODIUM 40 MG: 40 INJECTION, POWDER, FOR SOLUTION INTRAVENOUS at 20:22

## 2018-12-31 RX ADMIN — ATORVASTATIN CALCIUM 80 MG: 80 TABLET, FILM COATED ORAL at 22:09

## 2018-12-31 RX ADMIN — SODIUM CHLORIDE 1000 ML: 0.9 INJECTION, SOLUTION INTRAVENOUS at 12:28

## 2018-12-31 RX ADMIN — SODIUM CHLORIDE 100 ML/HR: 0.9 INJECTION, SOLUTION INTRAVENOUS at 16:25

## 2018-12-31 RX ADMIN — HYDROMORPHONE HYDROCHLORIDE 0.5 MG: 1 INJECTION, SOLUTION INTRAMUSCULAR; INTRAVENOUS; SUBCUTANEOUS at 22:09

## 2018-12-31 RX ADMIN — LIDOCAINE HYDROCHLORIDE 100 MG: 10 INJECTION, SOLUTION EPIDURAL; INFILTRATION; INTRACAUDAL; PERINEURAL at 14:24

## 2018-12-31 RX ADMIN — OXYCODONE HYDROCHLORIDE 5 MG: 5 TABLET ORAL at 16:07

## 2018-12-31 NOTE — ED ATTENDING ATTESTATION
Stas Dillon MD, saw and evaluated the patient  I have discussed the patient with the resident/non-physician practitioner and agree with the resident's/non-physician practitioner's findings, Plan of Care, and MDM as documented in the resident's/non-physician practitioner's note, except where noted  All available labs and Radiology studies were reviewed  At this point I agree with the current assessment done in the Emergency Department  I have conducted an independent evaluation of this patient a history and physical is as follows:    64-year-old man with recent history of cholecystectomy and pancreatitis presents with epigastric right upper quadrant abdominal pain  Feels similar to prior pancreatitis  Has had nausea and vomiting  Patient endorses significant weight loss this month  On exam awake and alert mild distress  Heart regular rate rhythm, no murmurs rubs or gallops  Lungs clear to auscultation bilaterally  Abdomen soft, nondistended, there is mild right upper quadrant and epigastric tenderness without rebound or guarding  Plan labs, surgery consult, likely admission        Critical Care Time  CritCare Time    Procedures

## 2018-12-31 NOTE — H&P
Acute Care Surgery  Consultation  Assessment and plan  1  Intermittent nausea and vomiting status post p o  Intake   - GI consult placed   - possible EGD   - start Protonix IV 40 mg b i d    - started on Carafate   - continue to monitor   - continue NPO for now   - fluid resuscitation    2  History of acute pancreatitis status post laparoscopic cholecystectomy   - monitor abdominal exam   - a m  Labs   - lipase normal    3  Biliary disease with retained gallbladder remnant   - continue monitor   - outpatient follow-up   - no acute intervention at this time    4  DVT prophylaxis:  Subcutaneous heparin and SCDs    Disposition:  Admit to [facility] with the surgery team   GI consulted  History of Present Illness   HPI:  Chong Carrera is a 52 y o  male who presents with nausea/vomiting with abdominal pain secondary to p o  Intake  Patient has complex medical history and is well known to the surgery service  Patient was recently admitted in the hospital status post cholecystectomy for acute biliary pancreatitis  Patient during his previous hospital course had undergone MRCP and was followed by the gastroenterology team   Patient's abdominal exam had improved and his pain had improved during his previous hospitalization  GI did not advance his planned and did not perform an ERCP secondary to symptom improvement  Patient was then closely monitored with outpatient follow-up with surgery team after discharge  During his entire discharge course patient continues to complain of 6/10 pain in the right upper quadrant  Reports the pain does not radiate anywhere  He reports the pain continues to be sharp and stabbing  Reports now that his pain is primarily after eating  Reports it takes about an hour and the pain is constant  He reports he has some associated nausea and vomiting  Nonbloody vomit  Regular bowel movements and urination    He reports he has been taking Pepto-Bismol however it has not helped that much   Reports taking no other new medications other than a muscle relaxer  He reports no new changes in his medications since his last discharge  Reports he has not been eating or drinking anything new in fact has try to improve his diet  He reports that he has limited his fatty food intake  Any fevers, chills, sweats  Denies any new chest pain or shortness of breath  Reports that he has not had an upper GI study  Otherwise the patient states that he is feeling fine  He is just curious as to why he is getting all his pain after eating  Review of Systems   Constitutional: Negative  Negative for fever  HENT: Negative  Eyes: Negative  Respiratory: Negative for apnea, chest tightness, shortness of breath and wheezing  Cardiovascular: Negative for chest pain  Gastrointestinal: Positive for abdominal distention, abdominal pain, nausea and vomiting  Negative for constipation and diarrhea  Genitourinary: Negative  Musculoskeletal: Negative  Skin: Negative  Neurological: Negative  Hematological: Negative          Historical Information   Past Medical History:   Diagnosis Date    CAD (coronary artery disease)     Diabetes mellitus ([facility] [address] )     Hypertension      Past Surgical History:   Procedure Laterality Date    CHOLECYSTECTOMY LAPAROSCOPIC N/A 12/4/2018    Procedure: CHOLECYSTECTOMY LAPAROSCOPIC;  Surgeon: Deann Walton MD;  [facility];  Service: General    HERNIA REPAIR       Social History   History   Alcohol Use    Yes     Comment: social     History   Drug Use No     History   Smoking Status    Never Smoker   Smokeless Tobacco    Never Used     Family History   Problem Relation Age of Onset    Hypertension Father        Meds/Allergies   current meds:   Current Facility-Administered Medications   Medication Dose Route Frequency    sodium chloride 0 9 % bolus 1,000 mL  1,000 mL Intravenous Once     No Known Allergies    Objective   First Vitals:   Blood Pressure: (!) 203/133 (12/31/18 1104)  Pulse: 77 (12/31/18 1104)  Temperature: (!) 97 4 °F (36 3 °C) (12/31/18 1104)  Temp Source: Oral (12/31/18 1104)  Respirations: 18 (12/31/18 1104)  Height: 6' 2" (188 cm) (12/31/18 1104)  Weight - Scale: 113 kg (250 lb) (12/31/18 1104)  SpO2: 98 % (12/31/18 1104)    Current Vitals:   Blood Pressure: (!) 203/133 (12/31/18 1104)  Pulse: 77 (12/31/18 1104)  Temperature: (!) 97 4 °F (36 3 °C) (12/31/18 1104)  Temp Source: Oral (12/31/18 1104)  Respirations: 18 (12/31/18 1104)  Height: 6' 2" (188 cm) (12/31/18 1104)  Weight - Scale: 113 kg (250 lb) (12/31/18 1104)  SpO2: 98 % (12/31/18 1104)    No intake or output data in the 24 hours ending 12/31/18 1236    Invasive Devices     Peripheral Intravenous Line            Peripheral IV 12/31/18 Left Antecubital less than 1 day                Physical Exam   Constitutional: He is oriented to person, place, and time  He appears well-developed and well-nourished  No distress  HENT:   Head: Normocephalic and atraumatic  Eyes: Pupils are equal, round, and reactive to light  Conjunctivae and EOM are normal    Neck: Normal range of motion  Neck supple  Cardiovascular: Normal rate, regular rhythm, normal heart sounds and intact distal pulses  Pulmonary/Chest: Effort normal and breath sounds normal  No respiratory distress  He exhibits no tenderness  Abdominal: Soft  Bowel sounds are normal  He exhibits no distension  There is tenderness  There is no rebound and no guarding  Right upper quadrant tenderness  No rebound or guarding  No evidence of peritonitis  Musculoskeletal: Normal range of motion  He exhibits no edema or deformity  Neurological: He is alert and oriented to person, place, and time  No cranial nerve deficit  Skin: Skin is warm and dry  No erythema  Vitals reviewed  Lab Results:   I have personally reviewed pertinent lab results    , CBC:   Lab Results   Component Value Date    WBC 6 81 12/31/2018    HGB 17 1 (H) 12/31/2018    HCT 50 3 (H) 12/31/2018    MCV 88 12/31/2018     12/31/2018    MCH 29 8 12/31/2018    MCHC 34 0 12/31/2018    RDW 11 9 12/31/2018    MPV 9 1 12/31/2018    NRBC 0 12/31/2018   , CMP:   Lab Results   Component Value Date    SODIUM 138 12/31/2018    K 4 2 12/31/2018     12/31/2018    CO2 26 12/31/2018    BUN 15 12/31/2018    CREATININE 1 31 (H) 12/31/2018    CALCIUM 9 5 12/31/2018    AST 14 12/31/2018    ALT 34 12/31/2018    ALKPHOS 166 (H) 12/31/2018    EGFR 61 12/31/2018   , Lipase:   Lab Results   Component Value Date    LIPASE 308 12/31/2018     Imaging: I have personally reviewed pertinent reports  EKG, Pathology, and Other Studies: I have personally reviewed pertinent reports  Counseling / Coordination of Care  Total floor / unit time spent today 32 minutes  Greater than 50% of total time was spent with the patient and / or family counseling and / or coordination of care      Steve Rodriguez PA-C  12/31/2018 12:36 PM

## 2018-12-31 NOTE — QUICK NOTE
Secondary to negative upper endoscopy study will restart patient's anticoagulation at this time  Patient will be discontinued on subcutaneous heparin for DVT prophylaxis and resumed on Eliquis 5 mg b i d   Patient also be resumed on aspirin 81 mg daily

## 2018-12-31 NOTE — ANESTHESIA POSTPROCEDURE EVALUATION
Post-Op Assessment Note      CV Status:  Stable    Mental Status:  Awake and somnolent    Hydration Status:  Stable    PONV Controlled:  Controlled    Airway Patency:  Patent    Post Op Vitals Reviewed: Yes          Staff: CRNA           BP  127/70 (81)   Temp      Pulse   65   Resp   14   SpO2   98% on 4NC

## 2018-12-31 NOTE — ED PROVIDER NOTES
History  Chief Complaint   Patient presents with    Vomiting     vomiting x 6 days, R sided flank pain, indigestion  53 y/o male, s/p cholecystectomy 3 weeks ago and hx of recent pancreatitis, presents to the ED for n/v and RUQ abd pain  Patient states that this has been ongoing for weeks- states that he was seen here 2 weeks ago and diagnosed with pancreatitis  He reports that he was admitted at that time and had an MRI done which showed possible gallbladder remnant and retained gallstone  He states that there was discussion of possible ERCP but he did not end up getting this done at that time  He states that since being home- he has had daily nausea and vomiting every time he eats as well as intermittent epigastric abd burning and RUQ abd pain  He states that he was seen as his surgeon's office 3 days ago and told to return to the ED if symptoms continued  He denies any f/c, cp, sob, d/c, or urinary symptoms  No other complaints  History provided by:  Patient  Abdominal Pain   Pain location:  Epigastric and RUQ  Pain quality: burning and throbbing    Pain radiates to:  Does not radiate  Pain severity:  Moderate  Onset quality:  Sudden  Timing:  Intermittent  Progression:  Unchanged  Chronicity:  New  Context: previous surgery    Relieved by:  Nothing  Worsened by:  Eating  Ineffective treatments:  None tried  Associated symptoms: nausea and vomiting    Associated symptoms: no chest pain, no chills, no constipation, no cough, no diarrhea, no dysuria, no fever, no hematuria, no shortness of breath and no sore throat    Risk factors: multiple surgeries        Prior to Admission Medications   Prescriptions Last Dose Informant Patient Reported?  Taking?   acetaminophen (TYLENOL) 325 mg tablet Past Week at Unknown time  No Yes   Sig: Take 3 tablets (975 mg total) by mouth every 8 (eight) hours as needed for mild pain   amLODIPine (NORVASC) 10 mg tablet 12/30/2018 at Unknown time  Yes Yes   Sig: Take 10 mg by mouth daily   apixaban (ELIQUIS) 5 mg 12/30/2018 at Unknown time  Yes Yes   Sig: Take 5 mg by mouth 2 (two) times a day   aspirin (ECOTRIN LOW STRENGTH) 81 mg EC tablet 12/31/2018 at Unknown time  Yes Yes   Sig: Take 81 mg by mouth daily   atorvastatin (LIPITOR) 80 mg tablet 12/31/2018 at Unknown time  Yes Yes   Sig: Take 80 mg by mouth daily at bedtime     carvedilol (COREG) 25 mg tablet 12/31/2018 at Unknown time  Yes Yes   Sig: Take 50 mg by mouth 2 (two) times a day with meals     glyBURIDE (DIABETA) 5 mg tablet 12/31/2018 at Unknown time  Yes Yes   Sig: Take 5 mg by mouth daily with breakfast   isosorbide mononitrate (IMDUR) 60 mg 24 hr tablet 12/31/2018 at Unknown time  Yes Yes   Sig: Take 60 mg by mouth daily   lisinopril (ZESTRIL) 40 mg tablet 12/31/2018 at Unknown time  Yes Yes   Sig: Take 40 mg by mouth daily     metFORMIN (GLUCOPHAGE) 1000 MG tablet 12/31/2018 at Unknown time  Yes Yes   Sig: Take 1,000 mg by mouth 2 (two) times a day with meals   mirtazapine (REMERON) 15 mg tablet 12/30/2018 at Unknown time  Yes Yes   Sig: Take 15 mg by mouth daily at bedtime   omeprazole (PriLOSEC) 20 mg delayed release capsule 12/31/2018 at Unknown time  Yes Yes   Sig: Take 20 mg by mouth daily   spironolactone (ALDACTONE) 50 mg tablet 12/31/2018 at Unknown time  Yes Yes   Sig: Take 50 mg by mouth daily        Facility-Administered Medications: None       Past Medical History:   Diagnosis Date    CAD (coronary artery disease)     Diabetes mellitus ([facility] [address] )     Hyperlipidemia     Hypertension     Sleep apnea     TIA (transient ischemic attack)        Past Surgical History:   Procedure Laterality Date    CHOLECYSTECTOMY LAPAROSCOPIC N/A 12/4/2018    Procedure: CHOLECYSTECTOMY LAPAROSCOPIC;  Surgeon: Pito Mcneill MD;  Location: [facility];  Service: General    HERNIA REPAIR         Family History   Problem Relation Age of Onset    Hypertension Father      I have reviewed and agree with the history as documented  Social History   Substance Use Topics    Smoking status: Never Smoker    Smokeless tobacco: Never Used    Alcohol use Yes      Comment: social        Review of Systems   Constitutional: Negative for chills and fever  HENT: Negative for congestion, ear pain and sore throat  Eyes: Negative for pain and visual disturbance  Respiratory: Negative for cough, shortness of breath and wheezing  Cardiovascular: Negative for chest pain and leg swelling  Gastrointestinal: Positive for abdominal pain, nausea and vomiting  Negative for constipation and diarrhea  Genitourinary: Negative for dysuria, frequency, hematuria and urgency  Musculoskeletal: Negative for neck pain and neck stiffness  Skin: Negative for rash and wound  Neurological: Negative for weakness, numbness and headaches  Psychiatric/Behavioral: Negative for agitation and confusion  All other systems reviewed and are negative  Physical Exam  ED Triage Vitals   Temperature Pulse Respirations Blood Pressure SpO2   12/31/18 1104 12/31/18 1104 12/31/18 1104 12/31/18 1104 12/31/18 1104   (!) 97 4 °F (36 3 °C) 77 18 (!) 203/133 98 %      Temp Source Heart Rate Source Patient Position - Orthostatic VS BP Location FiO2 (%)   12/31/18 1104 12/31/18 1104 12/31/18 1104 12/31/18 1104 --   Oral Monitor Lying Right arm       Pain Score       12/31/18 1409       7           Orthostatic Vital Signs  Vitals:    12/31/18 1442 12/31/18 1457 12/31/18 1512 12/31/18 1528   BP: 127/70 136/82 158/97 168/100   Pulse: 70 (!) 54 60 62   Patient Position - Orthostatic VS:    Lying       Physical Exam   Constitutional: He is oriented to person, place, and time  He appears well-developed and well-nourished  HENT:   Head: Normocephalic and atraumatic  Mouth/Throat: Oropharynx is clear and moist    Eyes: Pupils are equal, round, and reactive to light  EOM are normal    Neck: Normal range of motion  Neck supple     Cardiovascular: Normal rate and regular rhythm  Pulmonary/Chest: Effort normal and breath sounds normal  No respiratory distress  He has no wheezes  He has no rales  Abdominal: Soft  Bowel sounds are normal  He exhibits no distension  There is tenderness in the right upper quadrant and epigastric area  There is no rigidity, no rebound and no guarding  Surgical sites are clean/dry/intact    Musculoskeletal: Normal range of motion  Neurological: He is alert and oriented to person, place, and time  No focal deficits   Skin: Skin is warm and dry  Nursing note and vitals reviewed        ED Medications  Medications   pantoprazole (PROTONIX) injection 40 mg (40 mg Intravenous Given 12/31/18 1349)   sodium chloride 0 9 % infusion ( Intravenous Stopped 12/31/18 1437)   amLODIPine (NORVASC) tablet 10 mg (not administered)   carvedilol (COREG) tablet 50 mg (50 mg Oral Given 12/31/18 1607)   isosorbide mononitrate (IMDUR) 24 hr tablet 60 mg (not administered)   lisinopril (ZESTRIL) tablet 40 mg (not administered)   atorvastatin (LIPITOR) tablet 80 mg (not administered)   mirtazapine (REMERON) tablet 15 mg (not administered)   spironolactone (ALDACTONE) tablet 50 mg (not administered)   hydrALAZINE (APRESOLINE) injection 5 mg (5 mg Intravenous Given 12/31/18 1348)   oxyCODONE (ROXICODONE) IR tablet 5 mg (5 mg Oral Given 12/31/18 1607)   oxyCODONE (ROXICODONE) immediate release tablet 10 mg (not administered)   HYDROmorphone (DILAUDID) injection 0 5 mg (not administered)   acetaminophen (TYLENOL) tablet 975 mg (0 mg Oral Hold 12/31/18 1346)   insulin lispro (HumaLOG) 100 units/mL subcutaneous injection 1-6 Units (not administered)   ondansetron (ZOFRAN) injection 4 mg (not administered)   apixaban (ELIQUIS) tablet 5 mg (not administered)   aspirin (ECOTRIN LOW STRENGTH) EC tablet 81 mg (not administered)   sodium chloride 0 9 % bolus 1,000 mL (1,000 mL Intravenous New Bag 12/31/18 1228)   ondansetron (ZOFRAN) injection 4 mg (4 mg Intravenous Given 12/31/18 1228)   ketorolac (TORADOL) injection 15 mg (15 mg Intravenous Given 12/31/18 1228)       Diagnostic Studies  Results Reviewed     Procedure Component Value Units Date/Time    Comprehensive metabolic panel [[id number]]  (Abnormal) Collected:  12/31/18 1121    Lab Status:  Final result Specimen:  Blood from Arm, Left Updated:  12/31/18 1151     Sodium 138 mmol/L      Potassium 4 2 mmol/L      Chloride 103 mmol/L      CO2 26 mmol/L      ANION GAP 9 mmol/L      BUN 15 mg/dL      Creatinine 1 31 (H) mg/dL      Glucose 131 mg/dL      Calcium 9 5 mg/dL      AST 14 U/L      ALT 34 U/L      Alkaline Phosphatase 166 (H) U/L      Total Protein 8 0 g/dL      Albumin 3 9 g/dL      Total Bilirubin 0 52 mg/dL      eGFR 61 ml/min/1 73sq m     Narrative:         National Kidney Disease Education Program recommendations are as follows:  GFR calculation is accurate only with a steady state creatinine  Chronic Kidney disease less than 60 ml/min/1 73 sq  meters  Kidney failure less than 15 ml/min/1 73 sq  meters      Lipase [[id number]]  (Normal) Collected:  12/31/18 1121    Lab Status:  Final result Specimen:  Blood from Arm, Left Updated:  12/31/18 1151     Lipase 308 u/L     CBC and differential [[id number]]  (Abnormal) Collected:  12/31/18 1121    Lab Status:  Final result Specimen:  Blood from Arm, Left Updated:  12/31/18 1131     WBC 6 81 Thousand/uL      RBC 5 74 (H) Million/uL      Hemoglobin 17 1 (H) g/dL      Hematocrit 50 3 (H) %      MCV 88 fL      MCH 29 8 pg      MCHC 34 0 g/dL      RDW 11 9 %      MPV 9 1 fL      Platelets 721 Thousands/uL      nRBC 0 /100 WBCs      Neutrophils Relative 54 %      Immat GRANS % 0 %      Lymphocytes Relative 35 %      Monocytes Relative 5 %      Eosinophils Relative 5 %      Basophils Relative 1 %      Neutrophils Absolute 3 67 Thousands/µL      Immature Grans Absolute 0 02 Thousand/uL      Lymphocytes Absolute 2 41 Thousands/µL      Monocytes Absolute 0 33 Thousand/µL Eosinophils Absolute 0 32 Thousand/µL      Basophils Absolute 0 06 Thousands/µL                  No orders to display         Procedures  ECG 12 Lead Documentation  Date/Time: 12/31/2018 12:00 PM  Performed by: Zee Willis by: Troy Overcast     Indications / Diagnosis:  Abd pain   Patient location:  ED  Previous ECG:     Previous ECG:  Compared to current    Comparison ECG info:  12/12/18    Similarity:  No change  Rate:     ECG rate:  72    ECG rate assessment: normal    Rhythm:     Rhythm: sinus rhythm    Ectopy:     Ectopy: none    QRS:     QRS axis:  Normal    QRS intervals:  Normal  ST segments:     ST segments:  Normal  T waves:     T waves: normal            Phone Consults  ED Phone Contact    ED Course  ED Course as of Dec 31 1617   Mon Dec 31, 2018   1230 Spoke with red surgery- will see patient in ED                                 MDM  Number of Diagnoses or Management Options  Gastroesophageal reflux disease, esophagitis presence not specified: new and requires workup  Nausea and vomiting: new and requires workup  RUQ abdominal pain: new and requires workup  Diagnosis management comments: Patient with RUQ, epigastric abd pain, and n/v- will get labs, and consult surgery  Patient reevaluated and feels improved  Patient updated on results of tests and plan of care including admission to hospital for further evaluation of presenting complaint  Patient demonstrates verbal understanding and agrees with plan  Report to Dr Jered Chin with [facility] for continuation of patient care          Amount and/or Complexity of Data Reviewed  Clinical lab tests: ordered and reviewed  Tests in the radiology section of CPT®: ordered and reviewed  Tests in the medicine section of CPT®: ordered and reviewed  Discussion of test results with the performing providers: yes  Decide to obtain previous medical records or to obtain history from someone other than the patient: yes  Obtain history from someone other than the patient: yes  Review and summarize past medical records: yes  Discuss the patient with other providers: yes  Independent visualization of images, tracings, or specimens: yes    Patient Progress  Patient progress: improved    CritCare Time    Disposition  Final diagnoses:   Gastroesophageal reflux disease, esophagitis presence not specified   RUQ abdominal pain   Nausea and vomiting     Time reflects when diagnosis was documented in both MDM as applicable and the Disposition within this note     Time User Action Codes Description Comment    12/31/2018 12:50 PM Toney Dense Add [K21 9] Gastroesophageal reflux disease, esophagitis presence not specified     12/31/2018 12:54 PM Zack Vee Sissy A Add [R10 11] RUQ abdominal pain     12/31/2018 12:54 PM Zack Vee Sissy A Add [R11 2] Nausea and vomiting     12/31/2018  2:35 PM Osei Mullet E Modify [K21 9] Gastroesophageal reflux disease, esophagitis presence not specified     12/31/2018  2:35 PM Osei Mullet E Modify [R10 11] RUQ abdominal pain     12/31/2018  2:35 PM Alie Ralphs [R11 2] Nausea and vomiting       ED Disposition     ED Disposition Condition Comment    Admit  Case was discussed with [facility] and the patient's admission status was agreed to be Admission Status: observation status to the service of Dr Shelia Bauman           Follow-up Information    None         Current Discharge Medication List      CONTINUE these medications which have NOT CHANGED    Details   acetaminophen (TYLENOL) 325 mg tablet Take 3 tablets (975 mg total) by mouth every 8 (eight) hours as needed for mild pain  Qty: 30 tablet, Refills: 0    Associated Diagnoses: Cholecystitis      amLODIPine (NORVASC) 10 mg tablet Take 10 mg by mouth daily      apixaban (ELIQUIS) 5 mg Take 5 mg by mouth 2 (two) times a day      aspirin (ECOTRIN LOW STRENGTH) 81 mg EC tablet Take 81 mg by mouth daily      atorvastatin (LIPITOR) 80 mg tablet Take 80 mg by mouth daily at bedtime        carvedilol (COREG) 25 mg tablet Take 50 mg by mouth 2 (two) times a day with meals        glyBURIDE (DIABETA) 5 mg tablet Take 5 mg by mouth daily with breakfast      isosorbide mononitrate (IMDUR) 60 mg 24 hr tablet Take 60 mg by mouth daily      lisinopril (ZESTRIL) 40 mg tablet Take 40 mg by mouth daily        metFORMIN (GLUCOPHAGE) 1000 MG tablet Take 1,000 mg by mouth 2 (two) times a day with meals      mirtazapine (REMERON) 15 mg tablet Take 15 mg by mouth daily at bedtime      omeprazole (PriLOSEC) 20 mg delayed release capsule Take 20 mg by mouth daily      spironolactone (ALDACTONE) 50 mg tablet Take 50 mg by mouth daily             No discharge procedures on file  ED Provider  Attending physically available and evaluated Adia Tad MCKEON managed the patient along with the ED Attending      Electronically Signed by         Betty Multani DO  12/31/18 [id number]

## 2018-12-31 NOTE — ANESTHESIA PREPROCEDURE EVALUATION
Review of Systems/Medical History  Patient summary reviewed  Chart reviewed  No history of anesthetic complications     Cardiovascular  EKG reviewed, Hypertension , CAD ,    Pulmonary  Sleep apnea CPAP,        GI/Hepatic    GERD ,   Comment: N/V with abdominal pain     Negative  ROS        Endo/Other  Diabetes type 2 ,   Obesity    GYN       Hematology  Negative hematology ROS      Musculoskeletal  Negative musculoskeletal ROS        Neurology  Negative neurology ROS      Psychology   Negative psychology ROS              Physical Exam    Airway    Mallampati score: II  TM Distance: >3 FB  Neck ROM: full     Dental   No notable dental hx     Cardiovascular  Rhythm: regular, Rate: normal, Cardiovascular exam normal    Pulmonary  Pulmonary exam normal Breath sounds clear to auscultation,     Other Findings        Anesthesia Plan  ASA Score- 2     Anesthesia Type- IV sedation with anesthesia with ASA Monitors  Additional Monitors:   Airway Plan:         Plan Factors-    Induction- intravenous  Postoperative Plan-     Informed Consent- Anesthetic plan and risks discussed with patient  I personally reviewed this patient with the CRNA  Discussed and agreed on the Anesthesia Plan with the CRNA  Rosalinda Astorga Recent labs personally reviewed:  Lab Results   Component Value Date    WBC 6 81 12/31/2018    HGB 17 1 (H) 12/31/2018     12/31/2018     Lab Results   Component Value Date     05/24/2015    K 4 2 12/31/2018    BUN 15 12/31/2018    CREATININE 1 31 (H) 12/31/2018    GLUCOSE 131 05/24/2015     Lab Results   Component Value Date    PTT 31 11/14/2018      Lab Results   Component Value Date    INR 1 04 11/16/2018       Blood type O    No results found for: Cassandra Birch MD, have personally seen and evaluated the patient prior to anesthetic care  I have reviewed the pre-anesthetic record, and other medical records if appropriate to the anesthetic care    If a CRNA is involved in the case, I have reviewed the CRNA assessment, if present, and agree  Risks/benefits and alternatives discussed with patient including possible PONV, sore throat, and possibility of rare anesthetic and surgical emergencies

## 2018-12-31 NOTE — OP NOTE
OPERATIVE REPORT  PATIENT NAME: Muriel Doherty    :  1965  MRN: [id number]  Pt Location: [facility] 04    SURGERY DATE: 2018    Surgeon(s) and Role:     * Chiquita Akins MD - Primary    Preop Diagnosis:  Gastroesophageal reflux disease, esophagitis presence not specified [K21 9]  RUQ abdominal pain [R10 11]  Nausea and vomiting [R11 2]    Post-Op Diagnosis Codes:     * Gastroesophageal reflux disease, esophagitis presence not specified [K21 9]     * RUQ abdominal pain [R10 11]     * Nausea and vomiting [R11 2]    Procedure(s) (LRB):  ESOPHAGOGASTRODUODENOSCOPY (EGD) (N/A)    Specimen(s):  ID Type Source Tests Collected by Time Destination   1 :  Tissue Duodenum TISSUE EXAM Chiquita Akins MD 2018 1431    2 : r/o H  Pylori Tissue Stomach TISSUE EXAM Chiquita Akins MD 2018 1432    3 : r/o EoE Tissue Esophagus TISSUE EXAM Chiquita Akins MD 2018 1435        Estimated Blood Loss:   Minimal    ESOPHAGOGASTRODUODENOSCOPY    PROCEDURE: EGD    SEDATION: Monitored anesthesia care, check anesthesia records    ASA Class: 2    INDICATIONS:  Reflux; abdominal discomfort    CONSENT:  Informed consent was obtained for the procedure, including sedation after explaining the risks and benefits of the procedure  Risks including but not limited to bleeding, perforation, infection, and missed lesion  PREPARATION:   Telemetry, pulse oximetry, blood pressure were monitored throughout the procedure  Patient was identified by myself both verbally and by visual inspection of ID band  DESCRIPTION:   Patient was placed in the left lateral decubitus position and was sedated with the above medication  The gastroscope was introduced in to the oropharynx and the esophagus was intubated under direct visualization  Scope was passed down the esophagus up to 2nd part of the duodenum   A careful inspection was made as the gastroscope was withdrawn, including a retroflexed view of the stomach; findings and interventions are described below  FINDINGS:    #1  Esophagus- severe LA class D esophagitis in the distal and midesophagus  Random biopsy performed for eosinophilic esophagitis or proximal esophagus which appeared to be normal    #2  Stomach- normal status post biopsied for H pylori    #3  Duodenum- normal status post biopsy for celiac disease         IMPRESSIONS:      LA class D esophagitis of distal and mid esophagus    RECOMMENDATIONS:     Anti-reflux measures  Ppi b i d  Repeat EGD in 3 months      COMPLICATIONS:  None; patient tolerated the procedure well  SPECIMENS:    ID Type Source Tests Collected by Time Destination   1 :  Tissue Duodenum TISSUE EXAM Tommie Holstein, MD 12/31/2018 1431    2 : r/o H   Pylori Tissue Stomach TISSUE EXAM Tommie Holstein, MD 12/31/2018 1432    3 : r/o EoE Tissue Esophagus TISSUE EXAM Tommie Holstein, MD 12/31/2018 1435        ESTIMATED BLOOD LOSS:  Minimal      SIGNATURE: Tommie Holstein, MD  DATE: December 31, 2018  TIME: 2:36 PM

## 2018-12-31 NOTE — CONSULTS
Consultation -  [facility]  Donney Goodell 52 y o  male MRN: [id number]  Unit/Bed#: ED 23 Encounter: [id number]        ASSESSMENT/PLAN:   RUQ pain  N/V    Pt has had persistent N/V & pain after eating since his gallbladder surgery  He does have a gallbladder reminent which can be possibly causing his sxs  It also appears as there may be a retained stone in the cystic duct  We'll have radiology review  It is also possible his sxs can be from gastritis or PUD as he describes a burning sensation  He has been on Protonix for 2 weeks without improvement  Since he has not eaten we will schedule him for an EGD today for further evaluation  -NPO  -EGD  -Continue PPI  -Discuss with radiology      Consults    Reason for Consult / Principal Problem: RUQ pain    HPI: Donney Goodell is a 52y o  year old male with a PMH of gallstone pancreatitis in Nov  Treated with Abx  He was discharged but sxs returned & he underwent lap jhon on 12/4  He returned on 12/15 again with abd pain & was found to have acute pancreatitis  It was felt he had passed a CBD stone as his LFT's were also elevated  MRI/MRCP that did not show choledocholithiasis  His LFT's & Lipase improved  He was treated conservatively & again discharged  He states since he was discharged he has had nausea & vomiting after almost every meal  He describes pain in the RUQ & burning in the epigastrium & into the chest  He states he was started on Protonix 2 weeks ago without relief  He states he has been taking Pepto every day without relief  He has also been taking a muscle relaxer without relief  He has never had an EGD  Review of Systems: as per HPI  Review of Systems   All other systems reviewed and are negative        Historical Information   Past Medical History:   Diagnosis Date    CAD (coronary artery disease)     Diabetes mellitus ([facility] [address] )     Hypertension      Past Surgical History:   Procedure Laterality Date    CHOLECYSTECTOMY LAPAROSCOPIC N/A 12/4/2018    Procedure: CHOLECYSTECTOMY LAPAROSCOPIC;  Surgeon: Penelope Loza MD;  Location: [facility];  Service: General    HERNIA REPAIR       Social History   History   Alcohol Use    Yes     Comment: social     History   Drug Use No     History   Smoking Status    Never Smoker   Smokeless Tobacco    Never Used     Family History   Problem Relation Age of Onset    Hypertension Father        Meds/Allergies       (Not in a hospital admission)  Current Facility-Administered Medications   Medication Dose Route Frequency    acetaminophen (TYLENOL) tablet 975 mg  975 mg Oral Q8H [facility]    [START ON 1/1/2019] amLODIPine (NORVASC) tablet 10 mg  10 mg Oral Daily    atorvastatin (LIPITOR) tablet 80 mg  80 mg Oral HS    carvedilol (COREG) tablet 50 mg  50 mg Oral BID With Meals    heparin (porcine) subcutaneous injection 5,000 Units  5,000 Units Subcutaneous Q8H [facility]    hydrALAZINE (APRESOLINE) injection 5 mg  5 mg Intravenous Q6H PRN    HYDROmorphone (DILAUDID) injection 0 5 mg  0 5 mg Intravenous Q4H PRN    insulin lispro (HumaLOG) 100 units/mL subcutaneous injection 1-6 Units  1-6 Units Subcutaneous TID AC    [START ON 1/1/2019] isosorbide mononitrate (IMDUR) 24 hr tablet 60 mg  60 mg Oral Daily    [START ON 1/1/2019] lisinopril (ZESTRIL) tablet 40 mg  40 mg Oral Daily    mirtazapine (REMERON) tablet 15 mg  15 mg Oral HS    oxyCODONE (ROXICODONE) immediate release tablet 10 mg  10 mg Oral Q4H PRN    oxyCODONE (ROXICODONE) IR tablet 5 mg  5 mg Oral Q4H PRN    pantoprazole (PROTONIX) injection 40 mg  40 mg Intravenous Q12H [facility]    sodium chloride 0 9 % bolus 1,000 mL  1,000 mL Intravenous Once    sodium chloride 0 9 % infusion  100 mL/hr Intravenous Continuous    [START ON 1/1/2019] spironolactone (ALDACTONE) tablet 50 mg  50 mg Oral Daily    sucralfate (CARAFATE) oral suspension 1,000 mg  1,000 mg Oral Q6H [facility]       No Known Allergies    Objective     Blood pressure (!) 200/110, pulse 68, temperature (!) 97 4 °F (36 3 °C), temperature source Oral, resp  rate (!) 23, height 6' 2" (1 88 m), weight 113 kg (250 lb), SpO2 97 %  No intake or output data in the 24 hours ending 12/31/18 1347    PHYSICAL EXAM     Physical Exam   Constitutional: He is oriented to person, place, and time  He appears well-developed and well-nourished  HENT:   Head: Normocephalic and atraumatic  Eyes: Conjunctivae and EOM are normal    Neck: Normal range of motion  Cardiovascular: Normal rate and regular rhythm  Pulmonary/Chest: Effort normal and breath sounds normal    Abdominal: Soft  Bowel sounds are normal  There is tenderness (RUQ)  Musculoskeletal: Normal range of motion  Neurological: He is alert and oriented to person, place, and time  Skin: Skin is warm and dry  Psychiatric: He has a normal mood and affect   His behavior is normal        Lab Results:   CBC: Lab Results   Component Value Date    WBC 6 81 12/31/2018    HGB 17 1 (H) 12/31/2018    HCT 50 3 (H) 12/31/2018    MCV 88 12/31/2018     12/31/2018    MCH 29 8 12/31/2018    MCHC 34 0 12/31/2018    RDW 11 9 12/31/2018    MPV 9 1 12/31/2018    NRBC 0 12/31/2018   ,   CMP: Lab Results   Component Value Date    K 4 2 12/31/2018     12/31/2018    CO2 26 12/31/2018    BUN 15 12/31/2018    CREATININE 1 31 (H) 12/31/2018    CALCIUM 9 5 12/31/2018    AST 14 12/31/2018    ALT 34 12/31/2018    ALKPHOS 166 (H) 12/31/2018    EGFR 61 12/31/2018   ,   Lipase:   Lab Results   Component Value Date    LIPASE 308 12/31/2018   ,  Imaging Studies: none

## 2019-01-01 VITALS
TEMPERATURE: 97.5 F | HEART RATE: 60 BPM | OXYGEN SATURATION: 93 % | RESPIRATION RATE: 19 BRPM | BODY MASS INDEX: 32.08 KG/M2 | DIASTOLIC BLOOD PRESSURE: 99 MMHG | HEIGHT: 74 IN | SYSTOLIC BLOOD PRESSURE: 177 MMHG | WEIGHT: 250 LBS

## 2019-01-01 LAB
ALBUMIN SERPL BCP-MCNC: 2.7 G/DL (ref 3.5–5)
ALP SERPL-CCNC: 114 U/L (ref 46–116)
ALT SERPL W P-5'-P-CCNC: 27 U/L (ref 12–78)
ANION GAP SERPL CALCULATED.3IONS-SCNC: 8 MMOL/L (ref 4–13)
AST SERPL W P-5'-P-CCNC: 12 U/L (ref 5–45)
BASOPHILS # BLD AUTO: 0.06 THOUSANDS/ΜL (ref 0–0.1)
BASOPHILS NFR BLD AUTO: 1 % (ref 0–1)
BILIRUB SERPL-MCNC: 0.54 MG/DL (ref 0.2–1)
BUN SERPL-MCNC: 13 MG/DL (ref 5–25)
CALCIUM SERPL-MCNC: 8.4 MG/DL (ref 8.3–10.1)
CHLORIDE SERPL-SCNC: 108 MMOL/L (ref 100–108)
CO2 SERPL-SCNC: 23 MMOL/L (ref 21–32)
CREAT SERPL-MCNC: 1.16 MG/DL (ref 0.6–1.3)
EOSINOPHIL # BLD AUTO: 0.36 THOUSAND/ΜL (ref 0–0.61)
EOSINOPHIL NFR BLD AUTO: 5 % (ref 0–6)
ERYTHROCYTE [DISTWIDTH] IN BLOOD BY AUTOMATED COUNT: 11.9 % (ref 11.6–15.1)
GFR SERPL CREATININE-BSD FRML MDRD: 70 ML/MIN/1.73SQ M
GLUCOSE SERPL-MCNC: 172 MG/DL (ref 65–140)
GLUCOSE SERPL-MCNC: 92 MG/DL (ref 65–140)
GLUCOSE SERPL-MCNC: 97 MG/DL (ref 65–140)
HCT VFR BLD AUTO: 43.6 % (ref 36.5–49.3)
HGB BLD-MCNC: 14.5 G/DL (ref 12–17)
IMM GRANULOCYTES # BLD AUTO: 0.01 THOUSAND/UL (ref 0–0.2)
IMM GRANULOCYTES NFR BLD AUTO: 0 % (ref 0–2)
LYMPHOCYTES # BLD AUTO: 2.44 THOUSANDS/ΜL (ref 0.6–4.47)
LYMPHOCYTES NFR BLD AUTO: 33 % (ref 14–44)
MCH RBC QN AUTO: 29.8 PG (ref 26.8–34.3)
MCHC RBC AUTO-ENTMCNC: 33.3 G/DL (ref 31.4–37.4)
MCV RBC AUTO: 90 FL (ref 82–98)
MONOCYTES # BLD AUTO: 0.58 THOUSAND/ΜL (ref 0.17–1.22)
MONOCYTES NFR BLD AUTO: 8 % (ref 4–12)
NEUTROPHILS # BLD AUTO: 4.02 THOUSANDS/ΜL (ref 1.85–7.62)
NEUTS SEG NFR BLD AUTO: 53 % (ref 43–75)
NRBC BLD AUTO-RTO: 0 /100 WBCS
PLATELET # BLD AUTO: 281 THOUSANDS/UL (ref 149–390)
PMV BLD AUTO: 9.5 FL (ref 8.9–12.7)
POTASSIUM SERPL-SCNC: 4 MMOL/L (ref 3.5–5.3)
PROT SERPL-MCNC: 5.9 G/DL (ref 6.4–8.2)
RBC # BLD AUTO: 4.87 MILLION/UL (ref 3.88–5.62)
SODIUM SERPL-SCNC: 139 MMOL/L (ref 136–145)
WBC # BLD AUTO: 7.47 THOUSAND/UL (ref 4.31–10.16)

## 2019-01-01 PROCEDURE — 99024 POSTOP FOLLOW-UP VISIT: CPT | Performed by: SURGERY

## 2019-01-01 PROCEDURE — 85025 COMPLETE CBC W/AUTO DIFF WBC: CPT | Performed by: PHYSICIAN ASSISTANT

## 2019-01-01 PROCEDURE — 82948 REAGENT STRIP/BLOOD GLUCOSE: CPT

## 2019-01-01 PROCEDURE — C9113 INJ PANTOPRAZOLE SODIUM, VIA: HCPCS | Performed by: PHYSICIAN ASSISTANT

## 2019-01-01 PROCEDURE — 80053 COMPREHEN METABOLIC PANEL: CPT | Performed by: PHYSICIAN ASSISTANT

## 2019-01-01 RX ORDER — PANTOPRAZOLE SODIUM 40 MG/1
40 TABLET, DELAYED RELEASE ORAL 2 TIMES DAILY
Qty: 60 TABLET | Refills: 2 | Status: SHIPPED | OUTPATIENT
Start: 2019-01-01 | End: 2019-04-18

## 2019-01-01 RX ORDER — OXYCODONE HYDROCHLORIDE AND ACETAMINOPHEN 5; 325 MG/1; MG/1
1 TABLET ORAL EVERY 4 HOURS PRN
Qty: 10 TABLET | Refills: 0 | Status: SHIPPED | OUTPATIENT
Start: 2019-01-01 | End: 2019-01-05

## 2019-01-01 RX ADMIN — PANTOPRAZOLE SODIUM 40 MG: 40 INJECTION, POWDER, FOR SOLUTION INTRAVENOUS at 08:37

## 2019-01-01 RX ADMIN — AMLODIPINE BESYLATE 10 MG: 10 TABLET ORAL at 08:38

## 2019-01-01 RX ADMIN — LISINOPRIL 40 MG: 20 TABLET ORAL at 08:38

## 2019-01-01 RX ADMIN — CARVEDILOL 50 MG: 25 TABLET, FILM COATED ORAL at 06:31

## 2019-01-01 RX ADMIN — SODIUM CHLORIDE 100 ML/HR: 0.9 INJECTION, SOLUTION INTRAVENOUS at 02:44

## 2019-01-01 RX ADMIN — SPIRONOLACTONE 50 MG: 50 TABLET ORAL at 08:38

## 2019-01-01 RX ADMIN — OXYCODONE HYDROCHLORIDE 5 MG: 5 TABLET ORAL at 01:45

## 2019-01-01 RX ADMIN — ASPIRIN 81 MG: 81 TABLET, COATED ORAL at 08:38

## 2019-01-01 RX ADMIN — ISOSORBIDE MONONITRATE 60 MG: 60 TABLET, EXTENDED RELEASE ORAL at 08:38

## 2019-01-01 RX ADMIN — APIXABAN 5 MG: 5 TABLET, FILM COATED ORAL at 08:38

## 2019-01-01 RX ADMIN — OXYCODONE HYDROCHLORIDE 10 MG: 10 TABLET ORAL at 11:03

## 2019-01-01 RX ADMIN — ACETAMINOPHEN 975 MG: 325 TABLET, FILM COATED ORAL at 06:30

## 2019-01-01 RX ADMIN — HYDROMORPHONE HYDROCHLORIDE 0.5 MG: 1 INJECTION, SOLUTION INTRAMUSCULAR; INTRAVENOUS; SUBCUTANEOUS at 02:46

## 2019-01-01 RX ADMIN — OXYCODONE HYDROCHLORIDE 5 MG: 5 TABLET ORAL at 06:30

## 2019-01-01 NOTE — DISCHARGE SUMMARY
Discharge Summary - General Surgery   Felipe Spears 52 y o  male MRN: [id number]  Unit/Bed#: CW2 212-01 Encounter: [id number]    Admission Date: 12/31/2018     Discharge Date: 01/01/19    Admitting Diagnosis: Nausea and vomiting [R11 2]  RUQ abdominal pain [R10 11]  Flank pain [R10 9]  Gastroesophageal reflux disease, esophagitis presence not specified [K21 9]    Discharge Diagnosis: Same    Attending: Dr Guzman Carreno Physician(s): Dr Beryl Gomez, GI    Procedures Performed:   12/31 EGD - John    Pathology: pending    Hospital Course: Felipe Spears is a 52 y o  male who presents with nausea/vomiting with abdominal pain secondary to p o  Intake  Patient has complex medical history and is well known to the surgery service  Patient was recently admitted in the hospital status post cholecystectomy for acute biliary pancreatitis  Patient during his previous hospital course had undergone MRCP and was followed by the gastroenterology team   Patient's abdominal exam had improved and his pain had improved during his previous hospitalization  GI did not advance his planned and did not perform an ERCP secondary to symptom improvement  Patient was then closely monitored with outpatient follow-up with surgery team after discharge  During his entire discharge course patient continues to complain of 6/10 pain in the right upper quadrant  Reports the pain does not radiate anywhere  He reports the pain continues to be sharp and stabbing  Reports now that his pain is primarily after eating  Reports it takes about an hour and the pain is constant  He reports he has some associated nausea and vomiting  Nonbloody vomit  Regular bowel movements and urination  He reports he has been taking Pepto-Bismol however it has not helped that much  Reports taking no other new medications other than a muscle relaxer  He reports no new changes in his medications since his last discharge    Reports he has not been eating or drinking anything new in fact has try to improve his diet  He reports that he has limited his fatty food intake  Any fevers, chills, sweats  Denies any new chest pain or shortness of breath  Reports that he has not had an upper GI study  Otherwise the patient states that he is feeling fine  He is just curious as to why he is getting all his pain after eating  Patient was admitted and made NPO  He underwent EGD 12/31/2018 with GI and was found to have esophagitis  He was started on protonix BID with improvement of symptoms  By hospital day 1, patient was tolerating a regular diet with minimal pain  He was discharged home with instructions to resume protonix and follow up with GI as an outpatient with repeat scope in 3 months  Condition at Discharge: good     Discharge instructions/Information to patient and family:   See after visit summary for information provided to patient and family  Provisions for Follow-Up Care:  See after visit summary for information related to follow-up care and any pertinent home health orders  Disposition: Home    Planned Readmission: No    Discharge Statement   I spent 20 minutes discharging the patient  This time was spent on the day of discharge  I had direct contact with the patient on the day of discharge  Additional documentation is required if more than 30 minutes were spent on discharge  Discharge Medications:  See after visit summary for reconciled discharge medications provided to patient and family

## 2019-01-01 NOTE — DISCHARGE INSTRUCTIONS
Esophagitis   WHAT YOU NEED TO KNOW:   Esophagitis is inflammation or irritation of the lining of the esophagus  DISCHARGE INSTRUCTIONS:   Call 911 for any of the following:   · You have chest pain that does not go away within a few minutes or gets worse  Seek care immediately if:   · You feel like you have food stuck in your throat and you cannot cough it out  Contact your healthcare provider if:   · You have new or worsening symptoms, even after treatment  · You have questions or concerns about your condition or care  Medicines:   · Medicines  may be given to fight an infection or to control stomach acid  · Take your medicine as directed  Contact your healthcare provider if you think your medicine is not helping or if you have side effects  Tell him or her if you are allergic to any medicine  Keep a list of the medicines, vitamins, and herbs you take  Include the amounts, and when and why you take them  Bring the list or the pill bottles to follow-up visits  Carry your medicine list with you in case of an emergency  Follow up with your healthcare provider as directed: You may need ongoing tests or treatment  Write down your questions so you remember to ask them during your visits  Do not smoke:  Nicotine and other chemicals in cigarettes and cigars can cause blood vessel and lung damage  Ask your healthcare provider for information if you currently smoke and need help to quit  E-cigarettes or smokeless tobacco still contain nicotine  Talk to your healthcare provider before you use these products  Do not drink alcohol:  Alcohol can irritate your esophagus  Talk to your healthcare provider if you need help to stop drinking  Keep batteries and similar objects out of the reach of children:  Babies often put items in their mouths to explore them  Button batteries are easy to swallow and can cause serious damage   Keep the battery covers of electronic devices such as remote controls taped closed  Store all batteries and toxic materials where children cannot get to them  Use childproof locks to keep children away from dangerous materials  Manage or prevent esophagitis:   · Limit or do not eat foods that can lead to esophagitis  Foods such as oranges and salsa can irritate your esophagus  Caffeine and chocolate can cause acid reflux  High-fat and fried foods make your stomach digest food more slowly  This increases the amount of stomach acid your esophagus is exposed to  Eat small meals, and drink water with your meals  Soft foods such as yogurt and applesauce may help soothe your throat  Do not eat for at least 3 hours before you go to bed  · Prevent acid reflux  Do not bend over unless it is necessary  Acid may back up into your esophagus when you bend over  If possible, keep the head of your bed elevated while you sleep  This will help keep acid from backing up  Manage stress  Stress can make your symptoms worse or cause stomach acid to back up  · Drink more liquid when you take pills  Drink a full glass of water when you take your pills  Ask your healthcare provider if you can take your pills at least an hour before you go to bed  © 2017 [facility] Information is for End User's use only and may not be sold, redistributed or otherwise used for commercial purposes  All illustrations and images included in CareNotes® are the copyrighted property of [employer]  or Randall Menchaca  The above information is an  only  It is not intended as medical advice for individual conditions or treatments  Talk to your doctor, nurse or pharmacist before following any medical regimen to see if it is safe and effective for you

## 2019-01-01 NOTE — PROGRESS NOTES
Surgery    Progress Note - Parrish Jackson 1965, 52 y o  male MRN: [id number]    Unit/Bed#: CW2 212-01 Encounter: [id number]    Primary Care Provider: Geraldina Hatchet, DO   Date and time admitted to hospital: 12/31/2018 11:05 AM        Esophagitis   Assessment & Plan    Severe esophagitis as seen by upper endoscopy    Continue treatment per GI w/ BID PPI  Advance to non-ulcerogenic regular diet  D/c IVF  Discharge planning   Outpt GI follow up         Subjective/Objective   Subjective:   States no reflux symptoms laying down since the protonix (new for him)  Pain stable/somewhat improved  Objective:     Blood pressure 143/84, pulse 60, temperature 97 6 °F (36 4 °C), temperature source Oral, resp  rate 18, height 6' 2" (1 88 m), weight 113 kg (250 lb), SpO2 94 %  ,Body mass index is 32 1 kg/m²  Intake/Output Summary (Last 24 hours) at 01/01/19 0650  Last data filed at 12/31/18 1437   Gross per 24 hour   Intake              200 ml   Output                0 ml   Net              200 ml       Invasive Devices     Peripheral Intravenous Line            Peripheral IV 12/31/18 Left Antecubital less than 1 day                Physical Exam:     Gen: NAD, AAOx3  CV: RRR  Pulm: no resp distress  Abd: Soft, non-distended, mildly tender to palp      Lab, Imaging and other studies:  I have personally reviewed pertinent lab results    , CBC:   Lab Results   Component Value Date    WBC 6 81 12/31/2018    HGB 17 1 (H) 12/31/2018    HCT 50 3 (H) 12/31/2018    MCV 88 12/31/2018     12/31/2018    MCH 29 8 12/31/2018    MCHC 34 0 12/31/2018    RDW 11 9 12/31/2018    MPV 9 1 12/31/2018    NRBC 0 12/31/2018   , CMP:   Lab Results   Component Value Date    SODIUM 139 01/01/2019    K 4 0 01/01/2019     01/01/2019    CO2 23 01/01/2019    BUN 13 01/01/2019    CREATININE 1 16 01/01/2019    CALCIUM 8 4 01/01/2019    AST 12 01/01/2019    ALT 27 01/01/2019    ALKPHOS 114 01/01/2019    EGFR 70 01/01/2019     VTE Pharmacologic Prophylaxis: Eliquis  VTE Mechanical Prophylaxis: sequential compression device

## 2019-01-01 NOTE — ASSESSMENT & PLAN NOTE
Severe esophagitis as seen by upper endoscopy    Continue treatment per GI w/ BID PPI  Advance to non-ulcerogenic regular diet  D/c IVF  Discharge planning   Outpt GI follow up

## 2019-01-01 NOTE — UTILIZATION REVIEW
[address] [place] [address] in the [facility] by Randall Menchaca for 2017  Network Utilization Review Department  Phone: [phone]; Fax [phone]  ATTENTION: The Network Utilization Review Department is now centralized for our 7 Facilities  Please call with any questions or concerns to [phone] and carefully follow the prompts so that you are directed to the right person  All voicemails are confidential  Fax any determinations, approvals, denials, and requests for initial or continue stay review clinical to [phone]  Due to HIGH CALL volume, it would be easier if you could please send faxed requests to expedite your requests and in part, help us provide discharge notifications faster   /////////////////////////////////////////////////////////////////////////////////////////////////////////////////    Initial Clinical Review    ADMIT OBS  On  12/31   @  1249     Orders Placed This Encounter   Procedures    Place in Observation     Standing Status:   Standing     Number of Occurrences:   1     Order Specific Question:   Admitting Physician     Answer:   Ismael Goodrich [[id number]]     Order Specific Question:   Level of Care     Answer:   Med Surg [16]         ED: Date/Time/Mode of Arrival:   ED Arrival Information     Expected Arrival [address] of Arrival Escorted By Service Admission Type    - 12/31/2018 10:57 Urgent Walk-In Self Surgery-General Urgent    Arrival Complaint    flank pain          Chief Complaint:   Chief Complaint   Patient presents with    Vomiting     vomiting x 6 days, R sided flank pain, indigestion  History of Illness:     52y o  year old male with a PMH of gallstone pancreatitis in Nov  Treated with Abx  He was discharged but sxs returned & he underwent lap jhon on 12/4  He returned on 12/15 again with abd pain & was found to have acute pancreatitis  It was felt he had passed a CBD stone as his LFT's were also elevated   MRI/MRCP that did not show choledocholithiasis  His LFT's & Lipase improved  He was treated conservatively & again discharged  He states since he was discharged he has had nausea & vomiting after almost every meal  He describes pain in the RUQ & burning in the epigastrium & into the chest  He states he was started on Protonix 2 weeks ago without relief  He states he has been taking Pepto every day without relief  He has also been taking a muscle relaxer without relief  Labs significant only for MONTRELL POA, likely 2/2 dehydration in the setting of N/V   Nataliea Soulier   may have gastritis or PUD given the substernal burning pain     wgt  113 kg (250 lb)    12/31/18 1457  --   54  18  136/82  97 %  None (Room air)  --   12/31/18 1442  --  70  16  127/70  98 %  Nasal cannula  --   12/31/18 1409  97 6 °F (36 4 °C)  66  16   145/112  --  None (Room air)  --   12/31/18 1345  --  70  16  --  98 %  --  --   12/31/18 1330  --  72   25  --  96 %  --  --   12/31/18 1315  --  64  13   200/110  98 %  --  --   12/31/18 1300  --  68  14  --  97 %  --  --   12/31/18 1245  --  68   23   246/119  97 %  --  --   12/31/18 1230  --  70  18  --  97 %  --  --   12/31/18 1215  --  62  15   204/140  95 %  --  --   12/31/18 1200  --  64  13   185/117  94 %  --  --   12/31/18 1120  --  --  --  --  --  None (Room air)  --   12/31/18 1104   97 4 °F (36 3 °C)  77  18   203/133  98 %  None (Room air)  Lying     Abnormal Labs/Diagnostic Test Results:   crt  1 31   1 16  Bun  15    13  Alk phos  166   114  Total bili  0 52   0 54  Wbc  6 81     ekg -  Sinus rhythm with occasional Premature atrial complexes    ED Treatment:   Medication Administration from 12/31/2018 1057 to 12/31/2018 1402       Date/Time Order Dose Route Action Action by Comments     12/31/2018 1228 sodium chloride 0 9 % bolus 1,000 mL 1,000 mL Intravenous New Bag eJsús Thomas RN      12/31/2018 1228 ondansetron (ZOFRAN) injection 4 mg 4 mg Intravenous Given Jesús Thomas RN      12/31/2018 122 ketorolac (TORADOL) injection 15 mg 15 mg Intravenous Given [place] Sukhjinders, RN      12/31/2018 1349 pantoprazole (PROTONIX) injection 40 mg 40 mg Intravenous Given [place] Sukhjinders, RN      12/31/2018 1345 sucralfate (CARAFATE) oral suspension 1,000 mg 0 mg Oral Hold [place], RN vo per GI at bedside     12/31/2018 1348 hydrALAZINE (APRESOLINE) injection 5 mg 5 mg Intravenous Given [place] Sukhjinders, RN      12/31/2018 1346 acetaminophen (TYLENOL) tablet 975 mg 0 mg Oral Hold [place], RN vo per GI at bedside          Past Medical/Surgical History: Active Ambulatory Problems     Diagnosis Date Noted    Acute pancreatitis 11/15/2018    Cholecystitis 11/26/2018    Severe protein-calorie malnutrition ([facility] [address] ) 12/18/2018     Resolved Ambulatory Problems     Diagnosis Date Noted    No Resolved Ambulatory Problems     Past Medical History:   Diagnosis Date    CAD (coronary artery disease)     [facility]     Esophagitis     Hyperlipidemia     Hypertension     Sleep apnea     TIA (transient ischemic attack)        Admitting Diagnosis: Nausea and vomiting [R11 2]  RUQ abdominal pain [R10 11]  Flank pain [R10 9]  Gastroesophageal reflux disease, esophagitis presence not specified [K21 9]    Assessment/Plan:   Admit to surgery  NPO, IVF  Trend UOP, Cr  Start PPI  GI consultation for EGD    12/31  GI CONSULT  He has been on Protonix for 2 weeks without improvement  Since he has not eaten we will schedule him for an EGD today for further evaluation  12/31 EGD -    #1  Esophagus- severe LA class D esophagitis in the distal and midesophagus  Random biopsy performed for eosinophilic esophagitis or proximal esophagus which appeared to be normal   #2  Stomach- normal status post biopsied for H pylori   #3  Duodenum- normal status post biopsy for celiac disease   IMPRESSIONS:  LA class D esophagitis of distal and mid esophagus   RECOMMENDATIONS:    Anti-reflux measures  Ppi b i d    Repeat EGD in 3 months     Admission Orders:  Admit med surg  NPO  IVF NS @ 100  IV zofran and IV toradol  @  3574   IV protonix 40 mg q 12  Sequential compression device to b/l LE  accucks qid w/ssi    POST EGD:   roxicodone po x2;  IV dilaudid x2     1/1/2019  Po roxicodone @  0145 and 0630  IV dilaudid @  0246    Esophagitis   Assessment & Plan     Severe esophagitis as seen by upper endoscopy   Continue treatment per GI w/ BID PPI  Advance to non-ulcerogenic regular diet  D/c IVF  Discharge planning   Outpt GI follow up     Scheduled Meds:   Current Facility-Administered Medications:  acetaminophen 975 mg Oral Q8H [address], PA-C   amLODIPine 10 mg Oral Daily [facility]   apixaban 5 mg Oral BID [facility], [facility]-C   aspirin 81 mg Oral Daily [facility], PA-C   atorvastatin 80 mg Oral HS [place], PA-C   carvedilol 50 mg Oral BID With Meals Blas Hopkins, PA-C   hydrALAZINE 5 mg Intravenous Q6H PRN [facility], [facility]   insulin lispro 1-6 Units Subcutaneous TID AC [facility], PA-C   isosorbide mononitrate 60 mg Oral Daily [facility], PA-C   lisinopril 40 mg Oral Daily [facility], PA-C   mirtazapine 15 mg Oral HS [facility], PA-C   ondansetron 4 mg Intravenous Q6H PRN [place], PA-C   oxyCODONE 10 mg Oral Q4H PRN [place], PA-C   oxyCODONE 5 mg Oral Q4H PRN [facility], PA-C   pantoprazole 40 mg Intravenous Q12H [facility] [place] Kellie, PA-C   spironolactone 50 mg Oral Daily [place], PA-C

## 2019-01-11 ENCOUNTER — OFFICE VISIT (OUTPATIENT)
Dept: SURGERY | Facility: CLINIC | Age: 54
End: 2019-01-11

## 2019-01-11 VITALS
DIASTOLIC BLOOD PRESSURE: 76 MMHG | SYSTOLIC BLOOD PRESSURE: 132 MMHG | HEIGHT: 74 IN | WEIGHT: 252.2 LBS | BODY MASS INDEX: 32.37 KG/M2 | TEMPERATURE: 97.8 F

## 2019-01-11 DIAGNOSIS — K81.9 CHOLECYSTITIS: Primary | ICD-10-CM

## 2019-01-11 DIAGNOSIS — K21.00 GASTROESOPHAGEAL REFLUX DISEASE WITH ESOPHAGITIS: ICD-10-CM

## 2019-01-11 PROCEDURE — 99024 POSTOP FOLLOW-UP VISIT: CPT | Performed by: SURGERY

## 2019-01-11 NOTE — PROGRESS NOTES
Office Visit - General Surgery  Sukumar Baker MRN: [id number]  Encounter: [id number]    Assessment and Plan    Problem List Items Addressed This Visit        Digestive    Cholecystitis - Primary     Reviewed operative notes and recently performed MRCP - No gallbladder remnant present  Discussed risk factors for subsequent pancreatitis  Will follow up in 4 weeks as necessary  Gastroesophageal reflux disease     EGD performed 12/31 and started on protonix BID  Follow-up with GI Medicine scheduled for one week  Chief Complaint:  Sukumar Baker is a 52 y o  male who presents for Post-op (p/o cholecystectomy, acute bilary pancreatitis, nausea and vomitting)    Subjective  Tolerating diet w/o nausea/vomiting  Minimal pain localized to RUQ  Reviewed recent hospitalization for symptomatic esophagitis - seen by GI Medicine  Also reviewed results of MRCP (12/15) and all previous operative notes  No evidence to suggest presence of a gallbladder remnant from operative note or by MRCP report  Past Medical History  Past Medical History:   Diagnosis Date    CAD (coronary artery disease)     Diabetes mellitus ([address] )     Esophagitis     Hyperlipidemia     Hypertension     Sleep apnea     TIA (transient ischemic attack)        Past Surgical History  Past Surgical History:   Procedure Laterality Date    CHOLECYSTECTOMY LAPAROSCOPIC N/A 12/4/2018    Procedure: CHOLECYSTECTOMY LAPAROSCOPIC;  Surgeon: David Gleason MD;  [facility];  Service: General    ESOPHAGOGASTRODUODENOSCOPY N/A 12/31/2018    Procedure: ESOPHAGOGASTRODUODENOSCOPY (EGD); Surgeon: Kelly Rodarte MD;  [facility];   Service: Gastroenterology    HERNIA REPAIR         Family History  Family History   Problem Relation Age of Onset    Hypertension Father        Social History  Social History     Social History    Marital status: Single     Spouse name: N/A    Number of children: N/A    Years of education: N/A Social History Main Topics    Smoking status: Never Smoker    Smokeless tobacco: Never Used    Alcohol use Yes      Comment: social    Drug use: No    Sexual activity: Not Asked     Other Topics Concern    None     Social History Narrative    None        Medications  Current Outpatient Prescriptions on File Prior to Visit   Medication Sig Dispense Refill    acetaminophen (TYLENOL) 325 mg tablet Take 3 tablets (975 mg total) by mouth every 8 (eight) hours as needed for mild pain 30 tablet 0    amLODIPine (NORVASC) 10 mg tablet Take 10 mg by mouth daily      apixaban (ELIQUIS) 5 mg Take 5 mg by mouth 2 (two) times a day      aspirin (ECOTRIN LOW STRENGTH) 81 mg EC tablet Take 81 mg by mouth daily      atorvastatin (LIPITOR) 80 mg tablet Take 80 mg by mouth daily at bedtime        carvedilol (COREG) 25 mg tablet Take 50 mg by mouth 2 (two) times a day with meals        glyBURIDE (DIABETA) 5 mg tablet Take 5 mg by mouth daily with breakfast      isosorbide mononitrate (IMDUR) 60 mg 24 hr tablet Take 60 mg by mouth daily      lisinopril (ZESTRIL) 40 mg tablet Take 40 mg by mouth daily        metFORMIN (GLUCOPHAGE) 1000 MG tablet Take 1,000 mg by mouth 2 (two) times a day with meals      mirtazapine (REMERON) 15 mg tablet Take 15 mg by mouth daily at bedtime      omeprazole (PriLOSEC) 20 mg delayed release capsule Take 20 mg by mouth daily      pantoprazole (PROTONIX) 40 mg tablet Take 1 tablet (40 mg total) by mouth 2 (two) times a day for 30 days 60 tablet 2    spironolactone (ALDACTONE) 50 mg tablet Take 50 mg by mouth daily         No current facility-administered medications on file prior to visit  Allergies  No Known Allergies    Review of Systems   Constitutional: Negative  Inability to Sleep   HENT: Negative  Eyes: Negative  Respiratory: Negative  Cough: [id number]  Cardiovascular: Negative  Gastrointestinal: Negative  Endocrine: Negative  Genitourinary: Negative  Musculoskeletal: Negative  Skin: Negative  Allergic/Immunologic: Negative  Neurological: Negative  Hematological: Negative  Psychiatric/Behavioral: Negative  Objective  Vitals:    01/11/19 1033   BP: 132/76   Temp: 97 8 °F (36 6 °C)       Physical Exam   Abdominal: Soft  He exhibits no distension  There is no tenderness

## 2019-01-11 NOTE — ASSESSMENT & PLAN NOTE
Reviewed operative notes and recently performed MRCP - No gallbladder remnant present  Discussed risk factors for subsequent pancreatitis  Will follow up in 4 weeks as necessary

## 2019-01-18 ENCOUNTER — TRANSCRIBE ORDERS (OUTPATIENT)
Dept: GASTROENTEROLOGY | Facility: CLINIC | Age: 54
End: 2019-01-18

## 2019-01-23 ENCOUNTER — PREP FOR PROCEDURE (OUTPATIENT)
Dept: GASTROENTEROLOGY | Facility: MEDICAL CENTER | Age: 54
End: 2019-01-23

## 2019-01-23 ENCOUNTER — TELEPHONE (OUTPATIENT)
Dept: GASTROENTEROLOGY | Facility: MEDICAL CENTER | Age: 54
End: 2019-01-23

## 2019-01-23 DIAGNOSIS — K20.80 ESOPHAGITIS, LOS ANGELES GRADE D: Primary | ICD-10-CM

## 2019-01-23 NOTE — TELEPHONE ENCOUNTER
Pt is scheduled for recall egd with dr Spencer Anne at [facility] lab on 4/18/19, pt is diabetic and on eliquis  I will fax clearance to [facility] and mailed out diabetic/procedure instructions to pt   He is aware he will need a  to and from and will get a call the day before with exact time of arrival

## 2019-01-23 NOTE — PATIENT INSTRUCTIONS
Pt is scheduled for recall egd with dr Lani Almaraz at [facility] lab on 4/18/19, pt is diabetic and on eliquis  I will fax clearance to [facility] and mailed out diabetic/procedure instructions to pt   He is aware he will need a  to and from and will get a call the day before with exact time of arrival

## 2019-02-08 ENCOUNTER — OFFICE VISIT (OUTPATIENT)
Dept: SURGERY | Facility: CLINIC | Age: 54
End: 2019-02-08

## 2019-02-08 VITALS
DIASTOLIC BLOOD PRESSURE: 78 MMHG | WEIGHT: 262.2 LBS | SYSTOLIC BLOOD PRESSURE: 128 MMHG | TEMPERATURE: 98.3 F | HEIGHT: 74 IN | BODY MASS INDEX: 33.65 KG/M2

## 2019-02-08 DIAGNOSIS — R10.11 RUQ ABDOMINAL PAIN: Primary | ICD-10-CM

## 2019-02-08 PROCEDURE — 99024 POSTOP FOLLOW-UP VISIT: CPT | Performed by: SURGERY

## 2019-02-19 ENCOUNTER — TELEPHONE (OUTPATIENT)
Dept: GASTROENTEROLOGY | Facility: MEDICAL CENTER | Age: 54
End: 2019-02-19

## 2019-04-02 ENCOUNTER — TELEPHONE (OUTPATIENT)
Dept: GASTROENTEROLOGY | Facility: MEDICAL CENTER | Age: 54
End: 2019-04-02

## 2019-04-17 ENCOUNTER — ANESTHESIA EVENT (OUTPATIENT)
Dept: GASTROENTEROLOGY | Facility: HOSPITAL | Age: 54
End: 2019-04-17
Payer: COMMERCIAL

## 2019-04-18 ENCOUNTER — ANESTHESIA (OUTPATIENT)
Dept: GASTROENTEROLOGY | Facility: HOSPITAL | Age: 54
End: 2019-04-18
Payer: COMMERCIAL

## 2019-04-18 ENCOUNTER — HOSPITAL ENCOUNTER (OUTPATIENT)
Facility: HOSPITAL | Age: 54
Setting detail: OUTPATIENT SURGERY
Discharge: HOME | End: 2019-04-18
Attending: INTERNAL MEDICINE | Admitting: INTERNAL MEDICINE
Payer: COMMERCIAL

## 2019-04-18 VITALS
OXYGEN SATURATION: 94 % | BODY MASS INDEX: 32.73 KG/M2 | DIASTOLIC BLOOD PRESSURE: 113 MMHG | RESPIRATION RATE: 20 BRPM | HEIGHT: 74 IN | TEMPERATURE: 98.1 F | WEIGHT: 255 LBS | HEART RATE: 72 BPM | SYSTOLIC BLOOD PRESSURE: 184 MMHG

## 2019-04-18 DIAGNOSIS — K20.80 ESOPHAGITIS, LOS ANGELES GRADE D: ICD-10-CM

## 2019-04-18 PROCEDURE — 88305 TISSUE EXAM BY PATHOLOGIST: CPT | Performed by: PATHOLOGY

## 2019-04-18 PROCEDURE — 82948 REAGENT STRIP/BLOOD GLUCOSE: CPT

## 2019-04-18 PROCEDURE — 43239 EGD BIOPSY SINGLE/MULTIPLE: CPT | Performed by: INTERNAL MEDICINE

## 2019-04-18 PROCEDURE — NC001 PR NO CHARGE: Performed by: INTERNAL MEDICINE

## 2019-04-18 RX ORDER — LABETALOL 20 MG/4 ML (5 MG/ML) INTRAVENOUS SYRINGE
10 ONCE
Status: COMPLETED | OUTPATIENT
Start: 2019-04-18 | End: 2019-04-18

## 2019-04-18 RX ORDER — SODIUM CHLORIDE 9 MG/ML
125 INJECTION, SOLUTION INTRAVENOUS CONTINUOUS
Status: DISCONTINUED | OUTPATIENT
Start: 2019-04-18 | End: 2019-04-18 | Stop reason: HOSPADM

## 2019-04-18 RX ORDER — LABETALOL 20 MG/4 ML (5 MG/ML) INTRAVENOUS SYRINGE
AS NEEDED
Status: DISCONTINUED | OUTPATIENT
Start: 2019-04-18 | End: 2019-04-18 | Stop reason: SURG

## 2019-04-18 RX ORDER — PROPOFOL 10 MG/ML
INJECTION, EMULSION INTRAVENOUS AS NEEDED
Status: DISCONTINUED | OUTPATIENT
Start: 2019-04-18 | End: 2019-04-18 | Stop reason: SURG

## 2019-04-18 RX ADMIN — LABETALOL 20 MG/4 ML (5 MG/ML) INTRAVENOUS SYRINGE 10 MG: at 08:29

## 2019-04-18 RX ADMIN — LABETALOL 20 MG/4 ML (5 MG/ML) INTRAVENOUS SYRINGE: at 07:47

## 2019-04-18 RX ADMIN — PROPOFOL 200 MG: 10 INJECTION, EMULSION INTRAVENOUS at 08:25

## 2019-04-18 RX ADMIN — SODIUM CHLORIDE: 0.9 INJECTION, SOLUTION INTRAVENOUS at 08:20

## 2019-05-09 LAB — GLUCOSE SERPL-MCNC: 176 MG/DL (ref 65–140)

## 2019-06-13 ENCOUNTER — TELEPHONE (OUTPATIENT)
Dept: GASTROENTEROLOGY | Facility: MEDICAL CENTER | Age: 54
End: 2019-06-13

## 2021-12-16 ENCOUNTER — APPOINTMENT (OUTPATIENT)
Dept: RADIOLOGY | Facility: HOSPITAL | Age: 56
DRG: 282 | End: 2021-12-16
Payer: COMMERCIAL

## 2021-12-16 ENCOUNTER — HOSPITAL ENCOUNTER (INPATIENT)
Facility: HOSPITAL | Age: 56
LOS: 6 days | Discharge: NURSING, REHAB OR LONG-TERM CARE | DRG: 282 | End: 2021-12-25
Attending: EMERGENCY MEDICINE | Admitting: SURGERY
Payer: COMMERCIAL

## 2021-12-16 ENCOUNTER — APPOINTMENT (EMERGENCY)
Dept: RADIOLOGY | Facility: HOSPITAL | Age: 56
DRG: 282 | End: 2021-12-16
Payer: COMMERCIAL

## 2021-12-16 DIAGNOSIS — K85.90 PANCREATITIS: ICD-10-CM

## 2021-12-16 DIAGNOSIS — K85.90 ACUTE PANCREATITIS, UNSPECIFIED COMPLICATION STATUS, UNSPECIFIED PANCREATITIS TYPE: Primary | ICD-10-CM

## 2021-12-16 DIAGNOSIS — R10.13 EPIGASTRIC PAIN: ICD-10-CM

## 2021-12-16 LAB
ALBUMIN SERPL BCP-MCNC: 2.6 G/DL (ref 3.5–5)
ALP SERPL-CCNC: 142 U/L (ref 46–116)
ALT SERPL W P-5'-P-CCNC: 58 U/L (ref 12–78)
ANION GAP SERPL CALCULATED.3IONS-SCNC: 5 MMOL/L (ref 4–13)
AST SERPL W P-5'-P-CCNC: 30 U/L (ref 5–45)
BASOPHILS # BLD AUTO: 0.07 THOUSANDS/ΜL (ref 0–0.1)
BASOPHILS NFR BLD AUTO: 1 % (ref 0–1)
BILIRUB SERPL-MCNC: 0.62 MG/DL (ref 0.2–1)
BUN SERPL-MCNC: 10 MG/DL (ref 5–25)
CALCIUM ALBUM COR SERPL-MCNC: 10.6 MG/DL (ref 8.3–10.1)
CALCIUM SERPL-MCNC: 9.5 MG/DL (ref 8.3–10.1)
CHLORIDE SERPL-SCNC: 103 MMOL/L (ref 100–108)
CO2 SERPL-SCNC: 28 MMOL/L (ref 21–32)
CREAT SERPL-MCNC: 0.78 MG/DL (ref 0.6–1.3)
EOSINOPHIL # BLD AUTO: 0.46 THOUSAND/ΜL (ref 0–0.61)
EOSINOPHIL NFR BLD AUTO: 5 % (ref 0–6)
ERYTHROCYTE [DISTWIDTH] IN BLOOD BY AUTOMATED COUNT: 12.8 % (ref 11.6–15.1)
GFR SERPL CREATININE-BSD FRML MDRD: 99 ML/MIN/1.73SQ M
GLUCOSE SERPL-MCNC: 125 MG/DL (ref 65–140)
HCT VFR BLD AUTO: 41 % (ref 36.5–49.3)
HGB BLD-MCNC: 13.9 G/DL (ref 12–17)
IMM GRANULOCYTES # BLD AUTO: 0.14 THOUSAND/UL (ref 0–0.2)
IMM GRANULOCYTES NFR BLD AUTO: 2 % (ref 0–2)
LIPASE SERPL-CCNC: 113 U/L (ref 73–393)
LYMPHOCYTES # BLD AUTO: 1.86 THOUSANDS/ΜL (ref 0.6–4.47)
LYMPHOCYTES NFR BLD AUTO: 20 % (ref 14–44)
MCH RBC QN AUTO: 30.3 PG (ref 26.8–34.3)
MCHC RBC AUTO-ENTMCNC: 33.9 G/DL (ref 31.4–37.4)
MCV RBC AUTO: 90 FL (ref 82–98)
MONOCYTES # BLD AUTO: 1 THOUSAND/ΜL (ref 0.17–1.22)
MONOCYTES NFR BLD AUTO: 11 % (ref 4–12)
NEUTROPHILS # BLD AUTO: 5.62 THOUSANDS/ΜL (ref 1.85–7.62)
NEUTS SEG NFR BLD AUTO: 61 % (ref 43–75)
NRBC BLD AUTO-RTO: 0 /100 WBCS
PLATELET # BLD AUTO: 403 THOUSANDS/UL (ref 149–390)
PMV BLD AUTO: 9 FL (ref 8.9–12.7)
POTASSIUM SERPL-SCNC: 3.5 MMOL/L (ref 3.5–5.3)
PROT SERPL-MCNC: 6.5 G/DL (ref 6.4–8.2)
RBC # BLD AUTO: 4.58 MILLION/UL (ref 3.88–5.62)
SODIUM SERPL-SCNC: 136 MMOL/L (ref 136–145)
WBC # BLD AUTO: 9.15 THOUSAND/UL (ref 4.31–10.16)

## 2021-12-16 PROCEDURE — 99285 EMERGENCY DEPT VISIT HI MDM: CPT

## 2021-12-16 PROCEDURE — 96376 TX/PRO/DX INJ SAME DRUG ADON: CPT

## 2021-12-16 PROCEDURE — 85025 COMPLETE CBC W/AUTO DIFF WBC: CPT | Performed by: EMERGENCY MEDICINE

## 2021-12-16 PROCEDURE — 83690 ASSAY OF LIPASE: CPT | Performed by: EMERGENCY MEDICINE

## 2021-12-16 PROCEDURE — 80053 COMPREHEN METABOLIC PANEL: CPT | Performed by: EMERGENCY MEDICINE

## 2021-12-16 PROCEDURE — 96375 TX/PRO/DX INJ NEW DRUG ADDON: CPT

## 2021-12-16 PROCEDURE — 76705 ECHO EXAM OF ABDOMEN: CPT

## 2021-12-16 PROCEDURE — 99220 PR INITIAL OBSERVATION CARE/DAY 70 MINUTES: CPT | Performed by: SURGERY

## 2021-12-16 PROCEDURE — 36415 COLL VENOUS BLD VENIPUNCTURE: CPT | Performed by: EMERGENCY MEDICINE

## 2021-12-16 PROCEDURE — 96374 THER/PROPH/DIAG INJ IV PUSH: CPT

## 2021-12-16 PROCEDURE — 99285 EMERGENCY DEPT VISIT HI MDM: CPT | Performed by: EMERGENCY MEDICINE

## 2021-12-16 PROCEDURE — G1004 CDSM NDSC: HCPCS

## 2021-12-16 PROCEDURE — 74177 CT ABD & PELVIS W/CONTRAST: CPT

## 2021-12-16 RX ORDER — KETOROLAC TROMETHAMINE 30 MG/ML
15 INJECTION, SOLUTION INTRAMUSCULAR; INTRAVENOUS ONCE
Status: COMPLETED | OUTPATIENT
Start: 2021-12-16 | End: 2021-12-16

## 2021-12-16 RX ORDER — ATORVASTATIN CALCIUM 80 MG/1
80 TABLET, FILM COATED ORAL
Status: DISCONTINUED | OUTPATIENT
Start: 2021-12-16 | End: 2021-12-25 | Stop reason: HOSPADM

## 2021-12-16 RX ORDER — HEPARIN SODIUM 10000 [USP'U]/100ML
3-30 INJECTION, SOLUTION INTRAVENOUS
Status: DISPENSED | OUTPATIENT
Start: 2021-12-16 | End: 2021-12-22

## 2021-12-16 RX ORDER — ASPIRIN 81 MG/1
81 TABLET ORAL DAILY
Status: DISCONTINUED | OUTPATIENT
Start: 2021-12-17 | End: 2021-12-25 | Stop reason: HOSPADM

## 2021-12-16 RX ORDER — MIRTAZAPINE 15 MG/1
15 TABLET, FILM COATED ORAL
Status: DISCONTINUED | OUTPATIENT
Start: 2021-12-16 | End: 2021-12-25 | Stop reason: HOSPADM

## 2021-12-16 RX ORDER — HYDROMORPHONE HCL/PF 1 MG/ML
0.5 SYRINGE (ML) INJECTION ONCE
Status: COMPLETED | OUTPATIENT
Start: 2021-12-16 | End: 2021-12-16

## 2021-12-16 RX ORDER — CARVEDILOL 25 MG/1
50 TABLET ORAL 2 TIMES DAILY WITH MEALS
Status: DISCONTINUED | OUTPATIENT
Start: 2021-12-17 | End: 2021-12-25 | Stop reason: HOSPADM

## 2021-12-16 RX ORDER — SODIUM CHLORIDE, SODIUM LACTATE, POTASSIUM CHLORIDE, CALCIUM CHLORIDE 600; 310; 30; 20 MG/100ML; MG/100ML; MG/100ML; MG/100ML
100 INJECTION, SOLUTION INTRAVENOUS CONTINUOUS
Status: DISCONTINUED | OUTPATIENT
Start: 2021-12-16 | End: 2021-12-19

## 2021-12-16 RX ORDER — PANTOPRAZOLE SODIUM 40 MG/1
40 TABLET, DELAYED RELEASE ORAL DAILY
Status: DISCONTINUED | OUTPATIENT
Start: 2021-12-17 | End: 2021-12-25 | Stop reason: HOSPADM

## 2021-12-16 RX ADMIN — HYDROMORPHONE HYDROCHLORIDE 0.5 MG: 1 INJECTION, SOLUTION INTRAMUSCULAR; INTRAVENOUS; SUBCUTANEOUS at 14:21

## 2021-12-16 RX ADMIN — IOHEXOL 100 ML: 350 INJECTION, SOLUTION INTRAVENOUS at 17:33

## 2021-12-16 RX ADMIN — KETOROLAC TROMETHAMINE 15 MG: 30 INJECTION, SOLUTION INTRAMUSCULAR at 16:32

## 2021-12-16 RX ADMIN — HYDROMORPHONE HYDROCHLORIDE 0.5 MG: 1 INJECTION, SOLUTION INTRAMUSCULAR; INTRAVENOUS; SUBCUTANEOUS at 18:34

## 2021-12-17 ENCOUNTER — APPOINTMENT (OUTPATIENT)
Dept: RADIOLOGY | Facility: HOSPITAL | Age: 56
DRG: 282 | End: 2021-12-17
Payer: COMMERCIAL

## 2021-12-17 LAB
ALBUMIN SERPL BCP-MCNC: 2.8 G/DL (ref 3.5–5)
ALP SERPL-CCNC: 137 U/L (ref 46–116)
ALT SERPL W P-5'-P-CCNC: 57 U/L (ref 12–78)
ANION GAP SERPL CALCULATED.3IONS-SCNC: 7 MMOL/L (ref 4–13)
APTT PPP: 80 SECONDS (ref 23–37)
APTT PPP: 82 SECONDS (ref 23–37)
AST SERPL W P-5'-P-CCNC: 33 U/L (ref 5–45)
BASOPHILS # BLD AUTO: 0.07 THOUSANDS/ΜL (ref 0–0.1)
BASOPHILS NFR BLD AUTO: 1 % (ref 0–1)
BILIRUB SERPL-MCNC: 0.81 MG/DL (ref 0.2–1)
BUN SERPL-MCNC: 14 MG/DL (ref 5–25)
CALCIUM ALBUM COR SERPL-MCNC: 10.4 MG/DL (ref 8.3–10.1)
CALCIUM SERPL-MCNC: 9.4 MG/DL (ref 8.3–10.1)
CHLORIDE SERPL-SCNC: 104 MMOL/L (ref 100–108)
CHOLEST SERPL-MCNC: 89 MG/DL
CO2 SERPL-SCNC: 27 MMOL/L (ref 21–32)
CREAT SERPL-MCNC: 0.85 MG/DL (ref 0.6–1.3)
EOSINOPHIL # BLD AUTO: 0.55 THOUSAND/ΜL (ref 0–0.61)
EOSINOPHIL NFR BLD AUTO: 7 % (ref 0–6)
ERYTHROCYTE [DISTWIDTH] IN BLOOD BY AUTOMATED COUNT: 12.7 % (ref 11.6–15.1)
FLUAV RNA RESP QL NAA+PROBE: NEGATIVE
FLUBV RNA RESP QL NAA+PROBE: NEGATIVE
GFR SERPL CREATININE-BSD FRML MDRD: 96 ML/MIN/1.73SQ M
GLUCOSE P FAST SERPL-MCNC: 108 MG/DL (ref 65–99)
GLUCOSE SERPL-MCNC: 103 MG/DL (ref 65–140)
GLUCOSE SERPL-MCNC: 108 MG/DL (ref 65–140)
GLUCOSE SERPL-MCNC: 122 MG/DL (ref 65–140)
GLUCOSE SERPL-MCNC: 131 MG/DL (ref 65–140)
HCT VFR BLD AUTO: 39.6 % (ref 36.5–49.3)
HDLC SERPL-MCNC: 22 MG/DL
HGB BLD-MCNC: 13.3 G/DL (ref 12–17)
IMM GRANULOCYTES # BLD AUTO: 0.19 THOUSAND/UL (ref 0–0.2)
IMM GRANULOCYTES NFR BLD AUTO: 2 % (ref 0–2)
INR PPP: 1.1 (ref 0.84–1.19)
LDLC SERPL CALC-MCNC: 39 MG/DL (ref 0–100)
LYMPHOCYTES # BLD AUTO: 2.13 THOUSANDS/ΜL (ref 0.6–4.47)
LYMPHOCYTES NFR BLD AUTO: 26 % (ref 14–44)
MCH RBC QN AUTO: 30.4 PG (ref 26.8–34.3)
MCHC RBC AUTO-ENTMCNC: 33.6 G/DL (ref 31.4–37.4)
MCV RBC AUTO: 91 FL (ref 82–98)
MONOCYTES # BLD AUTO: 0.94 THOUSAND/ΜL (ref 0.17–1.22)
MONOCYTES NFR BLD AUTO: 12 % (ref 4–12)
NEUTROPHILS # BLD AUTO: 4.23 THOUSANDS/ΜL (ref 1.85–7.62)
NEUTS SEG NFR BLD AUTO: 52 % (ref 43–75)
NONHDLC SERPL-MCNC: 67 MG/DL
NRBC BLD AUTO-RTO: 0 /100 WBCS
PLATELET # BLD AUTO: 394 THOUSANDS/UL (ref 149–390)
PMV BLD AUTO: 9.4 FL (ref 8.9–12.7)
POTASSIUM SERPL-SCNC: 3.5 MMOL/L (ref 3.5–5.3)
PROT SERPL-MCNC: 6.7 G/DL (ref 6.4–8.2)
PROTHROMBIN TIME: 13.8 SECONDS (ref 11.6–14.5)
RBC # BLD AUTO: 4.37 MILLION/UL (ref 3.88–5.62)
RSV RNA RESP QL NAA+PROBE: NEGATIVE
SARS-COV-2 RNA RESP QL NAA+PROBE: NEGATIVE
SODIUM SERPL-SCNC: 138 MMOL/L (ref 136–145)
TRIGL SERPL-MCNC: 139 MG/DL
WBC # BLD AUTO: 8.11 THOUSAND/UL (ref 4.31–10.16)

## 2021-12-17 PROCEDURE — 0241U HB NFCT DS VIR RESP RNA 4 TRGT: CPT | Performed by: NURSE PRACTITIONER

## 2021-12-17 PROCEDURE — 99214 OFFICE O/P EST MOD 30 MIN: CPT | Performed by: STUDENT IN AN ORGANIZED HEALTH CARE EDUCATION/TRAINING PROGRAM

## 2021-12-17 PROCEDURE — 85730 THROMBOPLASTIN TIME PARTIAL: CPT | Performed by: SURGERY

## 2021-12-17 PROCEDURE — 99245 OFF/OP CONSLTJ NEW/EST HI 55: CPT | Performed by: INTERNAL MEDICINE

## 2021-12-17 PROCEDURE — 82948 REAGENT STRIP/BLOOD GLUCOSE: CPT

## 2021-12-17 PROCEDURE — 85610 PROTHROMBIN TIME: CPT | Performed by: SURGERY

## 2021-12-17 PROCEDURE — 80053 COMPREHEN METABOLIC PANEL: CPT | Performed by: SURGERY

## 2021-12-17 PROCEDURE — A9585 GADOBUTROL INJECTION: HCPCS | Performed by: NURSE PRACTITIONER

## 2021-12-17 PROCEDURE — 36415 COLL VENOUS BLD VENIPUNCTURE: CPT | Performed by: SURGERY

## 2021-12-17 PROCEDURE — 85025 COMPLETE CBC W/AUTO DIFF WBC: CPT | Performed by: SURGERY

## 2021-12-17 PROCEDURE — 99222 1ST HOSP IP/OBS MODERATE 55: CPT | Performed by: INTERNAL MEDICINE

## 2021-12-17 PROCEDURE — 74183 MRI ABD W/O CNTR FLWD CNTR: CPT

## 2021-12-17 PROCEDURE — 80061 LIPID PANEL: CPT | Performed by: SURGERY

## 2021-12-17 PROCEDURE — G1004 CDSM NDSC: HCPCS

## 2021-12-17 RX ORDER — ACETAMINOPHEN 325 MG/1
975 TABLET ORAL EVERY 8 HOURS SCHEDULED
Status: DISCONTINUED | OUTPATIENT
Start: 2021-12-17 | End: 2021-12-25 | Stop reason: HOSPADM

## 2021-12-17 RX ORDER — OXYCODONE HYDROCHLORIDE 10 MG/1
10 TABLET ORAL EVERY 4 HOURS PRN
Status: DISCONTINUED | OUTPATIENT
Start: 2021-12-17 | End: 2021-12-21

## 2021-12-17 RX ORDER — METHOCARBAMOL 750 MG/1
750 TABLET, FILM COATED ORAL EVERY 6 HOURS SCHEDULED
Status: DISPENSED | OUTPATIENT
Start: 2021-12-17 | End: 2021-12-24

## 2021-12-17 RX ORDER — ACETAMINOPHEN 325 MG/1
650 TABLET ORAL EVERY 6 HOURS PRN
Status: DISCONTINUED | OUTPATIENT
Start: 2021-12-17 | End: 2021-12-21

## 2021-12-17 RX ORDER — HYDROMORPHONE HCL IN WATER/PF 6 MG/30 ML
0.2 PATIENT CONTROLLED ANALGESIA SYRINGE INTRAVENOUS
Status: DISCONTINUED | OUTPATIENT
Start: 2021-12-17 | End: 2021-12-21

## 2021-12-17 RX ORDER — OXYCODONE HYDROCHLORIDE 5 MG/1
5 TABLET ORAL EVERY 4 HOURS PRN
Status: DISCONTINUED | OUTPATIENT
Start: 2021-12-17 | End: 2021-12-21

## 2021-12-17 RX ADMIN — ACETAMINOPHEN 975 MG: 325 TABLET, FILM COATED ORAL at 21:28

## 2021-12-17 RX ADMIN — HEPARIN SODIUM 18 UNITS/KG/HR: 10000 INJECTION, SOLUTION INTRAVENOUS at 12:50

## 2021-12-17 RX ADMIN — MIRTAZAPINE 15 MG: 15 TABLET, FILM COATED ORAL at 21:29

## 2021-12-17 RX ADMIN — HYDROMORPHONE HYDROCHLORIDE 0.2 MG: 0.2 INJECTION, SOLUTION INTRAMUSCULAR; INTRAVENOUS; SUBCUTANEOUS at 04:22

## 2021-12-17 RX ADMIN — MIRTAZAPINE 15 MG: 15 TABLET, FILM COATED ORAL at 00:12

## 2021-12-17 RX ADMIN — CARVEDILOL 50 MG: 25 TABLET, FILM COATED ORAL at 08:03

## 2021-12-17 RX ADMIN — GADOBUTROL 10 ML: 604.72 INJECTION INTRAVENOUS at 20:23

## 2021-12-17 RX ADMIN — METHOCARBAMOL TABLETS 750 MG: 750 TABLET, COATED ORAL at 18:09

## 2021-12-17 RX ADMIN — HYDROMORPHONE HYDROCHLORIDE 0.2 MG: 0.2 INJECTION, SOLUTION INTRAMUSCULAR; INTRAVENOUS; SUBCUTANEOUS at 08:08

## 2021-12-17 RX ADMIN — OXYCODONE HYDROCHLORIDE 10 MG: 10 TABLET ORAL at 09:49

## 2021-12-17 RX ADMIN — OXYCODONE HYDROCHLORIDE 10 MG: 10 TABLET ORAL at 21:29

## 2021-12-17 RX ADMIN — PANTOPRAZOLE SODIUM 40 MG: 40 TABLET, DELAYED RELEASE ORAL at 08:04

## 2021-12-17 RX ADMIN — OXYCODONE HYDROCHLORIDE 5 MG: 5 TABLET ORAL at 12:57

## 2021-12-17 RX ADMIN — HYDROMORPHONE HYDROCHLORIDE 0.2 MG: 0.2 INJECTION, SOLUTION INTRAMUSCULAR; INTRAVENOUS; SUBCUTANEOUS at 11:45

## 2021-12-17 RX ADMIN — HYDROMORPHONE HYDROCHLORIDE 0.2 MG: 0.2 INJECTION, SOLUTION INTRAMUSCULAR; INTRAVENOUS; SUBCUTANEOUS at 15:04

## 2021-12-17 RX ADMIN — ACETAMINOPHEN 975 MG: 325 TABLET, FILM COATED ORAL at 14:58

## 2021-12-17 RX ADMIN — SODIUM CHLORIDE, SODIUM LACTATE, POTASSIUM CHLORIDE, AND CALCIUM CHLORIDE 150 ML/HR: .6; .31; .03; .02 INJECTION, SOLUTION INTRAVENOUS at 23:32

## 2021-12-17 RX ADMIN — CARVEDILOL 50 MG: 25 TABLET, FILM COATED ORAL at 16:47

## 2021-12-17 RX ADMIN — METHOCARBAMOL TABLETS 750 MG: 750 TABLET, COATED ORAL at 12:57

## 2021-12-17 RX ADMIN — HEPARIN SODIUM 18 UNITS/KG/HR: 10000 INJECTION, SOLUTION INTRAVENOUS at 00:00

## 2021-12-17 RX ADMIN — ATORVASTATIN CALCIUM 80 MG: 80 TABLET, FILM COATED ORAL at 21:29

## 2021-12-17 RX ADMIN — SODIUM CHLORIDE, SODIUM LACTATE, POTASSIUM CHLORIDE, AND CALCIUM CHLORIDE 150 ML/HR: .6; .31; .03; .02 INJECTION, SOLUTION INTRAVENOUS at 00:11

## 2021-12-17 RX ADMIN — OXYCODONE HYDROCHLORIDE 10 MG: 10 TABLET ORAL at 16:58

## 2021-12-17 RX ADMIN — ASPIRIN 81 MG: 81 TABLET, COATED ORAL at 08:04

## 2021-12-18 LAB
ALBUMIN SERPL BCP-MCNC: 2.4 G/DL (ref 3.5–5)
ALP SERPL-CCNC: 118 U/L (ref 46–116)
ALT SERPL W P-5'-P-CCNC: 43 U/L (ref 12–78)
ANION GAP SERPL CALCULATED.3IONS-SCNC: 5 MMOL/L (ref 4–13)
APTT PPP: 61 SECONDS (ref 23–37)
AST SERPL W P-5'-P-CCNC: 23 U/L (ref 5–45)
BILIRUB SERPL-MCNC: 0.5 MG/DL (ref 0.2–1)
BUN SERPL-MCNC: 12 MG/DL (ref 5–25)
CALCIUM ALBUM COR SERPL-MCNC: 10 MG/DL (ref 8.3–10.1)
CALCIUM SERPL-MCNC: 8.7 MG/DL (ref 8.3–10.1)
CHLORIDE SERPL-SCNC: 104 MMOL/L (ref 100–108)
CO2 SERPL-SCNC: 26 MMOL/L (ref 21–32)
CREAT SERPL-MCNC: 0.8 MG/DL (ref 0.6–1.3)
GFR SERPL CREATININE-BSD FRML MDRD: 98 ML/MIN/1.73SQ M
GLUCOSE SERPL-MCNC: 101 MG/DL (ref 65–140)
GLUCOSE SERPL-MCNC: 123 MG/DL (ref 65–140)
GLUCOSE SERPL-MCNC: 171 MG/DL (ref 65–140)
GLUCOSE SERPL-MCNC: 171 MG/DL (ref 65–140)
GLUCOSE SERPL-MCNC: 85 MG/DL (ref 65–140)
GLUCOSE SERPL-MCNC: 96 MG/DL (ref 65–140)
POTASSIUM SERPL-SCNC: 3.7 MMOL/L (ref 3.5–5.3)
PROT SERPL-MCNC: 5.6 G/DL (ref 6.4–8.2)
SODIUM SERPL-SCNC: 135 MMOL/L (ref 136–145)

## 2021-12-18 PROCEDURE — 80053 COMPREHEN METABOLIC PANEL: CPT | Performed by: STUDENT IN AN ORGANIZED HEALTH CARE EDUCATION/TRAINING PROGRAM

## 2021-12-18 PROCEDURE — 82948 REAGENT STRIP/BLOOD GLUCOSE: CPT

## 2021-12-18 PROCEDURE — 82787 IGG 1 2 3 OR 4 EACH: CPT | Performed by: STUDENT IN AN ORGANIZED HEALTH CARE EDUCATION/TRAINING PROGRAM

## 2021-12-18 PROCEDURE — 82784 ASSAY IGA/IGD/IGG/IGM EACH: CPT | Performed by: STUDENT IN AN ORGANIZED HEALTH CARE EDUCATION/TRAINING PROGRAM

## 2021-12-18 PROCEDURE — 99232 SBSQ HOSP IP/OBS MODERATE 35: CPT | Performed by: SURGERY

## 2021-12-18 PROCEDURE — 85730 THROMBOPLASTIN TIME PARTIAL: CPT | Performed by: SURGERY

## 2021-12-18 RX ORDER — POTASSIUM CHLORIDE 20 MEQ/1
40 TABLET, EXTENDED RELEASE ORAL ONCE
Status: COMPLETED | OUTPATIENT
Start: 2021-12-18 | End: 2021-12-18

## 2021-12-18 RX ADMIN — HYDROMORPHONE HYDROCHLORIDE 0.2 MG: 0.2 INJECTION, SOLUTION INTRAMUSCULAR; INTRAVENOUS; SUBCUTANEOUS at 12:05

## 2021-12-18 RX ADMIN — OXYCODONE HYDROCHLORIDE 10 MG: 10 TABLET ORAL at 17:31

## 2021-12-18 RX ADMIN — METHOCARBAMOL TABLETS 750 MG: 750 TABLET, COATED ORAL at 15:09

## 2021-12-18 RX ADMIN — METHOCARBAMOL TABLETS 750 MG: 750 TABLET, COATED ORAL at 07:43

## 2021-12-18 RX ADMIN — ASPIRIN 81 MG: 81 TABLET, COATED ORAL at 08:49

## 2021-12-18 RX ADMIN — HEPARIN SODIUM 18 UNITS/KG/HR: 10000 INJECTION, SOLUTION INTRAVENOUS at 17:32

## 2021-12-18 RX ADMIN — ACETAMINOPHEN 975 MG: 325 TABLET, FILM COATED ORAL at 06:34

## 2021-12-18 RX ADMIN — POTASSIUM CHLORIDE 40 MEQ: 1500 TABLET, EXTENDED RELEASE ORAL at 15:09

## 2021-12-18 RX ADMIN — HEPARIN SODIUM 18 UNITS/KG/HR: 10000 INJECTION, SOLUTION INTRAVENOUS at 04:31

## 2021-12-18 RX ADMIN — CARVEDILOL 50 MG: 25 TABLET, FILM COATED ORAL at 08:49

## 2021-12-18 RX ADMIN — OXYCODONE HYDROCHLORIDE 10 MG: 10 TABLET ORAL at 13:43

## 2021-12-18 RX ADMIN — CARVEDILOL 50 MG: 25 TABLET, FILM COATED ORAL at 17:31

## 2021-12-18 RX ADMIN — PANTOPRAZOLE SODIUM 40 MG: 40 TABLET, DELAYED RELEASE ORAL at 08:49

## 2021-12-18 RX ADMIN — HYDROMORPHONE HYDROCHLORIDE 0.2 MG: 0.2 INJECTION, SOLUTION INTRAMUSCULAR; INTRAVENOUS; SUBCUTANEOUS at 15:10

## 2021-12-18 RX ADMIN — OXYCODONE HYDROCHLORIDE 10 MG: 10 TABLET ORAL at 08:49

## 2021-12-18 RX ADMIN — OXYCODONE HYDROCHLORIDE 10 MG: 10 TABLET ORAL at 21:36

## 2021-12-18 RX ADMIN — ACETAMINOPHEN 975 MG: 325 TABLET, FILM COATED ORAL at 21:37

## 2021-12-18 RX ADMIN — HYDROMORPHONE HYDROCHLORIDE 0.2 MG: 0.2 INJECTION, SOLUTION INTRAMUSCULAR; INTRAVENOUS; SUBCUTANEOUS at 22:43

## 2021-12-18 RX ADMIN — HYDROMORPHONE HYDROCHLORIDE 0.2 MG: 0.2 INJECTION, SOLUTION INTRAMUSCULAR; INTRAVENOUS; SUBCUTANEOUS at 19:34

## 2021-12-18 RX ADMIN — ACETAMINOPHEN 975 MG: 325 TABLET, FILM COATED ORAL at 13:42

## 2021-12-18 RX ADMIN — MIRTAZAPINE 15 MG: 15 TABLET, FILM COATED ORAL at 21:37

## 2021-12-18 RX ADMIN — ATORVASTATIN CALCIUM 80 MG: 80 TABLET, FILM COATED ORAL at 21:36

## 2021-12-18 RX ADMIN — SODIUM CHLORIDE, SODIUM LACTATE, POTASSIUM CHLORIDE, AND CALCIUM CHLORIDE 100 ML/HR: .6; .31; .03; .02 INJECTION, SOLUTION INTRAVENOUS at 17:32

## 2021-12-18 RX ADMIN — INSULIN LISPRO 1 UNITS: 100 INJECTION, SOLUTION INTRAVENOUS; SUBCUTANEOUS at 11:56

## 2021-12-18 RX ADMIN — METHOCARBAMOL TABLETS 750 MG: 750 TABLET, COATED ORAL at 21:37

## 2021-12-19 LAB
ALBUMIN SERPL BCP-MCNC: 1.1 G/DL (ref 3.5–5)
ALP SERPL-CCNC: 56 U/L (ref 46–116)
ALT SERPL W P-5'-P-CCNC: 17 U/L (ref 12–78)
ANION GAP SERPL CALCULATED.3IONS-SCNC: 14 MMOL/L (ref 4–13)
APTT PPP: >210 SECONDS (ref 23–37)
APTT PPP: >210 SECONDS (ref 23–37)
AST SERPL W P-5'-P-CCNC: 10 U/L (ref 5–45)
BASOPHILS # BLD MANUAL: 0 THOUSAND/UL (ref 0–0.1)
BASOPHILS NFR MAR MANUAL: 0 % (ref 0–1)
BILIRUB SERPL-MCNC: 0.23 MG/DL (ref 0.2–1)
BUN SERPL-MCNC: 5 MG/DL (ref 5–25)
CALCIUM ALBUM COR SERPL-MCNC: 9.4 MG/DL (ref 8.3–10.1)
CALCIUM SERPL-MCNC: 7.1 MG/DL (ref 8.3–10.1)
CHLORIDE SERPL-SCNC: 105 MMOL/L (ref 100–108)
CO2 SERPL-SCNC: 17 MMOL/L (ref 21–32)
CREAT SERPL-MCNC: 0.21 MG/DL (ref 0.6–1.3)
EOSINOPHIL # BLD MANUAL: 0.05 THOUSAND/UL (ref 0–0.4)
EOSINOPHIL NFR BLD MANUAL: 1 % (ref 0–6)
ERYTHROCYTE [DISTWIDTH] IN BLOOD BY AUTOMATED COUNT: 12.4 % (ref 11.6–15.1)
GFR SERPL CREATININE-BSD FRML MDRD: 170 ML/MIN/1.73SQ M
GLUCOSE SERPL-MCNC: 111 MG/DL (ref 65–140)
GLUCOSE SERPL-MCNC: 114 MG/DL (ref 65–140)
GLUCOSE SERPL-MCNC: 128 MG/DL (ref 65–140)
GLUCOSE SERPL-MCNC: 144 MG/DL (ref 65–140)
GLUCOSE SERPL-MCNC: 61 MG/DL (ref 65–140)
HCT VFR BLD AUTO: 29 % (ref 36.5–49.3)
HGB BLD-MCNC: 9.6 G/DL (ref 12–17)
LYMPHOCYTES # BLD AUTO: 1.03 THOUSAND/UL (ref 0.6–4.47)
LYMPHOCYTES # BLD AUTO: 21 % (ref 14–44)
MCH RBC QN AUTO: 30.9 PG (ref 26.8–34.3)
MCHC RBC AUTO-ENTMCNC: 33.1 G/DL (ref 31.4–37.4)
MCV RBC AUTO: 93 FL (ref 82–98)
MONOCYTES # BLD AUTO: 0.29 THOUSAND/UL (ref 0–1.22)
MONOCYTES NFR BLD: 6 % (ref 4–12)
NEUTROPHILS # BLD MANUAL: 3.52 THOUSAND/UL (ref 1.85–7.62)
NEUTS SEG NFR BLD AUTO: 72 % (ref 43–75)
PLATELET # BLD AUTO: 256 THOUSANDS/UL (ref 149–390)
PLATELET BLD QL SMEAR: ADEQUATE
PMV BLD AUTO: 9.3 FL (ref 8.9–12.7)
POTASSIUM SERPL-SCNC: 3.5 MMOL/L (ref 3.5–5.3)
PROT SERPL-MCNC: 2.8 G/DL (ref 6.4–8.2)
RBC # BLD AUTO: 3.11 MILLION/UL (ref 3.88–5.62)
RBC MORPH BLD: NORMAL
SODIUM SERPL-SCNC: 136 MMOL/L (ref 136–145)
WBC # BLD AUTO: 4.89 THOUSAND/UL (ref 4.31–10.16)

## 2021-12-19 PROCEDURE — 82948 REAGENT STRIP/BLOOD GLUCOSE: CPT

## 2021-12-19 PROCEDURE — 85027 COMPLETE CBC AUTOMATED: CPT | Performed by: SURGERY

## 2021-12-19 PROCEDURE — 85730 THROMBOPLASTIN TIME PARTIAL: CPT | Performed by: SURGERY

## 2021-12-19 PROCEDURE — 99232 SBSQ HOSP IP/OBS MODERATE 35: CPT | Performed by: SURGERY

## 2021-12-19 PROCEDURE — 85730 THROMBOPLASTIN TIME PARTIAL: CPT | Performed by: INTERNAL MEDICINE

## 2021-12-19 PROCEDURE — 80053 COMPREHEN METABOLIC PANEL: CPT | Performed by: SURGERY

## 2021-12-19 PROCEDURE — 85007 BL SMEAR W/DIFF WBC COUNT: CPT | Performed by: SURGERY

## 2021-12-19 RX ORDER — SODIUM CHLORIDE, SODIUM LACTATE, POTASSIUM CHLORIDE, CALCIUM CHLORIDE 600; 310; 30; 20 MG/100ML; MG/100ML; MG/100ML; MG/100ML
75 INJECTION, SOLUTION INTRAVENOUS CONTINUOUS
Status: DISCONTINUED | OUTPATIENT
Start: 2021-12-19 | End: 2021-12-20

## 2021-12-19 RX ORDER — POTASSIUM CHLORIDE 20 MEQ/1
40 TABLET, EXTENDED RELEASE ORAL ONCE
Status: COMPLETED | OUTPATIENT
Start: 2021-12-19 | End: 2021-12-19

## 2021-12-19 RX ADMIN — OXYCODONE HYDROCHLORIDE 10 MG: 10 TABLET ORAL at 10:40

## 2021-12-19 RX ADMIN — CARVEDILOL 50 MG: 25 TABLET, FILM COATED ORAL at 08:28

## 2021-12-19 RX ADMIN — OXYCODONE HYDROCHLORIDE 10 MG: 10 TABLET ORAL at 17:59

## 2021-12-19 RX ADMIN — ACETAMINOPHEN 975 MG: 325 TABLET, FILM COATED ORAL at 21:37

## 2021-12-19 RX ADMIN — METHOCARBAMOL TABLETS 750 MG: 750 TABLET, COATED ORAL at 21:37

## 2021-12-19 RX ADMIN — ASPIRIN 81 MG: 81 TABLET, COATED ORAL at 08:27

## 2021-12-19 RX ADMIN — SODIUM CHLORIDE, SODIUM LACTATE, POTASSIUM CHLORIDE, AND CALCIUM CHLORIDE 100 ML/HR: .6; .31; .03; .02 INJECTION, SOLUTION INTRAVENOUS at 03:40

## 2021-12-19 RX ADMIN — HYDROMORPHONE HYDROCHLORIDE 0.2 MG: 0.2 INJECTION, SOLUTION INTRAMUSCULAR; INTRAVENOUS; SUBCUTANEOUS at 20:10

## 2021-12-19 RX ADMIN — CARVEDILOL 50 MG: 25 TABLET, FILM COATED ORAL at 17:59

## 2021-12-19 RX ADMIN — METHOCARBAMOL TABLETS 750 MG: 750 TABLET, COATED ORAL at 08:45

## 2021-12-19 RX ADMIN — PANTOPRAZOLE SODIUM 40 MG: 40 TABLET, DELAYED RELEASE ORAL at 08:27

## 2021-12-19 RX ADMIN — HYDROMORPHONE HYDROCHLORIDE 0.2 MG: 0.2 INJECTION, SOLUTION INTRAMUSCULAR; INTRAVENOUS; SUBCUTANEOUS at 12:43

## 2021-12-19 RX ADMIN — SODIUM CHLORIDE, SODIUM LACTATE, POTASSIUM CHLORIDE, AND CALCIUM CHLORIDE 75 ML/HR: .6; .31; .03; .02 INJECTION, SOLUTION INTRAVENOUS at 13:50

## 2021-12-19 RX ADMIN — MIRTAZAPINE 15 MG: 15 TABLET, FILM COATED ORAL at 21:37

## 2021-12-19 RX ADMIN — POTASSIUM CHLORIDE 40 MEQ: 1500 TABLET, EXTENDED RELEASE ORAL at 10:40

## 2021-12-19 RX ADMIN — SODIUM CHLORIDE, SODIUM LACTATE, POTASSIUM CHLORIDE, AND CALCIUM CHLORIDE 75 ML/HR: .6; .31; .03; .02 INJECTION, SOLUTION INTRAVENOUS at 08:29

## 2021-12-19 RX ADMIN — OXYCODONE HYDROCHLORIDE 10 MG: 10 TABLET ORAL at 01:45

## 2021-12-19 RX ADMIN — HYDROMORPHONE HYDROCHLORIDE 0.2 MG: 0.2 INJECTION, SOLUTION INTRAMUSCULAR; INTRAVENOUS; SUBCUTANEOUS at 08:45

## 2021-12-19 RX ADMIN — HEPARIN SODIUM 15 UNITS/KG/HR: 10000 INJECTION, SOLUTION INTRAVENOUS at 09:37

## 2021-12-19 RX ADMIN — METHOCARBAMOL TABLETS 750 MG: 750 TABLET, COATED ORAL at 03:44

## 2021-12-19 RX ADMIN — ACETAMINOPHEN 975 MG: 325 TABLET, FILM COATED ORAL at 05:52

## 2021-12-19 RX ADMIN — HYDROMORPHONE HYDROCHLORIDE 0.2 MG: 0.2 INJECTION, SOLUTION INTRAMUSCULAR; INTRAVENOUS; SUBCUTANEOUS at 03:45

## 2021-12-19 RX ADMIN — ACETAMINOPHEN 650 MG: 325 TABLET, FILM COATED ORAL at 00:21

## 2021-12-19 RX ADMIN — ATORVASTATIN CALCIUM 80 MG: 80 TABLET, FILM COATED ORAL at 21:37

## 2021-12-19 RX ADMIN — OXYCODONE HYDROCHLORIDE 10 MG: 10 TABLET ORAL at 05:55

## 2021-12-20 LAB
ALBUMIN SERPL BCP-MCNC: 2.4 G/DL (ref 3.5–5)
ALP SERPL-CCNC: 128 U/L (ref 46–116)
ALT SERPL W P-5'-P-CCNC: 33 U/L (ref 12–78)
ANION GAP SERPL CALCULATED.3IONS-SCNC: 4 MMOL/L (ref 4–13)
APTT PPP: 108 SECONDS (ref 23–37)
APTT PPP: 53 SECONDS (ref 23–37)
APTT PPP: 74 SECONDS (ref 23–37)
AST SERPL W P-5'-P-CCNC: 17 U/L (ref 5–45)
BILIRUB SERPL-MCNC: 0.42 MG/DL (ref 0.2–1)
BUN SERPL-MCNC: 9 MG/DL (ref 5–25)
CALCIUM ALBUM COR SERPL-MCNC: 10.2 MG/DL (ref 8.3–10.1)
CALCIUM SERPL-MCNC: 8.9 MG/DL (ref 8.3–10.1)
CHLORIDE SERPL-SCNC: 107 MMOL/L (ref 100–108)
CO2 SERPL-SCNC: 30 MMOL/L (ref 21–32)
CREAT SERPL-MCNC: 1.01 MG/DL (ref 0.6–1.3)
ERYTHROCYTE [DISTWIDTH] IN BLOOD BY AUTOMATED COUNT: 12.4 % (ref 11.6–15.1)
GFR SERPL CREATININE-BSD FRML MDRD: 81 ML/MIN/1.73SQ M
GLUCOSE SERPL-MCNC: 134 MG/DL (ref 65–140)
GLUCOSE SERPL-MCNC: 177 MG/DL (ref 65–140)
GLUCOSE SERPL-MCNC: 197 MG/DL (ref 65–140)
GLUCOSE SERPL-MCNC: 199 MG/DL (ref 65–140)
HCT VFR BLD AUTO: 38.4 % (ref 36.5–49.3)
HGB BLD-MCNC: 12.6 G/DL (ref 12–17)
INR PPP: 1.05 (ref 0.84–1.19)
MCH RBC QN AUTO: 30.2 PG (ref 26.8–34.3)
MCHC RBC AUTO-ENTMCNC: 32.8 G/DL (ref 31.4–37.4)
MCV RBC AUTO: 92 FL (ref 82–98)
PLATELET # BLD AUTO: 326 THOUSANDS/UL (ref 149–390)
PMV BLD AUTO: 9.1 FL (ref 8.9–12.7)
POTASSIUM SERPL-SCNC: 3.6 MMOL/L (ref 3.5–5.3)
PROT SERPL-MCNC: 5.6 G/DL (ref 6.4–8.2)
PROTHROMBIN TIME: 13.3 SECONDS (ref 11.6–14.5)
RBC # BLD AUTO: 4.17 MILLION/UL (ref 3.88–5.62)
SODIUM SERPL-SCNC: 141 MMOL/L (ref 136–145)
WBC # BLD AUTO: 6.84 THOUSAND/UL (ref 4.31–10.16)

## 2021-12-20 PROCEDURE — 99232 SBSQ HOSP IP/OBS MODERATE 35: CPT | Performed by: SURGERY

## 2021-12-20 PROCEDURE — 85027 COMPLETE CBC AUTOMATED: CPT | Performed by: SURGERY

## 2021-12-20 PROCEDURE — 85730 THROMBOPLASTIN TIME PARTIAL: CPT | Performed by: SURGERY

## 2021-12-20 PROCEDURE — 85610 PROTHROMBIN TIME: CPT | Performed by: SURGERY

## 2021-12-20 PROCEDURE — 82948 REAGENT STRIP/BLOOD GLUCOSE: CPT

## 2021-12-20 PROCEDURE — 85730 THROMBOPLASTIN TIME PARTIAL: CPT | Performed by: INTERNAL MEDICINE

## 2021-12-20 PROCEDURE — 80053 COMPREHEN METABOLIC PANEL: CPT | Performed by: SURGERY

## 2021-12-20 PROCEDURE — 99232 SBSQ HOSP IP/OBS MODERATE 35: CPT | Performed by: INTERNAL MEDICINE

## 2021-12-20 RX ORDER — POTASSIUM CHLORIDE 20 MEQ/1
40 TABLET, EXTENDED RELEASE ORAL ONCE
Status: COMPLETED | OUTPATIENT
Start: 2021-12-20 | End: 2021-12-20

## 2021-12-20 RX ORDER — WARFARIN SODIUM 2.5 MG/1
2.5 TABLET ORAL
Status: DISCONTINUED | OUTPATIENT
Start: 2021-12-20 | End: 2021-12-22

## 2021-12-20 RX ADMIN — ATORVASTATIN CALCIUM 80 MG: 80 TABLET, FILM COATED ORAL at 21:17

## 2021-12-20 RX ADMIN — ACETAMINOPHEN 975 MG: 325 TABLET, FILM COATED ORAL at 05:39

## 2021-12-20 RX ADMIN — OXYCODONE HYDROCHLORIDE 10 MG: 10 TABLET ORAL at 12:39

## 2021-12-20 RX ADMIN — WARFARIN SODIUM 2.5 MG: 2.5 TABLET ORAL at 18:16

## 2021-12-20 RX ADMIN — CARVEDILOL 50 MG: 25 TABLET, FILM COATED ORAL at 16:27

## 2021-12-20 RX ADMIN — PANTOPRAZOLE SODIUM 40 MG: 40 TABLET, DELAYED RELEASE ORAL at 08:42

## 2021-12-20 RX ADMIN — HYDROMORPHONE HYDROCHLORIDE 0.2 MG: 0.2 INJECTION, SOLUTION INTRAMUSCULAR; INTRAVENOUS; SUBCUTANEOUS at 19:33

## 2021-12-20 RX ADMIN — INSULIN LISPRO 2 UNITS: 100 INJECTION, SOLUTION INTRAVENOUS; SUBCUTANEOUS at 18:18

## 2021-12-20 RX ADMIN — ACETAMINOPHEN 975 MG: 325 TABLET, FILM COATED ORAL at 21:18

## 2021-12-20 RX ADMIN — SODIUM CHLORIDE, SODIUM LACTATE, POTASSIUM CHLORIDE, AND CALCIUM CHLORIDE 75 ML/HR: .6; .31; .03; .02 INJECTION, SOLUTION INTRAVENOUS at 03:17

## 2021-12-20 RX ADMIN — OXYCODONE HYDROCHLORIDE 10 MG: 10 TABLET ORAL at 18:16

## 2021-12-20 RX ADMIN — ASPIRIN 81 MG: 81 TABLET, COATED ORAL at 08:42

## 2021-12-20 RX ADMIN — METHOCARBAMOL TABLETS 750 MG: 750 TABLET, COATED ORAL at 21:16

## 2021-12-20 RX ADMIN — CARVEDILOL 50 MG: 25 TABLET, FILM COATED ORAL at 08:42

## 2021-12-20 RX ADMIN — MIRTAZAPINE 15 MG: 15 TABLET, FILM COATED ORAL at 21:18

## 2021-12-20 RX ADMIN — METHOCARBAMOL TABLETS 750 MG: 750 TABLET, COATED ORAL at 14:44

## 2021-12-20 RX ADMIN — INSULIN LISPRO 1 UNITS: 100 INJECTION, SOLUTION INTRAVENOUS; SUBCUTANEOUS at 06:58

## 2021-12-20 RX ADMIN — HEPARIN SODIUM 12.1 UNITS/KG/HR: 10000 INJECTION, SOLUTION INTRAVENOUS at 08:44

## 2021-12-20 RX ADMIN — ACETAMINOPHEN 975 MG: 325 TABLET, FILM COATED ORAL at 14:44

## 2021-12-20 RX ADMIN — HYDROMORPHONE HYDROCHLORIDE 0.2 MG: 0.2 INJECTION, SOLUTION INTRAMUSCULAR; INTRAVENOUS; SUBCUTANEOUS at 16:27

## 2021-12-20 RX ADMIN — METHOCARBAMOL TABLETS 750 MG: 750 TABLET, COATED ORAL at 08:42

## 2021-12-20 RX ADMIN — POTASSIUM CHLORIDE 40 MEQ: 1500 TABLET, EXTENDED RELEASE ORAL at 08:42

## 2021-12-20 RX ADMIN — OXYCODONE HYDROCHLORIDE 10 MG: 10 TABLET ORAL at 22:40

## 2021-12-21 LAB
ALBUMIN SERPL BCP-MCNC: 2.5 G/DL (ref 3.5–5)
ALP SERPL-CCNC: 153 U/L (ref 46–116)
ALT SERPL W P-5'-P-CCNC: 49 U/L (ref 12–78)
ANION GAP SERPL CALCULATED.3IONS-SCNC: 3 MMOL/L (ref 4–13)
APTT PPP: 56 SECONDS (ref 23–37)
APTT PPP: 66 SECONDS (ref 23–37)
APTT PPP: 89 SECONDS (ref 23–37)
AST SERPL W P-5'-P-CCNC: 45 U/L (ref 5–45)
BILIRUB SERPL-MCNC: 0.56 MG/DL (ref 0.2–1)
BUN SERPL-MCNC: 13 MG/DL (ref 5–25)
CALCIUM ALBUM COR SERPL-MCNC: 10 MG/DL (ref 8.3–10.1)
CALCIUM SERPL-MCNC: 8.8 MG/DL (ref 8.3–10.1)
CHLORIDE SERPL-SCNC: 110 MMOL/L (ref 100–108)
CO2 SERPL-SCNC: 25 MMOL/L (ref 21–32)
CREAT SERPL-MCNC: 0.92 MG/DL (ref 0.6–1.3)
GFR SERPL CREATININE-BSD FRML MDRD: 91 ML/MIN/1.73SQ M
GLUCOSE SERPL-MCNC: 139 MG/DL (ref 65–140)
GLUCOSE SERPL-MCNC: 152 MG/DL (ref 65–140)
GLUCOSE SERPL-MCNC: 157 MG/DL (ref 65–140)
GLUCOSE SERPL-MCNC: 180 MG/DL (ref 65–140)
GLUCOSE SERPL-MCNC: 190 MG/DL (ref 65–140)
GLUCOSE SERPL-MCNC: 201 MG/DL (ref 65–140)
INR PPP: 1.15 (ref 0.84–1.19)
POTASSIUM SERPL-SCNC: 4.4 MMOL/L (ref 3.5–5.3)
PROT SERPL-MCNC: 5.8 G/DL (ref 6.4–8.2)
PROTHROMBIN TIME: 14.3 SECONDS (ref 11.6–14.5)
SODIUM SERPL-SCNC: 138 MMOL/L (ref 136–145)

## 2021-12-21 PROCEDURE — 85730 THROMBOPLASTIN TIME PARTIAL: CPT | Performed by: SURGERY

## 2021-12-21 PROCEDURE — 85610 PROTHROMBIN TIME: CPT | Performed by: SURGERY

## 2021-12-21 PROCEDURE — 80053 COMPREHEN METABOLIC PANEL: CPT | Performed by: SURGERY

## 2021-12-21 PROCEDURE — 99232 SBSQ HOSP IP/OBS MODERATE 35: CPT | Performed by: SURGERY

## 2021-12-21 PROCEDURE — 82948 REAGENT STRIP/BLOOD GLUCOSE: CPT

## 2021-12-21 RX ORDER — OXYCODONE HYDROCHLORIDE 5 MG/1
2.5 TABLET ORAL EVERY 4 HOURS PRN
Status: DISCONTINUED | OUTPATIENT
Start: 2021-12-21 | End: 2021-12-25 | Stop reason: HOSPADM

## 2021-12-21 RX ORDER — DOCUSATE SODIUM 100 MG/1
100 CAPSULE, LIQUID FILLED ORAL 2 TIMES DAILY
Status: DISCONTINUED | OUTPATIENT
Start: 2021-12-21 | End: 2021-12-25 | Stop reason: HOSPADM

## 2021-12-21 RX ORDER — HYDROMORPHONE HCL IN WATER/PF 6 MG/30 ML
0.1 PATIENT CONTROLLED ANALGESIA SYRINGE INTRAVENOUS
Status: DISCONTINUED | OUTPATIENT
Start: 2021-12-21 | End: 2021-12-25

## 2021-12-21 RX ORDER — OXYCODONE HYDROCHLORIDE 5 MG/1
5 TABLET ORAL EVERY 4 HOURS PRN
Status: DISCONTINUED | OUTPATIENT
Start: 2021-12-21 | End: 2021-12-25 | Stop reason: HOSPADM

## 2021-12-21 RX ORDER — AMOXICILLIN 250 MG
1 CAPSULE ORAL
Status: DISCONTINUED | OUTPATIENT
Start: 2021-12-21 | End: 2021-12-25 | Stop reason: HOSPADM

## 2021-12-21 RX ADMIN — METHOCARBAMOL TABLETS 750 MG: 750 TABLET, COATED ORAL at 12:20

## 2021-12-21 RX ADMIN — HYDROMORPHONE HYDROCHLORIDE 0.2 MG: 0.2 INJECTION, SOLUTION INTRAMUSCULAR; INTRAVENOUS; SUBCUTANEOUS at 15:08

## 2021-12-21 RX ADMIN — INSULIN LISPRO 2 UNITS: 100 INJECTION, SOLUTION INTRAVENOUS; SUBCUTANEOUS at 00:22

## 2021-12-21 RX ADMIN — ACETAMINOPHEN 975 MG: 325 TABLET, FILM COATED ORAL at 13:57

## 2021-12-21 RX ADMIN — HEPARIN SODIUM 14.1 UNITS/KG/HR: 10000 INJECTION, SOLUTION INTRAVENOUS at 22:56

## 2021-12-21 RX ADMIN — SENNOSIDES AND DOCUSATE SODIUM 1 TABLET: 50; 8.6 TABLET ORAL at 22:59

## 2021-12-21 RX ADMIN — METHOCARBAMOL TABLETS 750 MG: 750 TABLET, COATED ORAL at 18:01

## 2021-12-21 RX ADMIN — MIRTAZAPINE 15 MG: 15 TABLET, FILM COATED ORAL at 22:59

## 2021-12-21 RX ADMIN — OXYCODONE HYDROCHLORIDE 5 MG: 5 TABLET ORAL at 22:58

## 2021-12-21 RX ADMIN — HYDROMORPHONE HYDROCHLORIDE 0.2 MG: 0.2 INJECTION, SOLUTION INTRAMUSCULAR; INTRAVENOUS; SUBCUTANEOUS at 10:34

## 2021-12-21 RX ADMIN — OXYCODONE HYDROCHLORIDE 10 MG: 10 TABLET ORAL at 18:02

## 2021-12-21 RX ADMIN — CARVEDILOL 50 MG: 25 TABLET, FILM COATED ORAL at 18:01

## 2021-12-21 RX ADMIN — OXYCODONE HYDROCHLORIDE 10 MG: 10 TABLET ORAL at 13:57

## 2021-12-21 RX ADMIN — CARVEDILOL 50 MG: 25 TABLET, FILM COATED ORAL at 09:13

## 2021-12-21 RX ADMIN — WARFARIN SODIUM 2.5 MG: 2.5 TABLET ORAL at 18:01

## 2021-12-21 RX ADMIN — HEPARIN SODIUM 14.1 UNITS/KG/HR: 10000 INJECTION, SOLUTION INTRAVENOUS at 06:05

## 2021-12-21 RX ADMIN — HYDROMORPHONE HYDROCHLORIDE 0.1 MG: 0.2 INJECTION, SOLUTION INTRAMUSCULAR; INTRAVENOUS; SUBCUTANEOUS at 20:11

## 2021-12-21 RX ADMIN — METHOCARBAMOL TABLETS 750 MG: 750 TABLET, COATED ORAL at 23:02

## 2021-12-21 RX ADMIN — METHOCARBAMOL TABLETS 750 MG: 750 TABLET, COATED ORAL at 06:16

## 2021-12-21 RX ADMIN — ASPIRIN 81 MG: 81 TABLET, COATED ORAL at 09:13

## 2021-12-21 RX ADMIN — HYDROMORPHONE HYDROCHLORIDE 0.2 MG: 0.2 INJECTION, SOLUTION INTRAMUSCULAR; INTRAVENOUS; SUBCUTANEOUS at 00:22

## 2021-12-21 RX ADMIN — PANTOPRAZOLE SODIUM 40 MG: 40 TABLET, DELAYED RELEASE ORAL at 09:14

## 2021-12-21 RX ADMIN — OXYCODONE HYDROCHLORIDE 10 MG: 10 TABLET ORAL at 09:13

## 2021-12-21 RX ADMIN — ACETAMINOPHEN 975 MG: 325 TABLET, FILM COATED ORAL at 06:16

## 2021-12-21 RX ADMIN — ACETAMINOPHEN 975 MG: 325 TABLET, FILM COATED ORAL at 22:58

## 2021-12-21 RX ADMIN — ATORVASTATIN CALCIUM 80 MG: 80 TABLET, FILM COATED ORAL at 22:58

## 2021-12-21 RX ADMIN — HYDROMORPHONE HYDROCHLORIDE 0.2 MG: 0.2 INJECTION, SOLUTION INTRAMUSCULAR; INTRAVENOUS; SUBCUTANEOUS at 06:17

## 2021-12-21 RX ADMIN — DOCUSATE SODIUM 100 MG: 100 CAPSULE ORAL at 20:15

## 2021-12-22 ENCOUNTER — TELEPHONE (OUTPATIENT)
Dept: HEMATOLOGY ONCOLOGY | Facility: CLINIC | Age: 56
End: 2021-12-22

## 2021-12-22 LAB
APTT PPP: 90 SECONDS (ref 23–37)
GLUCOSE SERPL-MCNC: 127 MG/DL (ref 65–140)
GLUCOSE SERPL-MCNC: 144 MG/DL (ref 65–140)
GLUCOSE SERPL-MCNC: 175 MG/DL (ref 65–140)
INR PPP: 1.07 (ref 0.84–1.19)
PROTHROMBIN TIME: 13.5 SECONDS (ref 11.6–14.5)

## 2021-12-22 PROCEDURE — 82948 REAGENT STRIP/BLOOD GLUCOSE: CPT

## 2021-12-22 PROCEDURE — 85730 THROMBOPLASTIN TIME PARTIAL: CPT | Performed by: SURGERY

## 2021-12-22 PROCEDURE — 85610 PROTHROMBIN TIME: CPT | Performed by: SURGERY

## 2021-12-22 PROCEDURE — 99232 SBSQ HOSP IP/OBS MODERATE 35: CPT | Performed by: SURGERY

## 2021-12-22 RX ORDER — WARFARIN SODIUM 7.5 MG/1
7.5 TABLET ORAL
Status: DISCONTINUED | OUTPATIENT
Start: 2021-12-22 | End: 2021-12-22

## 2021-12-22 RX ORDER — WARFARIN SODIUM 5 MG/1
5 TABLET ORAL
Status: DISCONTINUED | OUTPATIENT
Start: 2021-12-22 | End: 2021-12-22

## 2021-12-22 RX ORDER — WARFARIN SODIUM 5 MG/1
5 TABLET ORAL
Status: DISCONTINUED | OUTPATIENT
Start: 2021-12-22 | End: 2021-12-24

## 2021-12-22 RX ORDER — WARFARIN SODIUM 5 MG/1
10 TABLET ORAL
Status: DISCONTINUED | OUTPATIENT
Start: 2021-12-22 | End: 2021-12-22

## 2021-12-22 RX ADMIN — ATORVASTATIN CALCIUM 80 MG: 80 TABLET, FILM COATED ORAL at 20:36

## 2021-12-22 RX ADMIN — METHOCARBAMOL TABLETS 750 MG: 750 TABLET, COATED ORAL at 12:31

## 2021-12-22 RX ADMIN — CARVEDILOL 50 MG: 25 TABLET, FILM COATED ORAL at 18:05

## 2021-12-22 RX ADMIN — DOCUSATE SODIUM 100 MG: 100 CAPSULE ORAL at 10:07

## 2021-12-22 RX ADMIN — METHOCARBAMOL TABLETS 750 MG: 750 TABLET, COATED ORAL at 05:34

## 2021-12-22 RX ADMIN — HYDROMORPHONE HYDROCHLORIDE 0.1 MG: 0.2 INJECTION, SOLUTION INTRAMUSCULAR; INTRAVENOUS; SUBCUTANEOUS at 00:21

## 2021-12-22 RX ADMIN — OXYCODONE HYDROCHLORIDE 5 MG: 5 TABLET ORAL at 14:48

## 2021-12-22 RX ADMIN — MIRTAZAPINE 15 MG: 15 TABLET, FILM COATED ORAL at 20:34

## 2021-12-22 RX ADMIN — ACETAMINOPHEN 975 MG: 325 TABLET, FILM COATED ORAL at 20:34

## 2021-12-22 RX ADMIN — OXYCODONE HYDROCHLORIDE 5 MG: 5 TABLET ORAL at 20:37

## 2021-12-22 RX ADMIN — OXYCODONE HYDROCHLORIDE 5 MG: 5 TABLET ORAL at 10:10

## 2021-12-22 RX ADMIN — ASPIRIN 81 MG: 81 TABLET, COATED ORAL at 10:07

## 2021-12-22 RX ADMIN — ACETAMINOPHEN 975 MG: 325 TABLET, FILM COATED ORAL at 05:34

## 2021-12-22 RX ADMIN — DOCUSATE SODIUM 100 MG: 100 CAPSULE ORAL at 18:05

## 2021-12-22 RX ADMIN — HYDROMORPHONE HYDROCHLORIDE 0.1 MG: 0.2 INJECTION, SOLUTION INTRAMUSCULAR; INTRAVENOUS; SUBCUTANEOUS at 07:40

## 2021-12-22 RX ADMIN — OXYCODONE HYDROCHLORIDE 5 MG: 5 TABLET ORAL at 05:34

## 2021-12-22 RX ADMIN — ACETAMINOPHEN 975 MG: 325 TABLET, FILM COATED ORAL at 14:47

## 2021-12-22 RX ADMIN — ENOXAPARIN SODIUM 100 MG: 100 INJECTION SUBCUTANEOUS at 20:33

## 2021-12-22 RX ADMIN — PANTOPRAZOLE SODIUM 40 MG: 40 TABLET, DELAYED RELEASE ORAL at 10:07

## 2021-12-22 RX ADMIN — WARFARIN SODIUM 5 MG: 5 TABLET ORAL at 18:05

## 2021-12-22 RX ADMIN — SENNOSIDES AND DOCUSATE SODIUM 1 TABLET: 50; 8.6 TABLET ORAL at 20:35

## 2021-12-22 RX ADMIN — METHOCARBAMOL TABLETS 750 MG: 750 TABLET, COATED ORAL at 18:05

## 2021-12-22 RX ADMIN — CARVEDILOL 50 MG: 25 TABLET, FILM COATED ORAL at 10:07

## 2021-12-22 RX ADMIN — METHOCARBAMOL TABLETS 750 MG: 750 TABLET, COATED ORAL at 23:37

## 2021-12-23 LAB
ANION GAP SERPL CALCULATED.3IONS-SCNC: 6 MMOL/L (ref 4–13)
APTT PPP: 56 SECONDS (ref 23–37)
BASOPHILS # BLD AUTO: 0.06 THOUSANDS/ΜL (ref 0–0.1)
BASOPHILS NFR BLD AUTO: 1 % (ref 0–1)
BUN SERPL-MCNC: 11 MG/DL (ref 5–25)
CALCIUM SERPL-MCNC: 9.2 MG/DL (ref 8.3–10.1)
CHLORIDE SERPL-SCNC: 106 MMOL/L (ref 100–108)
CO2 SERPL-SCNC: 27 MMOL/L (ref 21–32)
CREAT SERPL-MCNC: 0.92 MG/DL (ref 0.6–1.3)
EOSINOPHIL # BLD AUTO: 0.38 THOUSAND/ΜL (ref 0–0.61)
EOSINOPHIL NFR BLD AUTO: 6 % (ref 0–6)
ERYTHROCYTE [DISTWIDTH] IN BLOOD BY AUTOMATED COUNT: 12.7 % (ref 11.6–15.1)
GFR SERPL CREATININE-BSD FRML MDRD: 91 ML/MIN/1.73SQ M
GLUCOSE SERPL-MCNC: 138 MG/DL (ref 65–140)
GLUCOSE SERPL-MCNC: 147 MG/DL (ref 65–140)
GLUCOSE SERPL-MCNC: 164 MG/DL (ref 65–140)
GLUCOSE SERPL-MCNC: 175 MG/DL (ref 65–140)
GLUCOSE SERPL-MCNC: 176 MG/DL (ref 65–140)
HCT VFR BLD AUTO: 41.6 % (ref 36.5–49.3)
HGB BLD-MCNC: 14.1 G/DL (ref 12–17)
IGG SERPL-MCNC: 817 MG/DL (ref 603–1613)
IGG1 SER-MCNC: NORMAL MG/DL
IGG2 SER-MCNC: 209 MG/DL (ref 130–555)
IGG3 SER-MCNC: 56 MG/DL (ref 15–102)
IGG4 SER-MCNC: 34 MG/DL (ref 2–96)
IMM GRANULOCYTES # BLD AUTO: 0.08 THOUSAND/UL (ref 0–0.2)
IMM GRANULOCYTES NFR BLD AUTO: 1 % (ref 0–2)
INR PPP: 1.3 (ref 0.84–1.19)
LIPASE SERPL-CCNC: 98 U/L (ref 73–393)
LYMPHOCYTES # BLD AUTO: 1.99 THOUSANDS/ΜL (ref 0.6–4.47)
LYMPHOCYTES NFR BLD AUTO: 29 % (ref 14–44)
MCH RBC QN AUTO: 30.3 PG (ref 26.8–34.3)
MCHC RBC AUTO-ENTMCNC: 33.9 G/DL (ref 31.4–37.4)
MCV RBC AUTO: 90 FL (ref 82–98)
MONOCYTES # BLD AUTO: 0.53 THOUSAND/ΜL (ref 0.17–1.22)
MONOCYTES NFR BLD AUTO: 8 % (ref 4–12)
NEUTROPHILS # BLD AUTO: 3.87 THOUSANDS/ΜL (ref 1.85–7.62)
NEUTS SEG NFR BLD AUTO: 55 % (ref 43–75)
NRBC BLD AUTO-RTO: 0 /100 WBCS
PLATELET # BLD AUTO: 346 THOUSANDS/UL (ref 149–390)
PMV BLD AUTO: 9.2 FL (ref 8.9–12.7)
POTASSIUM SERPL-SCNC: 3.7 MMOL/L (ref 3.5–5.3)
PROTHROMBIN TIME: 15.7 SECONDS (ref 11.6–14.5)
RBC # BLD AUTO: 4.65 MILLION/UL (ref 3.88–5.62)
SODIUM SERPL-SCNC: 139 MMOL/L (ref 136–145)
WBC # BLD AUTO: 6.91 THOUSAND/UL (ref 4.31–10.16)

## 2021-12-23 PROCEDURE — 85730 THROMBOPLASTIN TIME PARTIAL: CPT | Performed by: SURGERY

## 2021-12-23 PROCEDURE — 83690 ASSAY OF LIPASE: CPT | Performed by: SURGERY

## 2021-12-23 PROCEDURE — 80048 BASIC METABOLIC PNL TOTAL CA: CPT | Performed by: NURSE PRACTITIONER

## 2021-12-23 PROCEDURE — 85025 COMPLETE CBC W/AUTO DIFF WBC: CPT | Performed by: NURSE PRACTITIONER

## 2021-12-23 PROCEDURE — 99232 SBSQ HOSP IP/OBS MODERATE 35: CPT | Performed by: SURGERY

## 2021-12-23 PROCEDURE — 82948 REAGENT STRIP/BLOOD GLUCOSE: CPT

## 2021-12-23 PROCEDURE — 85610 PROTHROMBIN TIME: CPT | Performed by: NURSE PRACTITIONER

## 2021-12-23 RX ORDER — POTASSIUM CHLORIDE 20 MEQ/1
40 TABLET, EXTENDED RELEASE ORAL ONCE
Status: COMPLETED | OUTPATIENT
Start: 2021-12-23 | End: 2021-12-23

## 2021-12-23 RX ADMIN — ENOXAPARIN SODIUM 100 MG: 100 INJECTION SUBCUTANEOUS at 11:32

## 2021-12-23 RX ADMIN — ENOXAPARIN SODIUM 100 MG: 100 INJECTION SUBCUTANEOUS at 21:51

## 2021-12-23 RX ADMIN — MIRTAZAPINE 15 MG: 15 TABLET, FILM COATED ORAL at 21:51

## 2021-12-23 RX ADMIN — ASPIRIN 81 MG: 81 TABLET, COATED ORAL at 11:30

## 2021-12-23 RX ADMIN — METHOCARBAMOL TABLETS 750 MG: 750 TABLET, COATED ORAL at 06:28

## 2021-12-23 RX ADMIN — WARFARIN SODIUM 5 MG: 5 TABLET ORAL at 18:09

## 2021-12-23 RX ADMIN — OXYCODONE HYDROCHLORIDE 5 MG: 5 TABLET ORAL at 15:12

## 2021-12-23 RX ADMIN — SENNOSIDES AND DOCUSATE SODIUM 1 TABLET: 50; 8.6 TABLET ORAL at 21:51

## 2021-12-23 RX ADMIN — DOCUSATE SODIUM 100 MG: 100 CAPSULE ORAL at 11:30

## 2021-12-23 RX ADMIN — PANTOPRAZOLE SODIUM 40 MG: 40 TABLET, DELAYED RELEASE ORAL at 11:31

## 2021-12-23 RX ADMIN — OXYCODONE HYDROCHLORIDE 5 MG: 5 TABLET ORAL at 11:30

## 2021-12-23 RX ADMIN — POTASSIUM CHLORIDE 40 MEQ: 1500 TABLET, EXTENDED RELEASE ORAL at 21:51

## 2021-12-23 RX ADMIN — CARVEDILOL 50 MG: 25 TABLET, FILM COATED ORAL at 18:09

## 2021-12-23 RX ADMIN — ACETAMINOPHEN 975 MG: 325 TABLET, FILM COATED ORAL at 15:11

## 2021-12-23 RX ADMIN — METHOCARBAMOL TABLETS 750 MG: 750 TABLET, COATED ORAL at 18:09

## 2021-12-23 RX ADMIN — ATORVASTATIN CALCIUM 80 MG: 80 TABLET, FILM COATED ORAL at 21:51

## 2021-12-23 RX ADMIN — CARVEDILOL 50 MG: 25 TABLET, FILM COATED ORAL at 11:31

## 2021-12-23 RX ADMIN — OXYCODONE HYDROCHLORIDE 5 MG: 5 TABLET ORAL at 19:43

## 2021-12-23 RX ADMIN — HYDROMORPHONE HYDROCHLORIDE 0.1 MG: 0.2 INJECTION, SOLUTION INTRAMUSCULAR; INTRAVENOUS; SUBCUTANEOUS at 18:09

## 2021-12-23 RX ADMIN — ACETAMINOPHEN 975 MG: 325 TABLET, FILM COATED ORAL at 06:28

## 2021-12-23 RX ADMIN — METHOCARBAMOL TABLETS 750 MG: 750 TABLET, COATED ORAL at 11:38

## 2021-12-23 RX ADMIN — DOCUSATE SODIUM 100 MG: 100 CAPSULE ORAL at 18:09

## 2021-12-23 RX ADMIN — ACETAMINOPHEN 975 MG: 325 TABLET, FILM COATED ORAL at 21:51

## 2021-12-23 RX ADMIN — HYDROMORPHONE HYDROCHLORIDE 0.1 MG: 0.2 INJECTION, SOLUTION INTRAMUSCULAR; INTRAVENOUS; SUBCUTANEOUS at 21:51

## 2021-12-24 LAB
ANION GAP SERPL CALCULATED.3IONS-SCNC: 2 MMOL/L (ref 4–13)
BUN SERPL-MCNC: 13 MG/DL (ref 5–25)
CALCIUM SERPL-MCNC: 9.3 MG/DL (ref 8.3–10.1)
CHLORIDE SERPL-SCNC: 108 MMOL/L (ref 100–108)
CO2 SERPL-SCNC: 30 MMOL/L (ref 21–32)
CREAT SERPL-MCNC: 1.06 MG/DL (ref 0.6–1.3)
GFR SERPL CREATININE-BSD FRML MDRD: 76 ML/MIN/1.73SQ M
GLUCOSE SERPL-MCNC: 127 MG/DL (ref 65–140)
GLUCOSE SERPL-MCNC: 134 MG/DL (ref 65–140)
GLUCOSE SERPL-MCNC: 162 MG/DL (ref 65–140)
GLUCOSE SERPL-MCNC: 197 MG/DL (ref 65–140)
GLUCOSE SERPL-MCNC: 200 MG/DL (ref 65–140)
INR PPP: 1.41 (ref 0.84–1.19)
POTASSIUM SERPL-SCNC: 3.8 MMOL/L (ref 3.5–5.3)
PROTHROMBIN TIME: 16.7 SECONDS (ref 11.6–14.5)
SODIUM SERPL-SCNC: 140 MMOL/L (ref 136–145)

## 2021-12-24 PROCEDURE — NC001 PR NO CHARGE: Performed by: SURGERY

## 2021-12-24 PROCEDURE — 99232 SBSQ HOSP IP/OBS MODERATE 35: CPT | Performed by: SURGERY

## 2021-12-24 PROCEDURE — 80048 BASIC METABOLIC PNL TOTAL CA: CPT | Performed by: SURGERY

## 2021-12-24 PROCEDURE — 85610 PROTHROMBIN TIME: CPT | Performed by: SURGERY

## 2021-12-24 PROCEDURE — 82948 REAGENT STRIP/BLOOD GLUCOSE: CPT

## 2021-12-24 RX ORDER — METHOCARBAMOL 750 MG/1
750 TABLET, FILM COATED ORAL EVERY 6 HOURS SCHEDULED
Qty: 30 TABLET | Refills: 0
Start: 2021-12-24 | End: 2021-12-27

## 2021-12-24 RX ORDER — AMOXICILLIN 250 MG
1 CAPSULE ORAL
Qty: 10 TABLET | Refills: 0
Start: 2021-12-24

## 2021-12-24 RX ORDER — ACETAMINOPHEN 325 MG/1
975 TABLET ORAL EVERY 8 HOURS SCHEDULED
Qty: 30 TABLET | Refills: 0
Start: 2021-12-24 | End: 2021-12-27 | Stop reason: CLARIF

## 2021-12-24 RX ORDER — OXYCODONE HYDROCHLORIDE 5 MG/1
2.5 TABLET ORAL EVERY 4 HOURS PRN
Qty: 20 TABLET | Refills: 0 | OUTPATIENT
Start: 2021-12-24 | End: 2021-12-27

## 2021-12-24 RX ADMIN — OXYCODONE HYDROCHLORIDE 5 MG: 5 TABLET ORAL at 18:34

## 2021-12-24 RX ADMIN — HYDROMORPHONE HYDROCHLORIDE 0.1 MG: 0.2 INJECTION, SOLUTION INTRAMUSCULAR; INTRAVENOUS; SUBCUTANEOUS at 06:53

## 2021-12-24 RX ADMIN — APIXABAN 5 MG: 5 TABLET, FILM COATED ORAL at 20:17

## 2021-12-24 RX ADMIN — CARVEDILOL 50 MG: 25 TABLET, FILM COATED ORAL at 08:12

## 2021-12-24 RX ADMIN — METHOCARBAMOL TABLETS 750 MG: 750 TABLET, COATED ORAL at 13:31

## 2021-12-24 RX ADMIN — SENNOSIDES AND DOCUSATE SODIUM 1 TABLET: 50; 8.6 TABLET ORAL at 22:10

## 2021-12-24 RX ADMIN — HYDROMORPHONE HYDROCHLORIDE 0.1 MG: 0.2 INJECTION, SOLUTION INTRAMUSCULAR; INTRAVENOUS; SUBCUTANEOUS at 14:53

## 2021-12-24 RX ADMIN — OXYCODONE HYDROCHLORIDE 5 MG: 5 TABLET ORAL at 10:59

## 2021-12-24 RX ADMIN — METHOCARBAMOL TABLETS 750 MG: 750 TABLET, COATED ORAL at 05:02

## 2021-12-24 RX ADMIN — ACETAMINOPHEN 975 MG: 325 TABLET, FILM COATED ORAL at 05:02

## 2021-12-24 RX ADMIN — ASPIRIN 81 MG: 81 TABLET, COATED ORAL at 08:12

## 2021-12-24 RX ADMIN — MIRTAZAPINE 15 MG: 15 TABLET, FILM COATED ORAL at 22:11

## 2021-12-24 RX ADMIN — ENOXAPARIN SODIUM 100 MG: 100 INJECTION SUBCUTANEOUS at 08:13

## 2021-12-24 RX ADMIN — HYDROMORPHONE HYDROCHLORIDE 0.1 MG: 0.2 INJECTION, SOLUTION INTRAMUSCULAR; INTRAVENOUS; SUBCUTANEOUS at 23:26

## 2021-12-24 RX ADMIN — DOCUSATE SODIUM 100 MG: 100 CAPSULE ORAL at 08:12

## 2021-12-24 RX ADMIN — ATORVASTATIN CALCIUM 80 MG: 80 TABLET, FILM COATED ORAL at 22:11

## 2021-12-24 RX ADMIN — PANTOPRAZOLE SODIUM 40 MG: 40 TABLET, DELAYED RELEASE ORAL at 08:13

## 2021-12-24 RX ADMIN — OXYCODONE HYDROCHLORIDE 5 MG: 5 TABLET ORAL at 05:02

## 2021-12-24 RX ADMIN — HYDROMORPHONE HYDROCHLORIDE 0.1 MG: 0.2 INJECTION, SOLUTION INTRAMUSCULAR; INTRAVENOUS; SUBCUTANEOUS at 11:03

## 2021-12-24 RX ADMIN — ACETAMINOPHEN 975 MG: 325 TABLET, FILM COATED ORAL at 22:11

## 2021-12-24 RX ADMIN — METHOCARBAMOL TABLETS 750 MG: 750 TABLET, COATED ORAL at 00:08

## 2021-12-24 RX ADMIN — OXYCODONE HYDROCHLORIDE 5 MG: 5 TABLET ORAL at 22:47

## 2021-12-24 RX ADMIN — OXYCODONE HYDROCHLORIDE 5 MG: 5 TABLET ORAL at 00:08

## 2021-12-24 RX ADMIN — CARVEDILOL 50 MG: 25 TABLET, FILM COATED ORAL at 15:55

## 2021-12-24 RX ADMIN — HYDROMORPHONE HYDROCHLORIDE 0.1 MG: 0.2 INJECTION, SOLUTION INTRAMUSCULAR; INTRAVENOUS; SUBCUTANEOUS at 20:17

## 2021-12-25 VITALS
OXYGEN SATURATION: 95 % | HEART RATE: 53 BPM | DIASTOLIC BLOOD PRESSURE: 106 MMHG | BODY MASS INDEX: 28.23 KG/M2 | RESPIRATION RATE: 18 BRPM | WEIGHT: 220 LBS | SYSTOLIC BLOOD PRESSURE: 151 MMHG | HEIGHT: 74 IN | TEMPERATURE: 97.9 F

## 2021-12-25 LAB
FLUAV RNA RESP QL NAA+PROBE: NEGATIVE
FLUBV RNA RESP QL NAA+PROBE: NEGATIVE
GLUCOSE SERPL-MCNC: 121 MG/DL (ref 65–140)
GLUCOSE SERPL-MCNC: 226 MG/DL (ref 65–140)
RSV RNA RESP QL NAA+PROBE: NEGATIVE
SARS-COV-2 RNA RESP QL NAA+PROBE: NEGATIVE

## 2021-12-25 PROCEDURE — NC001 PR NO CHARGE: Performed by: SURGERY

## 2021-12-25 PROCEDURE — 82948 REAGENT STRIP/BLOOD GLUCOSE: CPT

## 2021-12-25 PROCEDURE — 0241U HB NFCT DS VIR RESP RNA 4 TRGT: CPT | Performed by: SURGERY

## 2021-12-25 PROCEDURE — 99238 HOSP IP/OBS DSCHRG MGMT 30/<: CPT | Performed by: SURGERY

## 2021-12-25 RX ORDER — ONDANSETRON 2 MG/ML
4 INJECTION INTRAMUSCULAR; INTRAVENOUS ONCE
Status: COMPLETED | OUTPATIENT
Start: 2021-12-25 | End: 2021-12-25

## 2021-12-25 RX ADMIN — OXYCODONE HYDROCHLORIDE 5 MG: 5 TABLET ORAL at 16:02

## 2021-12-25 RX ADMIN — ACETAMINOPHEN 975 MG: 325 TABLET, FILM COATED ORAL at 05:06

## 2021-12-25 RX ADMIN — OXYCODONE HYDROCHLORIDE 5 MG: 5 TABLET ORAL at 12:36

## 2021-12-25 RX ADMIN — ASPIRIN 81 MG: 81 TABLET, COATED ORAL at 08:21

## 2021-12-25 RX ADMIN — DOCUSATE SODIUM 100 MG: 100 CAPSULE ORAL at 08:21

## 2021-12-25 RX ADMIN — ACETAMINOPHEN 975 MG: 325 TABLET, FILM COATED ORAL at 14:13

## 2021-12-25 RX ADMIN — HYDROMORPHONE HYDROCHLORIDE 0.1 MG: 0.2 INJECTION, SOLUTION INTRAMUSCULAR; INTRAVENOUS; SUBCUTANEOUS at 06:46

## 2021-12-25 RX ADMIN — OXYCODONE HYDROCHLORIDE 5 MG: 5 TABLET ORAL at 05:06

## 2021-12-25 RX ADMIN — APIXABAN 5 MG: 5 TABLET, FILM COATED ORAL at 08:21

## 2021-12-25 RX ADMIN — ONDANSETRON 4 MG: 2 INJECTION INTRAMUSCULAR; INTRAVENOUS at 14:13

## 2021-12-25 RX ADMIN — CARVEDILOL 50 MG: 25 TABLET, FILM COATED ORAL at 08:21

## 2021-12-25 RX ADMIN — PANTOPRAZOLE SODIUM 40 MG: 40 TABLET, DELAYED RELEASE ORAL at 08:21

## 2021-12-27 ENCOUNTER — HOSPITAL ENCOUNTER (EMERGENCY)
Facility: HOSPITAL | Age: 56
Discharge: NURSING, REHAB OR LONG-TERM CARE | End: 2021-12-27
Attending: EMERGENCY MEDICINE | Admitting: EMERGENCY MEDICINE
Payer: COMMERCIAL

## 2021-12-27 ENCOUNTER — APPOINTMENT (EMERGENCY)
Dept: RADIOLOGY | Facility: HOSPITAL | Age: 56
End: 2021-12-27
Payer: COMMERCIAL

## 2021-12-27 VITALS
HEART RATE: 90 BPM | TEMPERATURE: 98.2 F | OXYGEN SATURATION: 98 % | SYSTOLIC BLOOD PRESSURE: 129 MMHG | DIASTOLIC BLOOD PRESSURE: 74 MMHG | RESPIRATION RATE: 18 BRPM

## 2021-12-27 DIAGNOSIS — K85.90 ACUTE PANCREATITIS, UNSPECIFIED COMPLICATION STATUS, UNSPECIFIED PANCREATITIS TYPE: ICD-10-CM

## 2021-12-27 DIAGNOSIS — K85.90 ACUTE PANCREATITIS: Primary | ICD-10-CM

## 2021-12-27 DIAGNOSIS — K81.9 CHOLECYSTITIS: ICD-10-CM

## 2021-12-27 DIAGNOSIS — R10.13 EPIGASTRIC PAIN: ICD-10-CM

## 2021-12-27 LAB
ALBUMIN SERPL BCP-MCNC: 3.3 G/DL (ref 3.5–5)
ALP SERPL-CCNC: 141 U/L (ref 46–116)
ALT SERPL W P-5'-P-CCNC: 41 U/L (ref 12–78)
ANION GAP SERPL CALCULATED.3IONS-SCNC: 5 MMOL/L (ref 4–13)
AST SERPL W P-5'-P-CCNC: 18 U/L (ref 5–45)
BASOPHILS # BLD AUTO: 0.08 THOUSANDS/ΜL (ref 0–0.1)
BASOPHILS NFR BLD AUTO: 1 % (ref 0–1)
BILIRUB SERPL-MCNC: 0.59 MG/DL (ref 0.2–1)
BUN SERPL-MCNC: 18 MG/DL (ref 5–25)
CALCIUM ALBUM COR SERPL-MCNC: 9.5 MG/DL (ref 8.3–10.1)
CALCIUM SERPL-MCNC: 8.9 MG/DL (ref 8.3–10.1)
CHLORIDE SERPL-SCNC: 104 MMOL/L (ref 100–108)
CO2 SERPL-SCNC: 29 MMOL/L (ref 21–32)
CREAT SERPL-MCNC: 1.1 MG/DL (ref 0.6–1.3)
EOSINOPHIL # BLD AUTO: 0.37 THOUSAND/ΜL (ref 0–0.61)
EOSINOPHIL NFR BLD AUTO: 4 % (ref 0–6)
ERYTHROCYTE [DISTWIDTH] IN BLOOD BY AUTOMATED COUNT: 13.2 % (ref 11.6–15.1)
GFR SERPL CREATININE-BSD FRML MDRD: 73 ML/MIN/1.73SQ M
GLUCOSE SERPL-MCNC: 170 MG/DL (ref 65–140)
HCT VFR BLD AUTO: 43 % (ref 36.5–49.3)
HGB BLD-MCNC: 14.5 G/DL (ref 12–17)
IMM GRANULOCYTES # BLD AUTO: 0.05 THOUSAND/UL (ref 0–0.2)
IMM GRANULOCYTES NFR BLD AUTO: 1 % (ref 0–2)
LACTATE SERPL-SCNC: 1.4 MMOL/L (ref 0.5–2)
LIPASE SERPL-CCNC: 174 U/L (ref 73–393)
LYMPHOCYTES # BLD AUTO: 2.19 THOUSANDS/ΜL (ref 0.6–4.47)
LYMPHOCYTES NFR BLD AUTO: 22 % (ref 14–44)
MCH RBC QN AUTO: 30.5 PG (ref 26.8–34.3)
MCHC RBC AUTO-ENTMCNC: 33.7 G/DL (ref 31.4–37.4)
MCV RBC AUTO: 90 FL (ref 82–98)
MONOCYTES # BLD AUTO: 0.64 THOUSAND/ΜL (ref 0.17–1.22)
MONOCYTES NFR BLD AUTO: 7 % (ref 4–12)
NEUTROPHILS # BLD AUTO: 6.43 THOUSANDS/ΜL (ref 1.85–7.62)
NEUTS SEG NFR BLD AUTO: 65 % (ref 43–75)
NRBC BLD AUTO-RTO: 0 /100 WBCS
PLATELET # BLD AUTO: 317 THOUSANDS/UL (ref 149–390)
PMV BLD AUTO: 9.4 FL (ref 8.9–12.7)
POTASSIUM SERPL-SCNC: 3.6 MMOL/L (ref 3.5–5.3)
PROT SERPL-MCNC: 6.8 G/DL (ref 6.4–8.2)
RBC # BLD AUTO: 4.76 MILLION/UL (ref 3.88–5.62)
SODIUM SERPL-SCNC: 138 MMOL/L (ref 136–145)
WBC # BLD AUTO: 9.76 THOUSAND/UL (ref 4.31–10.16)

## 2021-12-27 PROCEDURE — 96375 TX/PRO/DX INJ NEW DRUG ADDON: CPT

## 2021-12-27 PROCEDURE — 80053 COMPREHEN METABOLIC PANEL: CPT | Performed by: EMERGENCY MEDICINE

## 2021-12-27 PROCEDURE — 96366 THER/PROPH/DIAG IV INF ADDON: CPT

## 2021-12-27 PROCEDURE — 99282 EMERGENCY DEPT VISIT SF MDM: CPT | Performed by: NURSE PRACTITIONER

## 2021-12-27 PROCEDURE — 74177 CT ABD & PELVIS W/CONTRAST: CPT

## 2021-12-27 PROCEDURE — G1004 CDSM NDSC: HCPCS

## 2021-12-27 PROCEDURE — 96376 TX/PRO/DX INJ SAME DRUG ADON: CPT

## 2021-12-27 PROCEDURE — 36415 COLL VENOUS BLD VENIPUNCTURE: CPT | Performed by: EMERGENCY MEDICINE

## 2021-12-27 PROCEDURE — 96365 THER/PROPH/DIAG IV INF INIT: CPT

## 2021-12-27 PROCEDURE — 99285 EMERGENCY DEPT VISIT HI MDM: CPT

## 2021-12-27 PROCEDURE — 99285 EMERGENCY DEPT VISIT HI MDM: CPT | Performed by: EMERGENCY MEDICINE

## 2021-12-27 PROCEDURE — NC001 PR NO CHARGE: Performed by: NURSE PRACTITIONER

## 2021-12-27 PROCEDURE — 83690 ASSAY OF LIPASE: CPT | Performed by: EMERGENCY MEDICINE

## 2021-12-27 PROCEDURE — 85025 COMPLETE CBC W/AUTO DIFF WBC: CPT | Performed by: EMERGENCY MEDICINE

## 2021-12-27 PROCEDURE — 83605 ASSAY OF LACTIC ACID: CPT | Performed by: EMERGENCY MEDICINE

## 2021-12-27 RX ORDER — MIRTAZAPINE 15 MG/1
15 TABLET, FILM COATED ORAL
Status: DISCONTINUED | OUTPATIENT
Start: 2021-12-27 | End: 2021-12-27 | Stop reason: HOSPADM

## 2021-12-27 RX ORDER — HYDROMORPHONE HYDROCHLORIDE 2 MG/1
2 TABLET ORAL EVERY 4 HOURS PRN
Status: DISCONTINUED | OUTPATIENT
Start: 2021-12-27 | End: 2021-12-27 | Stop reason: HOSPADM

## 2021-12-27 RX ORDER — HYDROMORPHONE HCL/PF 1 MG/ML
0.5 SYRINGE (ML) INJECTION ONCE
Status: COMPLETED | OUTPATIENT
Start: 2021-12-27 | End: 2021-12-27

## 2021-12-27 RX ORDER — ONDANSETRON 2 MG/ML
4 INJECTION INTRAMUSCULAR; INTRAVENOUS EVERY 4 HOURS PRN
Status: DISCONTINUED | OUTPATIENT
Start: 2021-12-27 | End: 2021-12-27 | Stop reason: HOSPADM

## 2021-12-27 RX ORDER — ATORVASTATIN CALCIUM 20 MG/1
80 TABLET, FILM COATED ORAL
Status: DISCONTINUED | OUTPATIENT
Start: 2021-12-27 | End: 2021-12-27 | Stop reason: HOSPADM

## 2021-12-27 RX ORDER — CARVEDILOL 25 MG/1
50 TABLET ORAL 2 TIMES DAILY WITH MEALS
Status: DISCONTINUED | OUTPATIENT
Start: 2021-12-27 | End: 2021-12-27

## 2021-12-27 RX ORDER — ACETAMINOPHEN 325 MG/1
975 TABLET ORAL EVERY 8 HOURS PRN
Qty: 30 TABLET | Refills: 0 | Status: SHIPPED | OUTPATIENT
Start: 2021-12-27

## 2021-12-27 RX ORDER — GLYBURIDE 5 MG/1
5 TABLET ORAL
Status: DISCONTINUED | OUTPATIENT
Start: 2021-12-28 | End: 2021-12-27 | Stop reason: HOSPADM

## 2021-12-27 RX ORDER — ACETAMINOPHEN 325 MG/1
975 TABLET ORAL EVERY 8 HOURS SCHEDULED
Status: DISCONTINUED | OUTPATIENT
Start: 2021-12-27 | End: 2021-12-27 | Stop reason: HOSPADM

## 2021-12-27 RX ORDER — PANTOPRAZOLE SODIUM 40 MG/1
40 TABLET, DELAYED RELEASE ORAL 2 TIMES DAILY
Status: DISCONTINUED | OUTPATIENT
Start: 2021-12-27 | End: 2021-12-27 | Stop reason: HOSPADM

## 2021-12-27 RX ORDER — CARVEDILOL 25 MG/1
50 TABLET ORAL 2 TIMES DAILY WITH MEALS
Status: DISCONTINUED | OUTPATIENT
Start: 2021-12-27 | End: 2021-12-27 | Stop reason: HOSPADM

## 2021-12-27 RX ORDER — HYDROMORPHONE HYDROCHLORIDE 2 MG/1
2 TABLET ORAL EVERY 4 HOURS PRN
Qty: 30 TABLET | Refills: 0 | Status: SHIPPED | OUTPATIENT
Start: 2021-12-27 | End: 2021-12-27

## 2021-12-27 RX ORDER — METHOCARBAMOL 750 MG/1
750 TABLET, FILM COATED ORAL EVERY 6 HOURS SCHEDULED
Status: DISCONTINUED | OUTPATIENT
Start: 2021-12-27 | End: 2021-12-27 | Stop reason: HOSPADM

## 2021-12-27 RX ORDER — HYDROMORPHONE HYDROCHLORIDE 2 MG/1
2 TABLET ORAL EVERY 4 HOURS PRN
Qty: 30 TABLET | Refills: 0 | Status: SHIPPED | OUTPATIENT
Start: 2021-12-27 | End: 2022-01-06

## 2021-12-27 RX ORDER — ACETAMINOPHEN 325 MG/1
975 TABLET ORAL EVERY 8 HOURS PRN
Status: DISCONTINUED | OUTPATIENT
Start: 2021-12-27 | End: 2021-12-27

## 2021-12-27 RX ORDER — SODIUM CHLORIDE, SODIUM GLUCONATE, SODIUM ACETATE, POTASSIUM CHLORIDE, MAGNESIUM CHLORIDE, SODIUM PHOSPHATE, DIBASIC, AND POTASSIUM PHOSPHATE .53; .5; .37; .037; .03; .012; .00082 G/100ML; G/100ML; G/100ML; G/100ML; G/100ML; G/100ML; G/100ML
500 INJECTION, SOLUTION INTRAVENOUS ONCE
Status: COMPLETED | OUTPATIENT
Start: 2021-12-27 | End: 2021-12-27

## 2021-12-27 RX ORDER — AMLODIPINE BESYLATE 10 MG/1
10 TABLET ORAL DAILY
Status: DISCONTINUED | OUTPATIENT
Start: 2021-12-27 | End: 2021-12-27 | Stop reason: HOSPADM

## 2021-12-27 RX ORDER — ASPIRIN 81 MG/1
81 TABLET ORAL DAILY
Status: DISCONTINUED | OUTPATIENT
Start: 2021-12-27 | End: 2021-12-27 | Stop reason: HOSPADM

## 2021-12-27 RX ORDER — ISOSORBIDE MONONITRATE 60 MG/1
60 TABLET, EXTENDED RELEASE ORAL DAILY
Status: DISCONTINUED | OUTPATIENT
Start: 2021-12-27 | End: 2021-12-27

## 2021-12-27 RX ORDER — METHOCARBAMOL 750 MG/1
750 TABLET, FILM COATED ORAL EVERY 6 HOURS SCHEDULED
Qty: 40 TABLET | Refills: 0 | Status: SHIPPED | OUTPATIENT
Start: 2021-12-27

## 2021-12-27 RX ADMIN — METHOCARBAMOL TABLETS 750 MG: 750 TABLET, COATED ORAL at 13:38

## 2021-12-27 RX ADMIN — PANTOPRAZOLE SODIUM 40 MG: 40 TABLET, DELAYED RELEASE ORAL at 10:46

## 2021-12-27 RX ADMIN — ASPIRIN 81 MG: 81 TABLET, COATED ORAL at 13:38

## 2021-12-27 RX ADMIN — IOHEXOL 100 ML: 350 INJECTION, SOLUTION INTRAVENOUS at 08:51

## 2021-12-27 RX ADMIN — CARVEDILOL 50 MG: 25 TABLET, FILM COATED ORAL at 13:38

## 2021-12-27 RX ADMIN — ACETAMINOPHEN 975 MG: 325 TABLET, FILM COATED ORAL at 13:38

## 2021-12-27 RX ADMIN — HYDROMORPHONE HYDROCHLORIDE 2 MG: 2 TABLET ORAL at 10:47

## 2021-12-27 RX ADMIN — HYDROMORPHONE HYDROCHLORIDE 0.5 MG: 1 INJECTION, SOLUTION INTRAMUSCULAR; INTRAVENOUS; SUBCUTANEOUS at 06:18

## 2021-12-27 RX ADMIN — ONDANSETRON 4 MG: 2 INJECTION INTRAMUSCULAR; INTRAVENOUS at 06:19

## 2021-12-27 RX ADMIN — HYDROMORPHONE HYDROCHLORIDE 0.5 MG: 1 INJECTION, SOLUTION INTRAMUSCULAR; INTRAVENOUS; SUBCUTANEOUS at 07:33

## 2021-12-27 RX ADMIN — SODIUM CHLORIDE, SODIUM GLUCONATE, SODIUM ACETATE, POTASSIUM CHLORIDE, MAGNESIUM CHLORIDE, SODIUM PHOSPHATE, DIBASIC, AND POTASSIUM PHOSPHATE 500 ML: .53; .5; .37; .037; .03; .012; .00082 INJECTION, SOLUTION INTRAVENOUS at 06:19

## 2021-12-27 RX ADMIN — AMLODIPINE BESYLATE 10 MG: 10 TABLET ORAL at 13:38

## 2021-12-27 RX ADMIN — APIXABAN 5 MG: 5 TABLET, FILM COATED ORAL at 13:38

## 2021-12-27 NOTE — ED ATTENDING ATTESTATION
12/27/2021  IEzequiel DO, saw and evaluated the patient  I have discussed the patient with the resident/non-physician practitioner and agree with the resident's/non-physician practitioner's findings, Plan of Care, and MDM as documented in the resident's/non-physician practitioner's note, except where noted  All available labs and Radiology studies were reviewed  I was present for key portions of any procedure(s) performed by the resident/non-physician practitioner and I was immediately available to provide assistance  At this point I agree with the current assessment done in the Emergency Department  I have conducted an independent evaluation of this patient a history and physical is as follows:    57-year-old male with recent diagnosis of SMA thrombosis of still pancreatitis seen on 16th December and discharged yesterday presents for worsening abdominal pain  Was getting to lot of the hospital states that now that he is getting only oxycodone his pain is getting worse  Seemed like it was getting worse yesterday  Right upper quadrant epigastric pain does not radiate  Severe  No vomiting  No other modifying factors or associated symptoms  He is tender in the epigastrium  Plan repeat labs to evaluate for recurrent pancreatitis last lipase was within normal limits    IV pain control reassess for disposition, consult surgery    ED Course         Critical Care Time  Procedures

## 2021-12-27 NOTE — CONSULTS
Consultation - General Surgery   Ely Jang 60 y o  male MRN: [id number]  Unit/Bed#: ED 23 Encounter: [id number]    Assessment/Plan   Recently discharged, returning with worsening abdominal pain  Chronic pain Left shoulder    Assessment:  Pain  Pancreatitis  Upper epigastric pain     Plan:  Repeat CT abdomen - completed and stable  Labs - completed and stable  Pain control  Diet restrictions  Return to facility  History of Present Illness   History, ROS and PFSH obtainable from chart and sister, plus records from facility  On our service previously  HPI:  Ely Jang is a 60 y o  male who presents with complaint of increased abdominal pain, states" worse I have had"  Describes it more epigastric up to his shoulder  Has chronic pain in left shoulder for which he takes Oxycodone  Abdomen is soft to palpation and no complaint of reflux at this time  No chest pain or abnormal heart rate  B/P elevated but has not received any of his medications  No shortness of breath noted  Urinating without difficulty, and a diet ordered  Discussed no greasey or fatty foods for awhile  Left UE is immobile this is pre-existing and is on chronic pain meds for it  Per patient there was an issue with prescriptions when he left the hospital yesterday  Pulses are palpable, he is cognizant as to what is going on and the plan of care we set up      Consults: Red surgery    Review of Systems    Historical Information   Past Medical History:   Diagnosis Date    CAD (coronary artery disease)     Chronic pain disorder     neck    CPAP (continuous positive airway pressure) dependence     non compliant    Diabetes mellitus (HCC)     NIDDM    Esophagitis     GERD (gastroesophageal reflux disease)     Heart abnormality     upper ventricle hole     Hyperlipidemia     Hypertension     Sleep apnea     Spinal stenosis     neck radiates into R hand    TIA (transient ischemic attack)      Past Surgical History: Procedure Laterality Date    CHOLECYSTECTOMY LAPAROSCOPIC N/A 12/4/2018    Procedure: CHOLECYSTECTOMY LAPAROSCOPIC;  Surgeon: Mikhail Fonseca MD;  [facility];  Service: General    ESOPHAGOGASTRODUODENOSCOPY N/A 12/31/2018    Procedure: ESOPHAGOGASTRODUODENOSCOPY (EGD); Surgeon: Elham Watson MD;  [facility]; Service: Gastroenterology    HERNIA REPAIR      LA ESOPHAGOGASTRODUODENOSCOPY TRANSORAL DIAGNOSTIC N/A 4/18/2019    Procedure: ESOPHAGOGASTRODUODENOSCOPY (EGD) with biopsy;  Surgeon: Elham Watson MD;  [facility]; Service: Gastroenterology    WISDOM TOOTH EXTRACTION       Social History   Social History     Substance and Sexual Activity   Alcohol Use Yes    Comment: social     Social History     Substance and Sexual Activity   Drug Use No     E-Cigarette/Vaping     E-Cigarette/Vaping Substances     Social History     Tobacco Use   Smoking Status Never Smoker   Smokeless Tobacco Never Used     Family History: non-contributory    Meds/Allergies   all current active meds have been reviewed  No Known Allergies    Objective   First Vitals:   Blood Pressure: 146/100 (12/27/21 0531)  Pulse: 95 (12/27/21 0531)  Temperature: 98 2 °F (36 8 °C) (12/27/21 0531)  Temp Source: Oral (12/27/21 0531)  Respirations: 20 (12/27/21 0531)  SpO2: 97 % (12/27/21 0531)    Current Vitals:   Blood Pressure: (!) 164/113 (12/27/21 1030)  Pulse: 94 (12/27/21 0831)  Temperature: 98 2 °F (36 8 °C) (12/27/21 0531)  Temp Source: Oral (12/27/21 0531)  Respirations: 18 (12/27/21 1030)  SpO2: 98 % (12/27/21 1030)      Intake/Output Summary (Last 24 hours) at 12/27/2021 1117  Last data filed at 12/27/2021 1048  Gross per 24 hour   Intake 500 ml   Output --   Net 500 ml       Invasive Devices  Report    Peripheral Intravenous Line            Peripheral IV 12/27/21 Right Antecubital <1 day                Physical Exam    Lab Results:  Results for Aileen Silvestre (MRN [id number]) as of 12/27/2021 11:18   Ref  Range 12/27/2021 06:18   Sodium Latest Ref Range: 136 - 145 mmol/L 138   Potassium Latest Ref Range: 3 5 - 5 3 mmol/L 3 6   Chloride Latest Ref Range: 100 - 108 mmol/L 104   CO2 Latest Ref Range: 21 - 32 mmol/L 29   Anion Gap Latest Ref Range: 4 - 13 mmol/L 5   BUN Latest Ref Range: 5 - 25 mg/dL 18   Creatinine Latest Ref Range: 0 60 - 1 30 mg/dL 1 10   Glucose, Random Latest Ref Range: 65 - 140 mg/dL 170 (H)   Calcium Latest Ref Range: 8 3 - 10 1 mg/dL 8 9   CORRECTED CALCIUM Latest Ref Range: 8 3 - 10 1 mg/dL 9 5   AST Latest Ref Range: 5 - 45 U/L 18   ALT Latest Ref Range: 12 - 78 U/L 41   Alkaline Phosphatase Latest Ref Range: 46 - 116 U/L 141 (H)   Total Protein Latest Ref Range: 6 4 - 8 2 g/dL 6 8   Albumin Latest Ref Range: 3 5 - 5 0 g/dL 3 3 (L)   TOTAL BILIRUBIN Latest Ref Range: 0 20 - 1 00 mg/dL 0 59   eGFR Latest Units: ml/min/1 73sq m 73   Lipase Latest Ref Range: 73 - 393 u/L 174   LACTIC ACID Latest Ref Range: 0 5 - 2 0 mmol/L 1 4   WBC Latest Ref Range: 4 31 - 10 16 Thousand/uL 9 76   Red Blood Cell Count Latest Ref Range: 3 88 - 5 62 Million/uL 4 76   Hemoglobin Latest Ref Range: 12 0 - 17 0 g/dL 14 5   HCT Latest Ref Range: 36 5 - 49 3 % 43 0   MCV Latest Ref Range: 82 - 98 fL 90   MCH Latest Ref Range: 26 8 - 34 3 pg 30 5   MCHC Latest Ref Range: 31 4 - 37 4 g/dL 33 7   RDW Latest Ref Range: 11 6 - 15 1 % 13 2   Platelet Count Latest Ref Range: 149 - 390 Thousands/uL 317   MPV Latest Ref Range: 8 9 - 12 7 fL 9 4   nRBC Latest Units: /100 WBCs 0   Neutrophils % Latest Ref Range: 43 - 75 % 65   Immat GRANS % Latest Ref Range: 0 - 2 % 1   Lymphocytes Relative Latest Ref Range: 14 - 44 % 22   Monocytes Relative Latest Ref Range: 4 - 12 % 7   Eosinophils Latest Ref Range: 0 - 6 % 4   Basophils Relative Latest Ref Range: 0 - 1 % 1   Immature Grans Absolute Latest Ref Range: 0 00 - 0 20 Thousand/uL 0 05   Absolute Neutrophils Latest Ref Range: 1 85 - 7 62 Thousands/µL 6 43   Lymphocytes Absolute Latest Ref Range: 0 60 - 4 47 Thousands/µL 2 19   Absolute Monocytes Latest Ref Range: 0 17 - 1 22 Thousand/µL 0 64   Absolute Eosinophils Latest Ref Range: 0 00 - 0 61 Thousand/µL 0 37   Basophils Absolute Latest Ref Range: 0 00 - 0 10 Thousands/µL 0 08     Imaging: CT A/P -  Continued findings of acute pancreatitis  Peripancreatic inflammatory changes have mildly improved since the prior exam   Continued presence of peripancreatic fluid collection measuring 3 1 cm in maximal dimension, mildly decreased in size but more well-defined since the previous exam; findings are nonspecific and could reflect developing pseudocyst or abscess  Continued follow-up to resolution is recommended  EKG, Pathology, and Other Studies: none    Counseling / Coordination of Care  Total floor / unit time spent today 31 minutes  Greater than 50% of total time was spent with the patient and / or family counseling and / or coordination of care  A description of the counseling / coordination of care: 10 minutes with nurse  Plus phone call to sister Katherin Bach to update her on plan of care

## 2021-12-27 NOTE — ED CARE HANDOFF
Emergency Department Sign Out Note        Sign out and transfer of care from Dr Alexus Meng  See Separate Emergency Department note  The patient, Marbin Baeza, was evaluated by the previous provider for abdominal pain  Workup Completed:  labs    ED Course / Workup Pending (followup):  Surgery evaluation                                  ED Course as of 12/27/21 1338   Mon Dec 27, 2021   0658 59 yo hx of pancreatitis  [ ] surgery evaluation, d/c   0815 General surgery evaluated patient  Patient is currently sleeping, but they ordered a CT abdomen pelvis to evaluate whether or not there was an collection around the pancreas  Will follow up on this CT scan and discuss with surgery   0931 Relayed patient's findings of continued acute pancreatitis to General surgery  21  Texted surgery again for follow up   1049 Surgery saw patient, want to see if he can eat and then would d/c   1329 Pending low fat diet to come from nutrition     Procedures  MDM  Number of Diagnoses or Management Options  Acute pancreatitis  Epigastric pain  Diagnosis management comments: 52-year-old male with recent admission to surgery for acute pancreatitis  Patient has a normal lipase here, but does have a history of chronic pancreatitis so may still be having acute flare  Has required 2 doses of Dilaudid  General surgery is going to evaluate  Able to tolerate PO diet, general surgery discharged and gave appropriate return precautions         Amount and/or Complexity of Data Reviewed  Clinical lab tests: reviewed  Tests in the radiology section of CPT®: reviewed  Tests in the medicine section of CPT®: reviewed  Discuss the patient with other providers: yes            Disposition  Final diagnoses:   Acute pancreatitis   Epigastric pain     Time reflects when diagnosis was documented in both MDM as applicable and the Disposition within this note     Time User Action Codes Description Comment    12/27/2021 10:24 AM Kourtney Savage [K85 10] Acute biliary pancreatitis, unspecified complication status     65/37/7485 10:25 AM aMrek Valdes Remove [K85 10] Acute biliary pancreatitis, unspecified complication status     21/99/6957 10:25 AM Loye Medardo Add [K85 90] Acute pancreatitis     12/27/2021 10:25 AM Loye Medardo Modify [K85 90] Acute pancreatitis     12/27/2021 11:32 AM Henri Minium Add [K81 9] Cholecystitis     12/27/2021 11:32 AM Henri Minium Modify [K81 9] Cholecystitis     12/27/2021 11:33 AM Henri Minium Modify [K81 9] Cholecystitis     12/27/2021 11:33 AM Henri Minium Add [K85 90] Acute pancreatitis, unspecified complication status, unspecified pancreatitis type     12/27/2021 11:33 AM Henri Minium Modify [K81 9] Cholecystitis     12/27/2021 11:33 AM Henri Minium Modify [K85 90] Acute pancreatitis, unspecified complication status, unspecified pancreatitis type     12/27/2021 11:33 AM Henri Minium Modify [K81 9] Cholecystitis     12/27/2021 11:33 AM Henri Minium Modify [K85 90] Acute pancreatitis, unspecified complication status, unspecified pancreatitis type     12/27/2021  1:34 PM Henri Minium Add [R10 13] Epigastric pain       ED Disposition     ED Disposition Condition Date/Time Comment    Discharge Stable Mon Dec 27, 2021  1:37 PM Ector Chambers discharge to home/self care              Follow-up Information    None       Current Discharge Medication List      START taking these medications    Details   HYDROmorphone (DILAUDID) 2 mg tablet Take 1 tablet (2 mg total) by mouth every 4 (four) hours as needed for moderate pain or severe pain for up to 10 days Max Daily Amount: 12 mg  Qty: 30 tablet, Refills: 0    Associated Diagnoses: Acute pancreatitis         CONTINUE these medications which have CHANGED    Details   acetaminophen (TYLENOL) 325 mg tablet Take 3 tablets (975 mg total) by mouth every 8 (eight) hours as needed for mild pain  Qty: 30 tablet, Refills: 0    Associated Diagnoses: Cholecystitis      metFORMIN (GLUCOPHAGE) 500 mg tablet Take 1 tablet (500 mg total) by mouth 2 (two) times a day with meals  Qty: 30 tablet, Refills: 0    Associated Diagnoses: Acute pancreatitis      methocarbamol (Robaxin-750) 750 mg tablet Take 1 tablet (750 mg total) by mouth every 6 (six) hours  Qty: 40 tablet, Refills: 0    Associated Diagnoses: Acute pancreatitis         CONTINUE these medications which have NOT CHANGED    Details   amLODIPine (NORVASC) 10 mg tablet Take 10 mg by mouth daily      apixaban (ELIQUIS) 5 mg Take 5 mg by mouth 2 (two) times a day      aspirin (ECOTRIN LOW STRENGTH) 81 mg EC tablet Take 81 mg by mouth daily      atorvastatin (LIPITOR) 80 mg tablet Take 80 mg by mouth daily at bedtime        carvedilol (COREG) 25 mg tablet Take 50 mg by mouth 2 (two) times a day with meals        glyBURIDE (DIABETA) 5 mg tablet Take 5 mg by mouth daily with breakfast      isosorbide mononitrate (IMDUR) 60 mg 24 hr tablet Take 60 mg by mouth daily      lisinopril (ZESTRIL) 40 mg tablet Take 40 mg by mouth daily        mirtazapine (REMERON) 15 mg tablet Take 15 mg by mouth daily at bedtime      pantoprazole (PROTONIX) 40 mg tablet Take 1 tablet (40 mg total) by mouth 2 (two) times a day for 30 days  Qty: 60 tablet, Refills: 2    Associated Diagnoses: Gastroesophageal reflux disease with esophagitis      senna-docusate sodium (SENOKOT S) 8 6-50 mg per tablet Take 1 tablet by mouth daily at bedtime  Qty: 10 tablet, Refills: 0    Associated Diagnoses: Acute pancreatitis, unspecified complication status, unspecified pancreatitis type      spironolactone (ALDACTONE) 50 mg tablet Take 50 mg by mouth daily           STOP taking these medications       oxyCODONE (ROXICODONE) 5 immediate release tablet Comments:   Reason for Stopping:             Outpatient Discharge Orders   Discharge Diet     Discharge Condition:  Improving     Patient Aware of Diagnosis: Yes     Free of Communicable Disease:    Yes     Activity:  As [facility]          ED Provider  Electronically Signed by     Lona Bowles MD  12/27/21 8116

## 2021-12-27 NOTE — QUICK NOTE
Tolerated a low fat diet, urinating without difficulty and pain controlled  Will discharge back to facility

## 2021-12-27 NOTE — ED PROVIDER NOTES
History  Chief Complaint   Patient presents with    Abdominal Pain     per ems - patient resides at [facility], c/o severe abdominal pain  Was seen yesterday for same issue and feels he should have been d/c'd with dilaudid     59yoM hx of htn,cad, chronic pain, pancreatitis, recently admitted for acute pancreatitis/smv thrombosis and discharged yesterday, presenting due to abdominal pain  Patient states he ended up staying an extra day at the hospital due to persistent pain and was sent home with oxycodone  Says he took 2 doses of the medication today but the abdominal pain is more severe than before  Described 10/10 midabdominal pain no obvious aggravating factors, only slight improvement with oxycodone  Denies fever, chills, chest pain, dyspnea, nausea, urinary or bowel symptoms  Denies any alcohol or drug use since discharge  Prior to Admission Medications   Prescriptions Last Dose Informant Patient Reported?  Taking?   acetaminophen (TYLENOL) 325 mg tablet   No No   Sig: Take 3 tablets (975 mg total) by mouth every 8 (eight) hours as needed for mild pain   acetaminophen (TYLENOL) 325 mg tablet   No No   Sig: Take 3 tablets (975 mg total) by mouth every 8 (eight) hours as needed for mild pain   amLODIPine (NORVASC) 10 mg tablet   Yes No   Sig: Take 10 mg by mouth daily   apixaban (ELIQUIS) 5 mg   Yes No   Sig: Take 5 mg by mouth 2 (two) times a day   aspirin (ECOTRIN LOW STRENGTH) 81 mg EC tablet   Yes No   Sig: Take 81 mg by mouth daily   atorvastatin (LIPITOR) 80 mg tablet   Yes No   Sig: Take 80 mg by mouth daily at bedtime     carvedilol (COREG) 25 mg tablet   Yes No   Sig: Take 50 mg by mouth 2 (two) times a day with meals     glyBURIDE (DIABETA) 5 mg tablet   Yes No   Sig: Take 5 mg by mouth daily with breakfast   isosorbide mononitrate (IMDUR) 60 mg 24 hr tablet   Yes No   Sig: Take 60 mg by mouth daily   lisinopril (ZESTRIL) 40 mg tablet   Yes No   Sig: Take 40 mg by mouth daily     metFORMIN (GLUCOPHAGE) 500 mg tablet  Outside Facility (Specify) Yes Yes   Sig: Take 500 mg by mouth 2 (two) times a day with meals   metFORMIN (GLUCOPHAGE) 500 mg tablet   No No   Sig: Take 1 tablet (500 mg total) by mouth 2 (two) times a day with meals   methocarbamol (ROBAXIN) 750 mg tablet   No No   Sig: Take 1 tablet (750 mg total) by mouth every 6 (six) hours for 4 doses   mirtazapine (REMERON) 15 mg tablet   Yes No   Sig: Take 15 mg by mouth daily at bedtime   oxyCODONE (ROXICODONE) 5 immediate release tablet   No No   Sig: Take 0 5 tablets (2 5 mg total) by mouth every 4 (four) hours as needed for moderate pain for up to 10 days Max Daily Amount: 15 mg   pantoprazole (PROTONIX) 40 mg tablet   No No   Sig: Take 1 tablet (40 mg total) by mouth 2 (two) times a day for 30 days   senna-docusate sodium (SENOKOT S) 8 6-50 mg per tablet   No No   Sig: Take 1 tablet by mouth daily at bedtime   spironolactone (ALDACTONE) 50 mg tablet   Yes No   Sig: Take 50 mg by mouth daily        Facility-Administered Medications: None       Past Medical History:   Diagnosis Date    CAD (coronary artery disease)     Chronic pain disorder     neck    CPAP (continuous positive airway pressure) dependence     non compliant    Diabetes mellitus (HCC)     NIDDM    Esophagitis     GERD (gastroesophageal reflux disease)     Heart abnormality     upper ventricle hole     Hyperlipidemia     Hypertension     Sleep apnea     Spinal stenosis     neck radiates into R hand    TIA (transient ischemic attack)        Past Surgical History:   Procedure Laterality Date    CHOLECYSTECTOMY LAPAROSCOPIC N/A 12/4/2018    Procedure: CHOLECYSTECTOMY LAPAROSCOPIC;  Surgeon: Pito Mcneill MD;  Location: [facility];  Service: General    ESOPHAGOGASTRODUODENOSCOPY N/A 12/31/2018    Procedure: ESOPHAGOGASTRODUODENOSCOPY (EGD); Surgeon: Deepali Blackwood MD;  Location: [facility];   Service: Gastroenterology    HERNIA REPAIR      VA ESOPHAGOGASTRODUODENOSCOPY TRANSORAL DIAGNOSTIC N/A 4/18/2019    Procedure: ESOPHAGOGASTRODUODENOSCOPY (EGD) with biopsy;  Surgeon: Demli Estrada MD;  Location: [facility]; Service: Gastroenterology    WISDOM TOOTH EXTRACTION         Family History   Problem Relation Age of Onset    Hypertension Father      I have reviewed and agree with the history as documented  E-Cigarette/Vaping     E-Cigarette/Vaping Substances     Social History     Tobacco Use    Smoking status: Never Smoker    Smokeless tobacco: Never Used   Substance Use Topics    Alcohol use: Yes     Comment: social    Drug use: No        Review of Systems   Constitutional: Negative for fatigue and fever  HENT: Negative for congestion and trouble swallowing  Eyes: Negative for visual disturbance  Respiratory: Negative for cough, chest tightness and shortness of breath  Cardiovascular: Negative for chest pain  Gastrointestinal: Positive for abdominal pain  Negative for diarrhea, nausea and vomiting  Genitourinary: Negative for flank pain  Musculoskeletal: Negative for back pain and gait problem  Skin: Negative for rash and wound  Neurological: Negative for syncope and headaches  Psychiatric/Behavioral: Negative for agitation  All other systems reviewed and are negative  Physical Exam  ED Triage Vitals   Temperature Pulse Respirations Blood Pressure SpO2   12/27/21 0531 12/27/21 0531 12/27/21 0531 12/27/21 0531 12/27/21 0531   98 2 °F (36 8 °C) 95 20 146/100 97 %      Temp Source Heart Rate Source Patient Position - Orthostatic VS BP Location FiO2 (%)   12/27/21 0531 12/27/21 0531 -- 12/27/21 0831 --   Oral Monitor  Right arm       Pain Score       12/27/21 0531       10 - Worst Possible Pain             Orthostatic Vital Signs  Vitals:    12/27/21 0831 12/27/21 1030 12/27/21 1223 12/27/21 1338   BP: (!) 166/117 (!) 164/113 137/96 129/74   Pulse: 94   90       Physical Exam  Vitals and nursing note reviewed     Constitutional:       General: He is in acute distress  Appearance: He is well-developed  He is not diaphoretic  HENT:      Head: Normocephalic and atraumatic  Right Ear: External ear normal       Left Ear: External ear normal    Eyes:      General:         Right eye: No discharge  Left eye: No discharge  Conjunctiva/sclera: Conjunctivae normal    Neck:      Vascular: No JVD  Trachea: No tracheal deviation  Cardiovascular:      Rate and Rhythm: Normal rate and regular rhythm  Heart sounds: Normal heart sounds  Pulmonary:      Effort: Pulmonary effort is normal       Breath sounds: Normal breath sounds  No wheezing  Abdominal:      General: There is no distension  Tenderness: There is abdominal tenderness  There is guarding  There is no rebound  Musculoskeletal:         General: Normal range of motion  Cervical back: Normal range of motion  Skin:     General: Skin is warm and dry  Neurological:      Mental Status: He is alert and oriented to person, place, and time  Psychiatric:         Mood and Affect: Mood normal          Speech: Speech normal          Behavior: Behavior normal          ED Medications  Medications   multi-electrolyte (ISOLYTE-S PH 7 4) bolus 500 mL (0 mL Intravenous Stopped 12/27/21 1048)   HYDROmorphone (DILAUDID) injection 0 5 mg (0 5 mg Intravenous Given 12/27/21 0618)   HYDROmorphone (DILAUDID) injection 0 5 mg (0 5 mg Intravenous Given 12/27/21 0733)   iohexol (OMNIPAQUE) 350 MG/ML injection (SINGLE-DOSE) 100 mL (100 mL Intravenous Given 12/27/21 0851)       Diagnostic Studies  Results Reviewed     Procedure Component Value Units Date/Time    Lactic acid, plasma [[id number]]  (Normal) Collected: 12/27/21 0618    Lab Status: Final result Specimen: Blood from Arm, Right Updated: 12/27/21 0709     LACTIC ACID 1 4 mmol/L     Narrative:      Result may be elevated if tourniquet was used during collection      Lipase [[id number]]  (Normal) Collected: 12/27/21 0618    Lab Status: Final result Specimen: Blood from Arm, Right Updated: 12/27/21 0650     Lipase 174 u/L     Comprehensive metabolic panel [[id number]]  (Abnormal) Collected: 12/27/21 0618    Lab Status: Final result Specimen: Blood from Arm, Right Updated: 12/27/21 0650     Sodium 138 mmol/L      Potassium 3 6 mmol/L      Chloride 104 mmol/L      CO2 29 mmol/L      ANION GAP 5 mmol/L      BUN 18 mg/dL      Creatinine 1 10 mg/dL      Glucose 170 mg/dL      Calcium 8 9 mg/dL      Corrected Calcium 9 5 mg/dL      AST 18 U/L      ALT 41 U/L      Alkaline Phosphatase 141 U/L      Total Protein 6 8 g/dL      Albumin 3 3 g/dL      Total Bilirubin 0 59 mg/dL      eGFR 73 ml/min/1 73sq m     Narrative:      [place] guidelines for Chronic Kidney Disease (CKD):     Stage 1 with normal or high GFR (GFR > 90 mL/min/1 73 square meters)    Stage 2 Mild CKD (GFR = 60-89 mL/min/1 73 square meters)    Stage 3A Moderate CKD (GFR = 45-59 mL/min/1 73 square meters)    Stage 3B Moderate CKD (GFR = 30-44 mL/min/1 73 square meters)    Stage 4 Severe CKD (GFR = 15-29 mL/min/1 73 square meters)    Stage 5 End Stage CKD (GFR <15 mL/min/1 73 square meters)  Note: GFR calculation is accurate only with a steady state creatinine    CBC and differential [[id number]] Collected: 12/27/21 0618    Lab Status: Final result Specimen: Blood from Arm, Right Updated: 12/27/21 0639     WBC 9 76 Thousand/uL      RBC 4 76 Million/uL      Hemoglobin 14 5 g/dL      Hematocrit 43 0 %      MCV 90 fL      MCH 30 5 pg      MCHC 33 7 g/dL      RDW 13 2 %      MPV 9 4 fL      Platelets 553 Thousands/uL      nRBC 0 /100 WBCs      Neutrophils Relative 65 %      Immat GRANS % 1 %      Lymphocytes Relative 22 %      Monocytes Relative 7 %      Eosinophils Relative 4 %      Basophils Relative 1 %      Neutrophils Absolute 6 43 Thousands/µL      Immature Grans Absolute 0 05 Thousand/uL      Lymphocytes Absolute 2 19 Thousands/µL      Monocytes Absolute 0 64 Thousand/µL      Eosinophils Absolute 0 37 Thousand/µL      Basophils Absolute 0 08 Thousands/µL                  CT abdomen pelvis w contrast   Final Result by Sahra Carrillo MD (12/27 5056)      1  Continued findings of acute pancreatitis  Peripancreatic inflammatory changes have mildly improved since the prior exam       Continued presence of peripancreatic fluid collection measuring 3 1 cm in maximal dimension, mildly decreased in size but more well-defined since the previous exam; findings are nonspecific and could reflect developing pseudocyst or abscess  Continued follow-up to resolution is recommended  Workstation performed: [id number]               Procedures  Procedures      ED Course  ED Course as of 12/29/21 1109   Mon Dec 27, 2021   0706 Gen surgery contacted, will see patient                             SBIRT 22yo+      Most Recent Value   SBIRT (23 yo +)    In order to provide better care to our patients, we are screening all of our patients for alcohol and drug use  Would it be okay to ask you these screening questions? No Filed at: 12/27/2021 6404                MDM  Number of Diagnoses or Management Options  Acute pancreatitis  Epigastric pain  Diagnosis management comments: Patient in no acute distress on arrival significantly tender abdomen  Patient was signed out to another resident at this time  Was provided a single dose of Dilaudid, as patient does have known history of pancreatitis and did have a delayed stay due to intractable pain  Per sign-out, patient was evaluated by the surgical team who eventually cleared patient for discharge and patient will continue his home pain medication with outpatient follow-up        Disposition  Final diagnoses:   Acute pancreatitis   Epigastric pain     Time reflects when diagnosis was documented in both MDM as applicable and the Disposition within this note     Time User Action Codes Description Comment    12/27/2021 10:24 AM Lucio Ok Abraham [K85 10] Acute biliary pancreatitis, unspecified complication status     36/08/3143 10:25 AM OleMarek schafer Betsey [K85 10] Acute biliary pancreatitis, unspecified complication status     08/71/5654 10:25 AM Norma Aurora Add [K85 90] Acute pancreatitis     12/27/2021 10:25 AM Norma Aurora Modify [K85 90] Acute pancreatitis     12/27/2021 11:32 AM Kim Ditto Add [K81 9] Cholecystitis     12/27/2021 11:32 AM Kim Ditto Modify [K81 9] Cholecystitis     12/27/2021 11:33 AM Kim Ditto Modify [K81 9] Cholecystitis     12/27/2021 11:33 AM Kim Ditto Add [K85 90] Acute pancreatitis, unspecified complication status, unspecified pancreatitis type     12/27/2021 11:33 AM Kim Ditto Modify [K81 9] Cholecystitis     12/27/2021 11:33 AM Kim Ditto Modify [K85 90] Acute pancreatitis, unspecified complication status, unspecified pancreatitis type     12/27/2021 11:33 AM Kim Ditto Modify [K81 9] Cholecystitis     12/27/2021 11:33 AM Kim Ditto Modify [K85 90] Acute pancreatitis, unspecified complication status, unspecified pancreatitis type     12/27/2021  1:34 PM Kmi Ditto Add [R10 13] Epigastric pain       ED Disposition     ED Disposition Condition Date/Time Comment    Discharge Stable Mon Dec 27, 2021  1:37 PM Arnold Shay discharge to home/self care              Follow-up Information    None         Discharge Medication List as of 12/27/2021  2:21 PM      CONTINUE these medications which have CHANGED    Details   acetaminophen (TYLENOL) 325 mg tablet Take 3 tablets (975 mg total) by mouth every 8 (eight) hours as needed for mild pain, Starting Mon 12/27/2021, Print      HYDROmorphone (DILAUDID) 2 mg tablet Take 1 tablet (2 mg total) by mouth every 4 (four) hours as needed for moderate pain or severe pain for up to 10 days Max Daily Amount: 12 mg, Starting Mon 12/27/2021, Until u 1/6/2022 at 2359, Print      metFORMIN (GLUCOPHAGE) 500 mg tablet Take 1 tablet (500 mg total) by mouth 2 (two) times a day with meals, Starting Wed 12/29/2021, Normal      methocarbamol (Robaxin-750) 750 mg tablet Take 1 tablet (750 mg total) by mouth every 6 (six) hours, Starting Mon 12/27/2021, Print         CONTINUE these medications which have NOT CHANGED    Details   amLODIPine (NORVASC) 10 mg tablet Take 10 mg by mouth daily, Historical Med      apixaban (ELIQUIS) 5 mg Take 5 mg by mouth 2 (two) times a day, Historical Med      aspirin (ECOTRIN LOW STRENGTH) 81 mg EC tablet Take 81 mg by mouth daily, Historical Med      atorvastatin (LIPITOR) 80 mg tablet Take 80 mg by mouth daily at bedtime  , Historical Med      carvedilol (COREG) 25 mg tablet Take 50 mg by mouth 2 (two) times a day with meals  , Historical Med      glyBURIDE (DIABETA) 5 mg tablet Take 5 mg by mouth daily with breakfast, Historical Med      isosorbide mononitrate (IMDUR) 60 mg 24 hr tablet Take 60 mg by mouth daily, Historical Med      lisinopril (ZESTRIL) 40 mg tablet Take 40 mg by mouth daily  , Historical Med      mirtazapine (REMERON) 15 mg tablet Take 15 mg by mouth daily at bedtime, Historical Med      pantoprazole (PROTONIX) 40 mg tablet Take 1 tablet (40 mg total) by mouth 2 (two) times a day for 30 days, Starting Tue 1/1/2019, Until Thu 4/18/2019, Normal      senna-docusate sodium (SENOKOT S) 8 6-50 mg per tablet Take 1 tablet by mouth daily at bedtime, Starting Fri 12/24/2021, No Print      spironolactone (ALDACTONE) 50 mg tablet Take 50 mg by mouth daily  , Historical Med         STOP taking these medications       oxyCODONE (ROXICODONE) 5 immediate release tablet Comments:   Reason for Stopping:             Outpatient Discharge Orders   Discharge Diet     Discharge Condition:  Improving     Patient Aware of Diagnosis: Yes     Free of Communicable Disease:    Yes     Activity:  As Tolerated       PDMP Review       Value Time User    PDMP Reviewed  Yes 12/27/2021 11:32 AM Augusta Joya, [address]           ED Provider  Attending physically available and evaluated Giovani George Ade Richard I managed the patient along with the ED Attending      Electronically Signed by         Tali Samano DO  12/29/21 3217

## 2021-12-27 NOTE — DISCHARGE SUMMARY
Discharge Summary - Thom Oshea 60 y o  male MRN: [id number]    Unit/Bed#: ED 23 Encounter: [id number]    Admission Date:     Admitting Diagnosis: Abdominal pain [R10 9]    HPI:  Thom Oshea is a 60 y o  male who presents with complaint of increased abdominal pain, states" worse I have had"  Describes it more epigastric up to his shoulder  Has chronic pain in left shoulder for which he takes Oxycodone  Abdomen is soft to palpation and no complaint of reflux at this time  No chest pain or abnormal heart rate  B/P elevated but has not received any of his medications  No shortness of breath noted  Urinating without difficulty, and a diet ordered  Discussed no greasey or fatty foods for awhile  Left UE is immobile this is pre-existing and is on chronic pain meds for it  Per patient there was an issue with prescriptions when he left the hospital yesterday  Pulses are palpable, he is cognizant as to what is going on and the plan of care we set up        Procedures Performed: No orders of the defined types were placed in this encounter  Summary of Hospital Course: 59 y/o male discharged to his facility yesterday returned with complaint of severe abdominal, epigastric pain  LAbs and scans performed, adjusted pain regimen  Given home meds and a diet  Spoke with his sister regarding pain control and diet  Will be discharged back to facility  For details of his stay, please refer to medical records  Significant Findings, Care, Treatment and Services Provided: CT abdomen pelvis w contrast    Result Date: 12/27/2021  Impression: 1  Continued findings of acute pancreatitis  Peripancreatic inflammatory changes have mildly improved since the prior exam  Continued presence of peripancreatic fluid collection measuring 3 1 cm in maximal dimension, mildly decreased in size but more well-defined since the previous exam; findings are nonspecific and could reflect developing pseudocyst or abscess  Continued follow-up to resolution is recommended  Workstation performed: [id number]         Complications: none    Discharge Diagnosis: Epigastric pain  Pancreatitis    Medical Problems             Resolved Problems  Date Reviewed: 2/8/2019    None                Condition at Discharge: stable         Discharge instructions/Information to patient and family:   See after visit summary for information provided to patient and family  Provisions for Follow-Up Care:  See after visit summary for information related to follow-up care and any pertinent home health orders  PCP: Char Early DO    Disposition: facility    Planned Readmission: No      Discharge Statement   I spent 30 minutes discharging the patient  This time was spent on the day of discharge  I had direct contact with the patient on the day of discharge  Additional documentation is required if more than 30 minutes were spent on discharge  Discharge Medications:  See after visit summary for reconciled discharge medications provided to patient and family

## 2021-12-27 NOTE — ED NOTES
[facility] [place]'s discharge, prescription, and changes to care  Awaiting transportation        Rahel Lu RN  12/27/21 6811

## 2022-01-12 ENCOUNTER — APPOINTMENT (EMERGENCY)
Dept: RADIOLOGY | Facility: HOSPITAL | Age: 57
End: 2022-01-12
Payer: COMMERCIAL

## 2022-01-12 ENCOUNTER — HOSPITAL ENCOUNTER (EMERGENCY)
Facility: HOSPITAL | Age: 57
Discharge: HOME | End: 2022-01-12
Attending: EMERGENCY MEDICINE | Admitting: FAMILY MEDICINE
Payer: COMMERCIAL

## 2022-01-12 VITALS
SYSTOLIC BLOOD PRESSURE: 107 MMHG | DIASTOLIC BLOOD PRESSURE: 74 MMHG | OXYGEN SATURATION: 97 % | TEMPERATURE: 97.5 F | HEART RATE: 71 BPM | RESPIRATION RATE: 16 BRPM

## 2022-01-12 DIAGNOSIS — R10.31 RIGHT LOWER QUADRANT ABDOMINAL PAIN: Primary | ICD-10-CM

## 2022-01-12 LAB
ALBUMIN SERPL BCP-MCNC: 3.8 G/DL (ref 3.5–5)
ALP SERPL-CCNC: 126 U/L (ref 46–116)
ALT SERPL W P-5'-P-CCNC: 26 U/L (ref 12–78)
ANION GAP SERPL CALCULATED.3IONS-SCNC: 5 MMOL/L (ref 4–13)
AST SERPL W P-5'-P-CCNC: 14 U/L (ref 5–45)
BASOPHILS # BLD AUTO: 0.08 THOUSANDS/ΜL (ref 0–0.1)
BASOPHILS NFR BLD AUTO: 1 % (ref 0–1)
BILIRUB SERPL-MCNC: 0.9 MG/DL (ref 0.2–1)
BILIRUB UR QL STRIP: NEGATIVE
BUN SERPL-MCNC: 20 MG/DL (ref 5–25)
CALCIUM SERPL-MCNC: 9.8 MG/DL (ref 8.3–10.1)
CHLORIDE SERPL-SCNC: 108 MMOL/L (ref 100–108)
CLARITY UR: CLEAR
CO2 SERPL-SCNC: 27 MMOL/L (ref 21–32)
COLOR UR: YELLOW
CREAT SERPL-MCNC: 1.13 MG/DL (ref 0.6–1.3)
EOSINOPHIL # BLD AUTO: 0.47 THOUSAND/ΜL (ref 0–0.61)
EOSINOPHIL NFR BLD AUTO: 6 % (ref 0–6)
ERYTHROCYTE [DISTWIDTH] IN BLOOD BY AUTOMATED COUNT: 13.3 % (ref 11.6–15.1)
GFR SERPL CREATININE-BSD FRML MDRD: 71 ML/MIN/1.73SQ M
GLUCOSE SERPL-MCNC: 148 MG/DL (ref 65–140)
GLUCOSE UR STRIP-MCNC: NEGATIVE MG/DL
HCT VFR BLD AUTO: 45.3 % (ref 36.5–49.3)
HGB BLD-MCNC: 15.4 G/DL (ref 12–17)
HGB UR QL STRIP.AUTO: NEGATIVE
IMM GRANULOCYTES # BLD AUTO: 0.04 THOUSAND/UL (ref 0–0.2)
IMM GRANULOCYTES NFR BLD AUTO: 1 % (ref 0–2)
KETONES UR STRIP-MCNC: NEGATIVE MG/DL
LACTATE SERPL-SCNC: 1.2 MMOL/L (ref 0.5–2)
LEUKOCYTE ESTERASE UR QL STRIP: NEGATIVE
LIPASE SERPL-CCNC: 148 U/L (ref 73–393)
LYMPHOCYTES # BLD AUTO: 2.17 THOUSANDS/ΜL (ref 0.6–4.47)
LYMPHOCYTES NFR BLD AUTO: 27 % (ref 14–44)
MCH RBC QN AUTO: 30.3 PG (ref 26.8–34.3)
MCHC RBC AUTO-ENTMCNC: 34 G/DL (ref 31.4–37.4)
MCV RBC AUTO: 89 FL (ref 82–98)
MONOCYTES # BLD AUTO: 0.59 THOUSAND/ΜL (ref 0.17–1.22)
MONOCYTES NFR BLD AUTO: 7 % (ref 4–12)
NEUTROPHILS # BLD AUTO: 4.72 THOUSANDS/ΜL (ref 1.85–7.62)
NEUTS SEG NFR BLD AUTO: 58 % (ref 43–75)
NITRITE UR QL STRIP: NEGATIVE
NRBC BLD AUTO-RTO: 0 /100 WBCS
PH UR STRIP.AUTO: 6 [PH]
PLATELET # BLD AUTO: 299 THOUSANDS/UL (ref 149–390)
PMV BLD AUTO: 9.5 FL (ref 8.9–12.7)
POTASSIUM SERPL-SCNC: 3.9 MMOL/L (ref 3.5–5.3)
PROT SERPL-MCNC: 7.8 G/DL (ref 6.4–8.2)
PROT UR STRIP-MCNC: NEGATIVE MG/DL
RBC # BLD AUTO: 5.08 MILLION/UL (ref 3.88–5.62)
SODIUM SERPL-SCNC: 140 MMOL/L (ref 136–145)
SP GR UR STRIP.AUTO: 1.02 (ref 1–1.03)
UROBILINOGEN UR QL STRIP.AUTO: 0.2 E.U./DL
WBC # BLD AUTO: 8.07 THOUSAND/UL (ref 4.31–10.16)

## 2022-01-12 PROCEDURE — G1004 CDSM NDSC: HCPCS

## 2022-01-12 PROCEDURE — 96376 TX/PRO/DX INJ SAME DRUG ADON: CPT

## 2022-01-12 PROCEDURE — 83690 ASSAY OF LIPASE: CPT | Performed by: STUDENT IN AN ORGANIZED HEALTH CARE EDUCATION/TRAINING PROGRAM

## 2022-01-12 PROCEDURE — 36415 COLL VENOUS BLD VENIPUNCTURE: CPT | Performed by: STUDENT IN AN ORGANIZED HEALTH CARE EDUCATION/TRAINING PROGRAM

## 2022-01-12 PROCEDURE — 99285 EMERGENCY DEPT VISIT HI MDM: CPT | Performed by: EMERGENCY MEDICINE

## 2022-01-12 PROCEDURE — 74177 CT ABD & PELVIS W/CONTRAST: CPT

## 2022-01-12 PROCEDURE — 85025 COMPLETE CBC W/AUTO DIFF WBC: CPT | Performed by: STUDENT IN AN ORGANIZED HEALTH CARE EDUCATION/TRAINING PROGRAM

## 2022-01-12 PROCEDURE — 81003 URINALYSIS AUTO W/O SCOPE: CPT | Performed by: STUDENT IN AN ORGANIZED HEALTH CARE EDUCATION/TRAINING PROGRAM

## 2022-01-12 PROCEDURE — 99285 EMERGENCY DEPT VISIT HI MDM: CPT

## 2022-01-12 PROCEDURE — 83605 ASSAY OF LACTIC ACID: CPT | Performed by: STUDENT IN AN ORGANIZED HEALTH CARE EDUCATION/TRAINING PROGRAM

## 2022-01-12 PROCEDURE — 80053 COMPREHEN METABOLIC PANEL: CPT | Performed by: STUDENT IN AN ORGANIZED HEALTH CARE EDUCATION/TRAINING PROGRAM

## 2022-01-12 PROCEDURE — 96374 THER/PROPH/DIAG INJ IV PUSH: CPT

## 2022-01-12 RX ORDER — HYDROMORPHONE HCL IN WATER/PF 6 MG/30 ML
0.2 PATIENT CONTROLLED ANALGESIA SYRINGE INTRAVENOUS ONCE
Status: COMPLETED | OUTPATIENT
Start: 2022-01-12 | End: 2022-01-12

## 2022-01-12 RX ADMIN — IOHEXOL 100 ML: 350 INJECTION, SOLUTION INTRAVENOUS at 06:17

## 2022-01-12 RX ADMIN — HYDROMORPHONE HYDROCHLORIDE 0.2 MG: 0.2 INJECTION, SOLUTION INTRAMUSCULAR; INTRAVENOUS; SUBCUTANEOUS at 05:29

## 2022-01-12 RX ADMIN — HYDROMORPHONE HYDROCHLORIDE 0.2 MG: 0.2 INJECTION, SOLUTION INTRAMUSCULAR; INTRAVENOUS; SUBCUTANEOUS at 09:20

## 2022-01-12 NOTE — ED PROVIDER NOTES
History  Chief Complaint   Patient presents with    Abdominal Pain     pt coming from [facility] stating "he has pancreatitis"      Patient is a 59 YO M, PMHx of CVA, DM, HTN, HLD, and recently diagnosed pancreatitis, who presents to the ED for abdominal pain  Patient states that the pain started earlier this evening  He describes the pain as sharp  It does not radiate  It is mainly located in the RLQ  Patient states he has been taking oxycodone for his pancreatitis, but it has not helped with the pain  There are no other modifying factors  Assocaited symptoms include nausea and decreased PO intake  He states this pain is NOT similar to his recent pancreatitis  He denies any fevers, chills, vomiting, diarrhea, chest pain, SOB, or any other new or concerning symptoms  In addition, patient is stating that he ran out of his PO dilaudid several days ago  He states that the oxycodone he has been taking has not really worked for the pancreatitis pain (in addition to this new RLQ pain)  History provided by:  Patient   used: No    Abdominal Pain  Associated symptoms: nausea    Associated symptoms: no chest pain, no chills, no cough, no diarrhea, no fever, no shortness of breath and no vomiting        Prior to Admission Medications   Prescriptions Last Dose Informant Patient Reported?  Taking?   acetaminophen (TYLENOL) 325 mg tablet   No No   Sig: Take 3 tablets (975 mg total) by mouth every 8 (eight) hours as needed for mild pain   amLODIPine (NORVASC) 10 mg tablet   Yes No   Sig: Take 10 mg by mouth daily   apixaban (ELIQUIS) 5 mg   Yes No   Sig: Take 5 mg by mouth 2 (two) times a day   aspirin (ECOTRIN LOW STRENGTH) 81 mg EC tablet   Yes No   Sig: Take 81 mg by mouth daily   atorvastatin (LIPITOR) 80 mg tablet   Yes No   Sig: Take 80 mg by mouth daily at bedtime     carvedilol (COREG) 25 mg tablet   Yes No   Sig: Take 50 mg by mouth 2 (two) times a day with meals     glyBURIDE (DIABETA) 5 mg tablet   Yes No   Sig: Take 5 mg by mouth daily with breakfast   isosorbide mononitrate (IMDUR) 60 mg 24 hr tablet   Yes No   Sig: Take 60 mg by mouth daily   lisinopril (ZESTRIL) 40 mg tablet   Yes No   Sig: Take 40 mg by mouth daily     metFORMIN (GLUCOPHAGE) 500 mg tablet   No No   Sig: Take 1 tablet (500 mg total) by mouth 2 (two) times a day with meals   methocarbamol (Robaxin-750) 750 mg tablet   No No   Sig: Take 1 tablet (750 mg total) by mouth every 6 (six) hours   mirtazapine (REMERON) 15 mg tablet   Yes No   Sig: Take 15 mg by mouth daily at bedtime   pantoprazole (PROTONIX) 40 mg tablet   No No   Sig: Take 1 tablet (40 mg total) by mouth 2 (two) times a day for 30 days   senna-docusate sodium (SENOKOT S) 8 6-50 mg per tablet   No No   Sig: Take 1 tablet by mouth daily at bedtime   spironolactone (ALDACTONE) 50 mg tablet   Yes No   Sig: Take 50 mg by mouth daily        Facility-Administered Medications: None       Past Medical History:   Diagnosis Date    CAD (coronary artery disease)     Chronic pain disorder     neck    CPAP (continuous positive airway pressure) dependence     non compliant    Diabetes mellitus (HCC)     NIDDM    Esophagitis     GERD (gastroesophageal reflux disease)     Heart abnormality     upper ventricle hole     Hyperlipidemia     Hypertension     Sleep apnea     Spinal stenosis     neck radiates into R hand    TIA (transient ischemic attack)        Past Surgical History:   Procedure Laterality Date    CHOLECYSTECTOMY LAPAROSCOPIC N/A 12/4/2018    Procedure: CHOLECYSTECTOMY LAPAROSCOPIC;  Surgeon: Sony David MD;  [facility];  Service: General    ESOPHAGOGASTRODUODENOSCOPY N/A 12/31/2018    Procedure: ESOPHAGOGASTRODUODENOSCOPY (EGD); Surgeon: Gisele Laguna MD;  Location: [facility];   Service: Gastroenterology    HERNIA REPAIR      MD ESOPHAGOGASTRODUODENOSCOPY TRANSORAL DIAGNOSTIC N/A 4/18/2019    Procedure: ESOPHAGOGASTRODUODENOSCOPY (EGD) with biopsy; Surgeon: Florentino Cavazos MD;  Location: [facility]; Service: Gastroenterology    WISDOM TOOTH EXTRACTION         Family History   Problem Relation Age of Onset    Hypertension Father      I have reviewed and agree with the history as documented  E-Cigarette/Vaping     E-Cigarette/Vaping Substances     Social History     Tobacco Use    Smoking status: Never Smoker    Smokeless tobacco: Never Used   Substance Use Topics    Alcohol use: Yes     Comment: social    Drug use: No        Review of Systems   Constitutional: Negative for chills and fever  Respiratory: Negative for cough and shortness of breath  Cardiovascular: Negative for chest pain  Gastrointestinal: Positive for abdominal pain and nausea  Negative for diarrhea and vomiting  All other systems reviewed and are negative  Physical Exam  ED Triage Vitals   Temperature Pulse Respirations Blood Pressure SpO2   01/12/22 0517 01/12/22 0513 01/12/22 0513 01/12/22 0513 01/12/22 0513   97 5 °F (36 4 °C) 71 16 107/74 97 %      Temp Source Heart Rate Source Patient Position - Orthostatic VS BP Location FiO2 (%)   01/12/22 0517 01/12/22 0513 -- -- --   Oral Monitor         Pain Score       01/12/22 0529       8             Orthostatic Vital Signs  Vitals:    01/12/22 0513   BP: 107/74   Pulse: 71       Physical Exam  Vitals and nursing note reviewed  Constitutional:       General: He is not in acute distress  Appearance: He is well-developed  He is not diaphoretic  HENT:      Head: Normocephalic and atraumatic  Right Ear: External ear normal       Left Ear: External ear normal       Nose: Nose normal    Eyes:      General: Lids are normal  No scleral icterus  Cardiovascular:      Rate and Rhythm: Normal rate and regular rhythm  Heart sounds: Normal heart sounds  No murmur heard  No friction rub  No gallop  Pulmonary:      Effort: Pulmonary effort is normal  No respiratory distress  Breath sounds: Normal breath sounds   No wheezing or rales  Abdominal:      Palpations: Abdomen is soft  Tenderness: There is abdominal tenderness in the right lower quadrant  There is no guarding or rebound  Musculoskeletal:         General: No deformity  Normal range of motion  Cervical back: Normal range of motion and neck supple  Skin:     General: Skin is warm and dry  Neurological:      Mental Status: He is alert  Psychiatric:         Mood and Affect: Mood normal          Behavior: Behavior normal          ED Medications  Medications   HYDROmorphone HCl (DILAUDID) injection 0 2 mg (0 2 mg Intravenous Given 1/12/22 0529)   iohexol (OMNIPAQUE) 350 MG/ML injection (SINGLE-DOSE) 100 mL (100 mL Intravenous Given 1/12/22 0617)   HYDROmorphone HCl (DILAUDID) injection 0 2 mg (0 2 mg Intravenous Given 1/12/22 0920)       Diagnostic Studies  Results Reviewed     Procedure Component Value Units Date/Time    UA w Reflex to Microscopic w Reflex to Culture [[id number]] Collected: 01/12/22 8612    Lab Status: Final result Specimen: Urine, Other Updated: 01/12/22 0829     Color, UA Yellow     Clarity, UA Clear     Specific Gravity, UA 1 025     pH, UA 6 0     Leukocytes, UA Negative     Nitrite, UA Negative     Protein, UA Negative mg/dl      Glucose, UA Negative mg/dl      Ketones, UA Negative mg/dl      Urobilinogen, UA 0 2 E U /dl      Bilirubin, UA Negative     Blood, UA Negative    Lactic acid, plasma [[id number]]  (Normal) Collected: 01/12/22 0528    Lab Status: Final result Specimen: Blood from Arm, Right Updated: 01/12/22 1316     LACTIC ACID 1 2 mmol/L     Narrative:      Result may be elevated if tourniquet was used during collection      Lipase [[id number]]  (Normal) Collected: 01/12/22 0528    Lab Status: Final result Specimen: Blood from Arm, Right Updated: 01/12/22 0603     Lipase 148 u/L     Comprehensive metabolic panel [[id number]]  (Abnormal) Collected: 01/12/22 0528    Lab Status: Final result Specimen: Blood from Arm, Right Updated: 01/12/22 0603     Sodium 140 mmol/L      Potassium 3 9 mmol/L      Chloride 108 mmol/L      CO2 27 mmol/L      ANION GAP 5 mmol/L      BUN 20 mg/dL      Creatinine 1 13 mg/dL      Glucose 148 mg/dL      Calcium 9 8 mg/dL      AST 14 U/L      ALT 26 U/L      Alkaline Phosphatase 126 U/L      Total Protein 7 8 g/dL      Albumin 3 8 g/dL      Total Bilirubin 0 90 mg/dL      eGFR 71 ml/min/1 73sq m     Narrative:      [place] guidelines for Chronic Kidney Disease (CKD):     Stage 1 with normal or high GFR (GFR > 90 mL/min/1 73 square meters)    Stage 2 Mild CKD (GFR = 60-89 mL/min/1 73 square meters)    Stage 3A Moderate CKD (GFR = 45-59 mL/min/1 73 square meters)    Stage 3B Moderate CKD (GFR = 30-44 mL/min/1 73 square meters)    Stage 4 Severe CKD (GFR = 15-29 mL/min/1 73 square meters)    Stage 5 End Stage CKD (GFR <15 mL/min/1 73 square meters)  Note: GFR calculation is accurate only with a steady state creatinine    CBC and differential [[id number]] Collected: 01/12/22 0528    Lab Status: Final result Specimen: Blood from Arm, Right Updated: 01/12/22 0539     WBC 8 07 Thousand/uL      RBC 5 08 Million/uL      Hemoglobin 15 4 g/dL      Hematocrit 45 3 %      MCV 89 fL      MCH 30 3 pg      MCHC 34 0 g/dL      RDW 13 3 %      MPV 9 5 fL      Platelets 653 Thousands/uL      nRBC 0 /100 WBCs      Neutrophils Relative 58 %      Immat GRANS % 1 %      Lymphocytes Relative 27 %      Monocytes Relative 7 %      Eosinophils Relative 6 %      Basophils Relative 1 %      Neutrophils Absolute 4 72 Thousands/µL      Immature Grans Absolute 0 04 Thousand/uL      Lymphocytes Absolute 2 17 Thousands/µL      Monocytes Absolute 0 59 Thousand/µL      Eosinophils Absolute 0 47 Thousand/µL      Basophils Absolute 0 08 Thousands/µL                  CT abdomen pelvis with contrast   Final Result by Mariah Ramirez MD (01/12 0720)      Improving subacute pancreatitis with decreasing peripancreatic inflammatory changes and smaller peripancreatic fluid collection  No evidence of new acute abdominopelvic process  Normal appendix  Workstation performed: [id number]               Procedures  Procedures      ED Course  ED Course as of 01/13/22 1303   Wed Jan 12, 2022   0541 WBC: 8 07   0541 Hemoglobin: 15 4   0541 Platelet Count: 038   0604 Sodium: 140   0604 Potassium: 3 9   0604 CO2: 27   0604 Lipase: 148   0636 LACTIC ACID: 1 2   0700 Patient signed out to Dr Caryl Falcon pending CT scan  MDM  Number of Diagnoses or Management Options  Right lower quadrant abdominal pain  Diagnosis management comments: Patient is a 60 y o  male who presents to the ED for RLQ abdominal pain  Abdominal exam without peritoneal signs  No evidence of acute abdomen at this time  Well appearing  Differential diagnosis includes: appendicitis, UTI, pyelonephritis, pancreatitis sequelae  Low suspicion for acute hepatobiliary disease (includng acute cholecystitis), PUD (including perforation), acute infectious processes (pneumonia, hepatitis), vascular catastrophe, bowel obstruction or viscus perforation  Presentation not consistent with other acute, emergent causes of abdominal pain at this time  Plan: labs, UA, CT AP, pain control, serial reassessment                 Portions of the record may have been created with voice recognition software  Occasional wrong word or "sound a like" substitutions may have occurred due to the inherent limitations of voice recognition software  Read the chart carefully and recognize, using context, where substitutions have occurred           Amount and/or Complexity of Data Reviewed  Clinical lab tests: ordered and reviewed  Tests in the radiology section of CPT®: ordered and reviewed  Tests in the medicine section of CPT®: ordered and reviewed  Independent visualization of images, tracings, or specimens: yes    Risk of Complications, Morbidity, and/or Mortality  Presenting problems: moderate  Diagnostic procedures: high  Management options: moderate    Patient Progress  Patient progress: stable      Disposition  Final diagnoses:   Right lower quadrant abdominal pain     Time reflects when diagnosis was documented in both MDM as applicable and the Disposition within this note     Time User Action Codes Description Comment    1/12/2022  6:36 AM Jeanne Jacobs Add [R10 31] Right lower quadrant abdominal pain       ED Disposition     ED Disposition Condition Date/Time Comment    Discharge Stable Wed Jan 12, 2022  8:30 AM Lennox Churchill discharge to home/self care              Follow-up Information     Follow up With Specialties Details Why Contact Info Additional 115 Jennifer Flood MD Internal Medicine   [facility] 71  for [address] [facility] [id number]  [facility] Emergency Department Emergency Medicine  As needed [address]  [address] [facility] 64 [facility] Emergency Department, [address], [facility] 108    [facility]ology Call   [address] [facility] [address] [facility] [address]  Paula Barrios [facility], [address], [address], [place], [place], [address]          Discharge Medication List as of 1/12/2022  8:30 AM      CONTINUE these medications which have NOT CHANGED    Details   acetaminophen (TYLENOL) 325 mg tablet Take 3 tablets (975 mg total) by mouth every 8 (eight) hours as needed for mild pain, Starting Mon 12/27/2021, Print      amLODIPine (NORVASC) 10 mg tablet Take 10 mg by mouth daily, Historical Med      apixaban (ELIQUIS) 5 mg Take 5 mg by mouth 2 (two) times a day, Historical Med      aspirin (ECOTRIN LOW STRENGTH) 81 mg EC tablet Take 81 mg by mouth daily, Historical Med      atorvastatin (LIPITOR) 80 mg tablet Take 80 mg by mouth daily at bedtime  , Historical Med      carvedilol (COREG) 25 mg tablet Take 50 mg by mouth 2 (two) times a day with meals  , Historical Med      glyBURIDE (DIABETA) 5 mg tablet Take 5 mg by mouth daily with breakfast, Historical Med      isosorbide mononitrate (IMDUR) 60 mg 24 hr tablet Take 60 mg by mouth daily, Historical Med      lisinopril (ZESTRIL) 40 mg tablet Take 40 mg by mouth daily  , Historical Med      metFORMIN (GLUCOPHAGE) 500 mg tablet Take 1 tablet (500 mg total) by mouth 2 (two) times a day with meals, Starting Wed 12/29/2021, Normal      methocarbamol (Robaxin-750) 750 mg tablet Take 1 tablet (750 mg total) by mouth every 6 (six) hours, Starting Mon 12/27/2021, Print      mirtazapine (REMERON) 15 mg tablet Take 15 mg by mouth daily at bedtime, Historical Med      pantoprazole (PROTONIX) 40 mg tablet Take 1 tablet (40 mg total) by mouth 2 (two) times a day for 30 days, Starting Tue 1/1/2019, Until Thu 4/18/2019, Normal      senna-docusate sodium (SENOKOT S) 8 6-50 mg per tablet Take 1 tablet by mouth daily at bedtime, Starting Fri 12/24/2021, No Print      spironolactone (ALDACTONE) 50 mg tablet Take 50 mg by mouth daily  , Historical Med           No discharge procedures on file  PDMP Review       Value Time User    PDMP Reviewed  Yes 12/27/2021 11:32 AM Augusta Hernandez, [address]            ED Provider  Attending physically available and evaluated Sae Levy I managed the patient along with the ED Attending      Electronically Signed by         Rey Maria DO  01/13/22 0236

## 2022-01-12 NOTE — ED ATTENDING ATTESTATION
1/12/2022  ILacie MD, saw and evaluated the patient  I have discussed the patient with the resident/non-physician practitioner and agree with the resident's/non-physician practitioner's findings, Plan of Care, and MDM as documented in the resident's/non-physician practitioner's note, except where noted  All available labs and Radiology studies were reviewed  I was present for key portions of any procedure(s) performed by the resident/non-physician practitioner and I was immediately available to provide assistance  At this point I agree with the current assessment done in the Emergency Department  I have conducted an independent evaluation of this patient a history and physical is as follows:    ED Course     Emergency Department Note- Elisabeth Aguilar 60 y o  male MRN: [id number]    [facility] Encounter: [id number]    Elisabeth Aguilar is a 60 y o  male who presents with   Chief Complaint   Patient presents with    Abdominal Pain     pt coming from [facility] stating "he has pancreatitis"          History of Present Illness   HPI:  Elisabeth Aguilar is a 60 y o  male who presents for evaluation of:  Acute right lower quadrant pain that flared at his rehabilitation facility  Pain is moderately severe, RLQ, throbbing  Moving does not provoke the pain but pressing on the abdomen worsens the pain  Review of Systems   Constitutional: Negative for chills and fever  HENT: Negative for congestion and rhinorrhea  Respiratory: Negative for cough and shortness of breath  Cardiovascular: Negative for chest pain and palpitations  Gastrointestinal: Positive for nausea  Negative for vomiting  Neurological: Negative for light-headedness and headaches  All other systems reviewed and are negative        Historical Information   Past Medical History:   Diagnosis Date    CAD (coronary artery disease)     Chronic pain disorder     neck    CPAP (continuous positive airway pressure) dependence     non compliant    Diabetes mellitus ([facility] [address] )     NIDDM    Esophagitis     GERD (gastroesophageal reflux disease)     Heart abnormality     upper ventricle hole     Hyperlipidemia     Hypertension     Sleep apnea     Spinal stenosis     neck radiates into R hand    TIA (transient ischemic attack)      Past Surgical History:   Procedure Laterality Date    CHOLECYSTECTOMY LAPAROSCOPIC N/A 12/4/2018    Procedure: CHOLECYSTECTOMY LAPAROSCOPIC;  Surgeon: Penelope Loza MD;  Location:  MAIN OR;  Service: General    ESOPHAGOGASTRODUODENOSCOPY N/A 12/31/2018    Procedure: ESOPHAGOGASTRODUODENOSCOPY (EGD); Surgeon: Delmi Estrada MD;  Location:  GI LAB; Service: Gastroenterology    HERNIA REPAIR      SC ESOPHAGOGASTRODUODENOSCOPY TRANSORAL DIAGNOSTIC N/A 4/18/2019    Procedure: ESOPHAGOGASTRODUODENOSCOPY (EGD) with biopsy;  Surgeon: Delmi Estrada MD;  Location: [facility]; Service: Gastroenterology    WISDOM TOOTH EXTRACTION       Social History   Social History     Substance and Sexual Activity   Alcohol Use Yes    Comment: social     Social History     Substance and Sexual Activity   Drug Use No     Social History     Tobacco Use   Smoking Status Never Smoker   Smokeless Tobacco Never Used     Family History:   Family History   Problem Relation Age of Onset    Hypertension Father        Meds/Allergies   PTA meds:   Prior to Admission Medications   Prescriptions Last Dose Informant Patient Reported?  Taking?   acetaminophen (TYLENOL) 325 mg tablet   No No   Sig: Take 3 tablets (975 mg total) by mouth every 8 (eight) hours as needed for mild pain   amLODIPine (NORVASC) 10 mg tablet   Yes No   Sig: Take 10 mg by mouth daily   apixaban (ELIQUIS) 5 mg   Yes No   Sig: Take 5 mg by mouth 2 (two) times a day   aspirin (ECOTRIN LOW STRENGTH) 81 mg EC tablet   Yes No   Sig: Take 81 mg by mouth daily   atorvastatin (LIPITOR) 80 mg tablet   Yes No   Sig: Take 80 mg by mouth daily at bedtime     carvedilol (COREG) 25 mg tablet   Yes No   Sig: Take 50 mg by mouth 2 (two) times a day with meals     glyBURIDE (DIABETA) 5 mg tablet   Yes No   Sig: Take 5 mg by mouth daily with breakfast   isosorbide mononitrate (IMDUR) 60 mg 24 hr tablet   Yes No   Sig: Take 60 mg by mouth daily   lisinopril (ZESTRIL) 40 mg tablet   Yes No   Sig: Take 40 mg by mouth daily     metFORMIN (GLUCOPHAGE) 500 mg tablet   No No   Sig: Take 1 tablet (500 mg total) by mouth 2 (two) times a day with meals   methocarbamol (Robaxin-750) 750 mg tablet   No No   Sig: Take 1 tablet (750 mg total) by mouth every 6 (six) hours   mirtazapine (REMERON) 15 mg tablet   Yes No   Sig: Take 15 mg by mouth daily at bedtime   pantoprazole (PROTONIX) 40 mg tablet   No No   Sig: Take 1 tablet (40 mg total) by mouth 2 (two) times a day for 30 days   senna-docusate sodium (SENOKOT S) 8 6-50 mg per tablet   No No   Sig: Take 1 tablet by mouth daily at bedtime   spironolactone (ALDACTONE) 50 mg tablet   Yes No   Sig: Take 50 mg by mouth daily        Facility-Administered Medications: None     No Known Allergies    Objective   First Vitals:   Blood Pressure: 107/74 (01/12/22 0513)  Pulse: 71 (01/12/22 0513)  Temperature: 97 5 °F (36 4 °C) (01/12/22 0517)  Temp Source: Oral (01/12/22 0517)  Respirations: 16 (01/12/22 0513)  SpO2: 97 % (01/12/22 0513)    Current Vitals:   Blood Pressure: 107/74 (01/12/22 0513)  Pulse: 71 (01/12/22 0513)  Temperature: 97 5 °F (36 4 °C) (01/12/22 0517)  Temp Source: Oral (01/12/22 0517)  Respirations: 16 (01/12/22 0513)  SpO2: 97 % (01/12/22 0513)    No intake or output data in the 24 hours ending 01/12/22 0553    Invasive Devices  Report    Peripheral Intravenous Line            Peripheral IV 01/12/22 Right Antecubital <1 day                Physical Exam  Vitals and nursing note reviewed  Constitutional:       General: He is not in acute distress  Appearance: Normal appearance  He is well-developed     HENT:      Head: Normocephalic and atraumatic  Right Ear: External ear normal       Left Ear: External ear normal       Nose: Nose normal       Mouth/Throat:      Pharynx: No oropharyngeal exudate  Eyes:      Conjunctiva/sclera: Conjunctivae normal       Pupils: Pupils are equal, round, and reactive to light  Cardiovascular:      Rate and Rhythm: Normal rate and regular rhythm  Pulmonary:      Effort: Pulmonary effort is normal  No respiratory distress  Abdominal:      General: Abdomen is flat  Bowel sounds are normal  There is no distension  Palpations: Abdomen is soft  Tenderness: There is abdominal tenderness in the right lower quadrant  Musculoskeletal:         General: No deformity  Normal range of motion  Cervical back: Normal range of motion and neck supple  Skin:     General: Skin is warm and dry  Capillary Refill: Capillary refill takes less than 2 seconds  Neurological:      Mental Status: He is alert and oriented to person, place, and time  Mental status is at baseline  Motor: Weakness (LUE and LLE weakness and contractures from prior CVA) present  Coordination: Coordination normal    Psychiatric:         Mood and Affect: Mood normal          Behavior: Behavior normal          Thought Content: Thought content normal          Judgment: Judgment normal            Medical Decision Makin   Acute abdominal pain: CTAP; CBC; CMP; pain controal    Recent Results (from the past 36 hour(s))   CBC and differential    Collection Time: 22  5:28 AM   Result Value Ref Range    WBC 8 07 4 31 - 10 16 Thousand/uL    RBC 5 08 3 88 - 5 62 Million/uL    Hemoglobin 15 4 12 0 - 17 0 g/dL    Hematocrit 45 3 36 5 - 49 3 %    MCV 89 82 - 98 fL    MCH 30 3 26 8 - 34 3 pg    MCHC 34 0 31 4 - 37 4 g/dL    RDW 13 3 11 6 - 15 1 %    MPV 9 5 8 9 - 12 7 fL    Platelets 650 316 - 770 Thousands/uL    nRBC 0 /100 WBCs    Neutrophils Relative 58 43 - 75 %    Immat GRANS % 1 0 - 2 %    Lymphocytes Relative 27 14 - 44 %    Monocytes Relative 7 4 - 12 %    Eosinophils Relative 6 0 - 6 %    Basophils Relative 1 0 - 1 %    Neutrophils Absolute 4 72 1 85 - 7 62 Thousands/µL    Immature Grans Absolute 0 04 0 00 - 0 20 Thousand/uL    Lymphocytes Absolute 2 17 0 60 - 4 47 Thousands/µL    Monocytes Absolute 0 59 0 17 - 1 22 Thousand/µL    Eosinophils Absolute 0 47 0 00 - 0 61 Thousand/µL    Basophils Absolute 0 08 0 00 - 0 10 Thousands/µL     CT abdomen pelvis with contrast    (Results Pending)         Portions of the record may have been created with voice recognition software  Occasional wrong word or "sound a like" substitutions may have occurred due to the inherent limitations of voice recognition software  Read the chart carefully and recognize, using context, where substitutions have occurred          Critical Care Time  Procedures

## 2022-01-12 NOTE — DISCHARGE INSTRUCTIONS
You have been evaluated in the Emergency Department today for abdominal pain  Your evaluation did not show evidence of medical conditions requiring emergent intervention at this time  Please schedule an appointment with your primary care physician  Return to the Emergency Department if you experience worsening or uncontrolled pain, fevers 100 4°F or greater, recurrent vomiting, inability to tolerate food or fluids by mouth, bloody stools or vomit, black or tarry stools, or any other concerning symptoms

## 2022-01-12 NOTE — ED NOTES
Report called to [facility] at this time  RN notified of tentative pickup time of 1000 and that no prescriptions will be sent back with the patient       Jimmie Ag RN  01/12/22 [id number]

## 2022-01-19 ENCOUNTER — TELEPHONE (OUTPATIENT)
Dept: HEMATOLOGY ONCOLOGY | Facility: CLINIC | Age: 57
End: 2022-01-19

## 2022-01-19 NOTE — TELEPHONE ENCOUNTER
Reschedule Appointment     Who is calling in  [place]   Doctor Appointment Scheduled with Claire Morales    Original date and time 1-27-22 @ 9:00am   New date and time 3-9-22 @ 1:00pm    Location [place]    Patient verbalized understanding

## 2022-03-07 ENCOUNTER — TELEPHONE (OUTPATIENT)
Dept: HEMATOLOGY ONCOLOGY | Facility: CLINIC | Age: 57
End: 2022-03-07

## 2022-03-07 NOTE — TELEPHONE ENCOUNTER
Appointment Cancellation Or Reschedule     Person calling in [facility]    Provider Rubio Mccray   Office Visit Date and Time 3/9/22 @ 1pm   Office Visit Location [place]   Did patient want to reschedule their office appointment? If so, when was it scheduled to? Yes 5/4/22 @ 8am   Is this patient calling to reschedule an infusion appointment? no   When is their next infusion appointment? Na   Is this patient a Chemo patient? no   Reason for Cancellation or Reschedule Patient unable to keep appt     If the patient is a treatment patient, please route this to the office nurse  If the patient is not on treatment, please route to the office MA

## 2022-03-16 ENCOUNTER — HOSPITAL ENCOUNTER (EMERGENCY)
Facility: HOSPITAL | Age: 57
Discharge: HOME | End: 2022-03-16
Attending: EMERGENCY MEDICINE
Payer: COMMERCIAL

## 2022-03-16 ENCOUNTER — APPOINTMENT (EMERGENCY)
Dept: RADIOLOGY | Facility: HOSPITAL | Age: 57
End: 2022-03-16
Payer: COMMERCIAL

## 2022-03-16 VITALS
TEMPERATURE: 97.6 F | RESPIRATION RATE: 17 BRPM | DIASTOLIC BLOOD PRESSURE: 68 MMHG | SYSTOLIC BLOOD PRESSURE: 113 MMHG | HEART RATE: 68 BPM | OXYGEN SATURATION: 96 %

## 2022-03-16 DIAGNOSIS — R29.90 STROKE-LIKE SYMPTOMS: ICD-10-CM

## 2022-03-16 DIAGNOSIS — R20.2 FACIAL PARESTHESIA: Primary | ICD-10-CM

## 2022-03-16 LAB
ALBUMIN SERPL BCP-MCNC: 3.4 G/DL (ref 3.5–5)
ALP SERPL-CCNC: 113 U/L (ref 46–116)
ALT SERPL W P-5'-P-CCNC: 25 U/L (ref 12–78)
ANION GAP SERPL CALCULATED.3IONS-SCNC: 5 MMOL/L (ref 4–13)
AST SERPL W P-5'-P-CCNC: 15 U/L (ref 5–45)
ATRIAL RATE: 76 BPM
BASOPHILS # BLD AUTO: 0.06 THOUSANDS/ΜL (ref 0–0.1)
BASOPHILS NFR BLD AUTO: 1 % (ref 0–1)
BILIRUB SERPL-MCNC: 1.3 MG/DL (ref 0.2–1)
BUN SERPL-MCNC: 14 MG/DL (ref 5–25)
CALCIUM ALBUM COR SERPL-MCNC: 9.8 MG/DL (ref 8.3–10.1)
CALCIUM SERPL-MCNC: 9.3 MG/DL (ref 8.3–10.1)
CHLORIDE SERPL-SCNC: 108 MMOL/L (ref 100–108)
CO2 SERPL-SCNC: 27 MMOL/L (ref 21–32)
CREAT SERPL-MCNC: 1.09 MG/DL (ref 0.6–1.3)
EOSINOPHIL # BLD AUTO: 0.41 THOUSAND/ΜL (ref 0–0.61)
EOSINOPHIL NFR BLD AUTO: 6 % (ref 0–6)
ERYTHROCYTE [DISTWIDTH] IN BLOOD BY AUTOMATED COUNT: 12.9 % (ref 11.6–15.1)
GFR SERPL CREATININE-BSD FRML MDRD: 74 ML/MIN/1.73SQ M
GLUCOSE SERPL-MCNC: 126 MG/DL (ref 65–140)
HCT VFR BLD AUTO: 42.9 % (ref 36.5–49.3)
HGB BLD-MCNC: 15 G/DL (ref 12–17)
IMM GRANULOCYTES # BLD AUTO: 0.01 THOUSAND/UL (ref 0–0.2)
IMM GRANULOCYTES NFR BLD AUTO: 0 % (ref 0–2)
LYMPHOCYTES # BLD AUTO: 2.46 THOUSANDS/ΜL (ref 0.6–4.47)
LYMPHOCYTES NFR BLD AUTO: 37 % (ref 14–44)
MCH RBC QN AUTO: 30.4 PG (ref 26.8–34.3)
MCHC RBC AUTO-ENTMCNC: 35 G/DL (ref 31.4–37.4)
MCV RBC AUTO: 87 FL (ref 82–98)
MONOCYTES # BLD AUTO: 0.52 THOUSAND/ΜL (ref 0.17–1.22)
MONOCYTES NFR BLD AUTO: 8 % (ref 4–12)
NEUTROPHILS # BLD AUTO: 3.19 THOUSANDS/ΜL (ref 1.85–7.62)
NEUTS SEG NFR BLD AUTO: 48 % (ref 43–75)
NRBC BLD AUTO-RTO: 0 /100 WBCS
P AXIS: 52 DEGREES
PLATELET # BLD AUTO: 256 THOUSANDS/UL (ref 149–390)
PMV BLD AUTO: 10.1 FL (ref 8.9–12.7)
POTASSIUM SERPL-SCNC: 3.6 MMOL/L (ref 3.5–5.3)
PR INTERVAL: 188 MS
PROT SERPL-MCNC: 7 G/DL (ref 6.4–8.2)
QRS AXIS: 32 DEGREES
QRSD INTERVAL: 138 MS
QT INTERVAL: 410 MS
QTC INTERVAL: 461 MS
RBC # BLD AUTO: 4.93 MILLION/UL (ref 3.88–5.62)
SODIUM SERPL-SCNC: 140 MMOL/L (ref 136–145)
T WAVE AXIS: 0 DEGREES
VENTRICULAR RATE: 76 BPM
WBC # BLD AUTO: 6.65 THOUSAND/UL (ref 4.31–10.16)

## 2022-03-16 PROCEDURE — 99285 EMERGENCY DEPT VISIT HI MDM: CPT | Performed by: EMERGENCY MEDICINE

## 2022-03-16 PROCEDURE — 85025 COMPLETE CBC W/AUTO DIFF WBC: CPT | Performed by: EMERGENCY MEDICINE

## 2022-03-16 PROCEDURE — 36415 COLL VENOUS BLD VENIPUNCTURE: CPT | Performed by: EMERGENCY MEDICINE

## 2022-03-16 PROCEDURE — 99285 EMERGENCY DEPT VISIT HI MDM: CPT

## 2022-03-16 PROCEDURE — G1004 CDSM NDSC: HCPCS

## 2022-03-16 PROCEDURE — 70450 CT HEAD/BRAIN W/O DYE: CPT

## 2022-03-16 PROCEDURE — 93010 ELECTROCARDIOGRAM REPORT: CPT | Performed by: INTERNAL MEDICINE

## 2022-03-16 PROCEDURE — 80053 COMPREHEN METABOLIC PANEL: CPT | Performed by: EMERGENCY MEDICINE

## 2022-03-16 PROCEDURE — 93005 ELECTROCARDIOGRAM TRACING: CPT

## 2022-03-16 RX ORDER — OXYCODONE HYDROCHLORIDE 5 MG/1
5 TABLET ORAL ONCE
Status: COMPLETED | OUTPATIENT
Start: 2022-03-16 | End: 2022-03-16

## 2022-03-16 RX ORDER — ASPIRIN 81 MG/1
81 TABLET, CHEWABLE ORAL DAILY
Qty: 30 TABLET | Refills: 0 | Status: SHIPPED | OUTPATIENT
Start: 2022-03-16 | End: 2022-04-15

## 2022-03-16 RX ADMIN — OXYCODONE HYDROCHLORIDE 5 MG: 5 TABLET ORAL at 16:38

## 2022-03-16 RX ADMIN — OXYCODONE HYDROCHLORIDE 5 MG: 5 TABLET ORAL at 14:37

## 2022-03-16 RX ADMIN — OXYCODONE HYDROCHLORIDE 5 MG: 5 TABLET ORAL at 18:49

## 2022-03-16 NOTE — ED NOTES
Sister -John Lopez  [phone] requesting update or can answer questions     Emperatriz Madrigal  03/16/22 [id number]

## 2022-03-16 NOTE — DISCHARGE INSTRUCTIONS
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

## 2022-03-16 NOTE — ED PROVIDER NOTES
History  Chief Complaint   Patient presents with    Facial Numbness     Pt reports L sided facial numbness that is new for 2 weeks  Also slurring  After seeing Dr dior pt came to ED  Previous stroke hx about 7 months ago  Lead to Left sided paralysis below the neck  49-year-old male history of CAD, diabetes, CVA with residual left upper and lower extremity motor deficits, presenting due to concern for stroke-like symptoms  Patient states symptoms started 2 weeks ago and he believes he had a left-sided facial droop/occasional slurring facial numbness  Says he saw his doctor today who referred him to the emergency department for evaluation  He denies any other areas of deficits been says he has retained sensation on his left-sided extremities and denies any change over the past 2 weeks  Denies fever, headache, neck pain, visual changes, aphasia, chest pain or dyspnea, nausea vomiting  Denies any injury or trauma  Patient is not on any blood thinning medication  Patient is poor historian and does not recall much about his prior stroke or changes in  antiplatelet medications  Prior to Admission Medications   Prescriptions Last Dose Informant Patient Reported?  Taking?   acetaminophen (TYLENOL) 325 mg tablet   No No   Sig: Take 3 tablets (975 mg total) by mouth every 8 (eight) hours as needed for mild pain   amLODIPine (NORVASC) 10 mg tablet   Yes No   Sig: Take 10 mg by mouth daily   apixaban (ELIQUIS) 5 mg   Yes No   Sig: Take 5 mg by mouth 2 (two) times a day   aspirin (ECOTRIN LOW STRENGTH) 81 mg EC tablet   Yes No   Sig: Take 81 mg by mouth daily   atorvastatin (LIPITOR) 80 mg tablet   Yes No   Sig: Take 80 mg by mouth daily at bedtime     carvedilol (COREG) 25 mg tablet   Yes No   Sig: Take 50 mg by mouth 2 (two) times a day with meals     glyBURIDE (DIABETA) 5 mg tablet   Yes No   Sig: Take 5 mg by mouth daily with breakfast   isosorbide mononitrate (IMDUR) 60 mg 24 hr tablet   Yes No   Sig: Take 60 mg by mouth daily   lisinopril (ZESTRIL) 40 mg tablet   Yes No   Sig: Take 40 mg by mouth daily     metFORMIN (GLUCOPHAGE) 500 mg tablet   No No   Sig: Take 1 tablet (500 mg total) by mouth 2 (two) times a day with meals   methocarbamol (Robaxin-750) 750 mg tablet   No No   Sig: Take 1 tablet (750 mg total) by mouth every 6 (six) hours   mirtazapine (REMERON) 15 mg tablet   Yes No   Sig: Take 15 mg by mouth daily at bedtime   pantoprazole (PROTONIX) 40 mg tablet   No No   Sig: Take 1 tablet (40 mg total) by mouth 2 (two) times a day for 30 days   senna-docusate sodium (SENOKOT S) 8 6-50 mg per tablet   No No   Sig: Take 1 tablet by mouth daily at bedtime   spironolactone (ALDACTONE) 50 mg tablet   Yes No   Sig: Take 50 mg by mouth daily        Facility-Administered Medications: None       Past Medical History:   Diagnosis Date    CAD (coronary artery disease)     Chronic pain disorder     neck    CPAP (continuous positive airway pressure) dependence     non compliant    Diabetes mellitus (HCC)     NIDDM    Esophagitis     GERD (gastroesophageal reflux disease)     Heart abnormality     upper ventricle hole     Hyperlipidemia     Hypertension     Sleep apnea     Spinal stenosis     neck radiates into R hand    TIA (transient ischemic attack)        Past Surgical History:   Procedure Laterality Date    CHOLECYSTECTOMY LAPAROSCOPIC N/A 12/4/2018    Procedure: CHOLECYSTECTOMY LAPAROSCOPIC;  Surgeon: Braxton Kennedy MD;  [facility]: [facility];  Service: General    ESOPHAGOGASTRODUODENOSCOPY N/A 12/31/2018    Procedure: ESOPHAGOGASTRODUODENOSCOPY (EGD); Surgeon: Raisa Montgomery MD;  Location: [facility]; Service: Gastroenterology    HERNIA REPAIR      IN ESOPHAGOGASTRODUODENOSCOPY TRANSORAL DIAGNOSTIC N/A 4/18/2019    Procedure: ESOPHAGOGASTRODUODENOSCOPY (EGD) with biopsy;  Surgeon: Raisa Montgomery MD;  Location: [facility];   Service: Gastroenterology    WISDOM TOOTH EXTRACTION         Family History Problem Relation Age of Onset    Hypertension Father      I have reviewed and agree with the history as documented  E-Cigarette/Vaping     E-Cigarette/Vaping Substances     Social History     Tobacco Use    Smoking status: Never Smoker    Smokeless tobacco: Never Used   Substance Use Topics    Alcohol use: Yes     Comment: social    Drug use: No        Review of Systems   Constitutional: Negative for fatigue and fever  HENT: Negative for congestion and trouble swallowing  Eyes: Negative for visual disturbance  Respiratory: Negative for cough, chest tightness and shortness of breath  Cardiovascular: Negative for chest pain  Gastrointestinal: Negative for abdominal pain, diarrhea, nausea and vomiting  Genitourinary: Negative for flank pain  Musculoskeletal: Negative for back pain and gait problem  Skin: Negative for rash and wound  Neurological: Positive for facial asymmetry and speech difficulty  Negative for syncope and headaches  Psychiatric/Behavioral: Negative for agitation  All other systems reviewed and are negative  Physical Exam  ED Triage Vitals   Temperature Pulse Respirations Blood Pressure SpO2   03/16/22 1225 03/16/22 1225 03/16/22 1225 03/16/22 1227 03/16/22 1227   97 6 °F (36 4 °C) 82 18 144/89 97 %      Temp Source Heart Rate Source Patient Position - Orthostatic VS BP Location FiO2 (%)   03/16/22 1225 03/16/22 1225 03/16/22 1227 03/16/22 1227 --   Oral Monitor Lying Right arm       Pain Score       03/16/22 1437       8             Orthostatic Vital Signs  Vitals:    03/16/22 1227 03/16/22 1445 03/16/22 1530 03/16/22 1630   BP: 144/89 125/85 132/84 113/68   Pulse:  79 67 68   Patient Position - Orthostatic VS: Lying Lying Lying Lying       Physical Exam  Vitals and nursing note reviewed  Constitutional:       Appearance: He is well-developed  He is not diaphoretic  HENT:      Head: Normocephalic and atraumatic        Right Ear: External ear normal       Left Ear: External ear normal    Eyes:      General:         Right eye: No discharge  Left eye: No discharge  Conjunctiva/sclera: Conjunctivae normal    Neck:      Vascular: No JVD  Trachea: No tracheal deviation  Cardiovascular:      Rate and Rhythm: Normal rate and regular rhythm  Heart sounds: Normal heart sounds  Pulmonary:      Effort: Pulmonary effort is normal       Breath sounds: Normal breath sounds  No wheezing  Abdominal:      General: There is no distension  Musculoskeletal:         General: Normal range of motion  Cervical back: Normal range of motion  Skin:     General: Skin is warm and dry  Neurological:      Mental Status: He is alert and oriented to person, place, and time  Cranial Nerves: Cranial nerve deficit present  Sensory: No sensory deficit  Motor: No weakness        Comments: Left facial droop, slight slurring of work  Sensation intact and equal bilaterally    Left upper and lower extremity weakness, sensation intact   Psychiatric:         Mood and Affect: Mood normal          Speech: Speech normal          Behavior: Behavior normal          ED Medications  Medications   oxyCODONE (ROXICODONE) IR tablet 5 mg (5 mg Oral Given 3/16/22 1437)   oxyCODONE (ROXICODONE) IR tablet 5 mg (5 mg Oral Given 3/16/22 1638)   oxyCODONE (ROXICODONE) IR tablet 5 mg (5 mg Oral Given 3/16/22 1849)       Diagnostic Studies  Results Reviewed     Procedure Component Value Units Date/Time    Comprehensive metabolic panel [[id number]]  (Abnormal) Collected: 03/16/22 1311    Lab Status: Final result Specimen: Blood from Arm, Right Updated: 03/16/22 1345     Sodium 140 mmol/L      Potassium 3 6 mmol/L      Chloride 108 mmol/L      CO2 27 mmol/L      ANION GAP 5 mmol/L      BUN 14 mg/dL      Creatinine 1 09 mg/dL      Glucose 126 mg/dL      Calcium 9 3 mg/dL      Corrected Calcium 9 8 mg/dL      AST 15 U/L      ALT 25 U/L      Alkaline Phosphatase 113 U/L      Total Protein 7 0 g/dL      Albumin 3 4 g/dL      Total Bilirubin 1 30 mg/dL      eGFR 74 ml/min/1 73sq m     Narrative:      [employer] guidelines for Chronic Kidney Disease (CKD):     Stage 1 with normal or high GFR (GFR > 90 mL/min/1 73 square meters)    Stage 2 Mild CKD (GFR = 60-89 mL/min/1 73 square meters)    Stage 3A Moderate CKD (GFR = 45-59 mL/min/1 73 square meters)    Stage 3B Moderate CKD (GFR = 30-44 mL/min/1 73 square meters)    Stage 4 Severe CKD (GFR = 15-29 mL/min/1 73 square meters)    Stage 5 End Stage CKD (GFR <15 mL/min/1 73 square meters)  Note: GFR calculation is accurate only with a steady state creatinine    CBC and differential [[id number]] Collected: 03/16/22 1311    Lab Status: Final result Specimen: Blood from Arm, Right Updated: 03/16/22 1332     WBC 6 65 Thousand/uL      RBC 4 93 Million/uL      Hemoglobin 15 0 g/dL      Hematocrit 42 9 %      MCV 87 fL      MCH 30 4 pg      MCHC 35 0 g/dL      RDW 12 9 %      MPV 10 1 fL      Platelets 289 Thousands/uL      nRBC 0 /100 WBCs      Neutrophils Relative 48 %      Immat GRANS % 0 %      Lymphocytes Relative 37 %      Monocytes Relative 8 %      Eosinophils Relative 6 %      Basophils Relative 1 %      Neutrophils Absolute 3 19 Thousands/µL      Immature Grans Absolute 0 01 Thousand/uL      Lymphocytes Absolute 2 46 Thousands/µL      Monocytes Absolute 0 52 Thousand/µL      Eosinophils Absolute 0 41 Thousand/µL      Basophils Absolute 0 06 Thousands/µL                  CT head wo contrast   Final Result by Ryan Weeks MD (03/16 1414)      No acute intracranial abnormality  Large old right-sided infarct posteriorly with severe underlying microangiopathic changes                    Workstation performed: [id number]               Procedures  Procedures      ED Course  ED Course as of 03/16/22 2302   Wed Mar 16, 2022   1449 Contacted neuro for ap recommendations   1543 Neuro resident recommendation: restart ASA and outpatient neuro follow up                             SBIRT 20yo+      Most Recent Value   SBIRT (22 yo +)    In order to provide better care to our patients, we are screening all of our patients for alcohol and drug use  Would it be okay to ask you these screening questions? No Filed at: 03/16/2022 1324                [facility]  Number of Diagnoses or Management Options  Facial paresthesia  Stroke-like symptoms  Diagnosis management comments: Discussed patient presentation with Neurology who recommended reinitiating aspirin at this time  No acute findings on head CT  Symptoms have been ongoing for 2 weeks  Will follow-up as an outpatient with Neurology strict return precautions advised      Disposition  Final diagnoses:   Facial paresthesia   Stroke-like symptoms     Time reflects when diagnosis was documented in both MDM as applicable and the Disposition within this note     Time User Action Codes Description Comment    3/16/2022  4:01 PM David Maharaj Add [R20 0] Left facial numbness     3/16/2022  4:01 PM David Maharaj Remove [R20 0] Left facial numbness     3/16/2022  4:01 PM David Maharaj Add [R20 2] Facial paresthesia     3/16/2022  4:01 PM Aishwarya Doctor Add [R29 90] Stroke-like symptoms       ED Disposition     ED Disposition Condition Date/Time Comment    Discharge Stable Wed Mar 16, 2022  4:01 PM Cadence Echevaria discharge to home/self care              Follow-up Information     Follow up With Specialties Details Why [facility] Neurology [facility] Neu[facility]   [address] [facility] ([place], [place]) Neurology [facility], [address], [place], [place], [address]          Discharge Medication List as of 3/16/2022  7:24 PM      START taking these medications    Details   aspirin 81 mg chewable tablet Chew 1 tablet (81 mg total) daily, Starting Wed 3/16/2022, Until Fri 4/15/2022, Normal CONTINUE these medications which have NOT CHANGED    Details   acetaminophen (TYLENOL) 325 mg tablet Take 3 tablets (975 mg total) by mouth every 8 (eight) hours as needed for mild pain, Starting Mon 12/27/2021, Print      amLODIPine (NORVASC) 10 mg tablet Take 10 mg by mouth daily, Historical Med      apixaban (ELIQUIS) 5 mg Take 5 mg by mouth 2 (two) times a day, Historical Med      aspirin (ECOTRIN LOW STRENGTH) 81 mg EC tablet Take 81 mg by mouth daily, Historical Med      atorvastatin (LIPITOR) 80 mg tablet Take 80 mg by mouth daily at bedtime  , Historical Med      carvedilol (COREG) 25 mg tablet Take 50 mg by mouth 2 (two) times a day with meals  , Historical Med      glyBURIDE (DIABETA) 5 mg tablet Take 5 mg by mouth daily with breakfast, Historical Med      isosorbide mononitrate (IMDUR) 60 mg 24 hr tablet Take 60 mg by mouth daily, Historical Med      lisinopril (ZESTRIL) 40 mg tablet Take 40 mg by mouth daily  , Historical Med      metFORMIN (GLUCOPHAGE) 500 mg tablet Take 1 tablet (500 mg total) by mouth 2 (two) times a day with meals, Starting Wed 12/29/2021, Normal      methocarbamol (Robaxin-750) 750 mg tablet Take 1 tablet (750 mg total) by mouth every 6 (six) hours, Starting Mon 12/27/2021, Print      mirtazapine (REMERON) 15 mg tablet Take 15 mg by mouth daily at bedtime, Historical Med      pantoprazole (PROTONIX) 40 mg tablet Take 1 tablet (40 mg total) by mouth 2 (two) times a day for 30 days, Starting Tue 1/1/2019, Until Thu 4/18/2019, Normal      senna-docusate sodium (SENOKOT S) 8 6-50 mg per tablet Take 1 tablet by mouth daily at bedtime, Starting Fri 12/24/2021, No Print      spironolactone (ALDACTONE) 50 mg tablet Take 50 mg by mouth daily  , Historical Med           No discharge procedures on file  PDMP Review       Value Time User    PDMP Reviewed  Yes 12/27/2021 11:32 AM BONILLA oYo           ED Provider  Attending physically available and evaluated Rufus Vital   LINDA managed the patient along with the ED Attending      Electronically Signed by         Bia Lackey DO  03/16/22 1858

## 2022-03-18 NOTE — ED ATTENDING ATTESTATION
3/16/2022  IKenroy MD, saw and evaluated the patient  I have discussed the patient with the resident/non-physician practitioner and agree with the resident's/non-physician practitioner's findings, Plan of Care, and MDM as documented in the resident's/non-physician practitioner's note, except where noted  All available labs and Radiology studies were reviewed  I was present for key portions of any procedure(s) performed by the resident/non-physician practitioner and I was immediately available to provide assistance  At this point I agree with the current assessment done in the Emergency Department  I have conducted an independent evaluation of this patient a history and physical is as follows:    ED Course     49-year-old male, currently in rehab for history of stroke resulting in left upper and left lower extremity deficits, presenting to the emergency department for a 2 week history of left facial weakness, left facial numbness, dysarthria  Patient reports that he had discussed with nursing at the facility where he resides the symptoms that he was having, and when physician saw him today, decision was made to send the patient to the emergency department  Patient denies any headache, change in vision  Patient is nonambulatory  Patient is unable to provide history about nature of previous strokes that were diagnosed in [place], as well as history of anticoagulant use  Ten systems reviewed and negative except as noted  On examination head is normocephalic atraumatic  Eyelids lashes normal   Conjunctivae are nonicteric  Mucous membranes are dry  Patient is noted to have mild left facial droop around the left mouth  Patient is able to fully close his eyes bilaterally  Neck is supple with full range of motion  Lungs are clear to auscultation bilaterally  Heart is regular rate rhythm with no murmurs rubs or gallops  Left upper extremity is contracted    Left lower extremity is in a mobile  Abdomen is nondistended, soft and nontender  Left hemiparesis  Labs Reviewed   COMPREHENSIVE METABOLIC PANEL - Abnormal       Result Value Ref Range Status    Sodium 140  136 - 145 mmol/L Final    Potassium 3 6  3 5 - 5 3 mmol/L Final    Chloride 108  100 - 108 mmol/L Final    CO2 27  21 - 32 mmol/L Final    ANION GAP 5  4 - 13 mmol/L Final    BUN 14  5 - 25 mg/dL Final    Creatinine 1 09  0 60 - 1 30 mg/dL Final    Comment: Standardized to IDMS reference method    Glucose 126  65 - 140 mg/dL Final    Comment: If the patient is fasting, the ADA then defines impaired fasting glucose as > 100 mg/dL and diabetes as > or equal to 123 mg/dL  Specimen collection should occur prior to Sulfasalazine administration due to the potential for falsely depressed results  Specimen collection should occur prior to Sulfapyridine administration due to the potential for falsely elevated results  Calcium 9 3  8 3 - 10 1 mg/dL Final    Corrected Calcium 9 8  8 3 - 10 1 mg/dL Final    AST 15  5 - 45 U/L Final    Comment: Specimen collection should occur prior to Sulfasalazine administration due to the potential for falsely depressed results  ALT 25  12 - 78 U/L Final    Comment: Specimen collection should occur prior to Sulfasalazine and/or Sulfapyridine administration due to the potential for falsely depressed results  Alkaline Phosphatase 113  46 - 116 U/L Final    Total Protein 7 0  6 4 - 8 2 g/dL Final    Albumin 3 4 (*) 3 5 - 5 0 g/dL Final    Total Bilirubin 1 30 (*) 0 20 - 1 00 mg/dL Final    Comment: Use of this assay is not recommended for patients undergoing treatment with eltrombopag due to the potential for falsely elevated results      eGFR 74  ml/min/1 73sq m Final    Narrative:     [place] guidelines for Chronic Kidney Disease (CKD):     Stage 1 with normal or high GFR (GFR > 90 mL/min/1 73 square meters)    Stage 2 Mild CKD (GFR = 60-89 mL/min/1 73 square meters)   Stage 3A Moderate CKD (GFR = 45-59 mL/min/1 73 square meters)    Stage 3B Moderate CKD (GFR = 30-44 mL/min/1 73 square meters)    Stage 4 Severe CKD (GFR = 15-29 mL/min/1 73 square meters)    Stage 5 End Stage CKD (GFR <15 mL/min/1 73 square meters)  Note: GFR calculation is accurate only with a steady state creatinine   CBC AND DIFFERENTIAL    WBC 6 65  4 31 - 10 16 Thousand/uL Final    RBC 4 93  3 88 - 5 62 Million/uL Final    Hemoglobin 15 0  12 0 - 17 0 g/dL Final    Hematocrit 42 9  36 5 - 49 3 % Final    MCV 87  82 - 98 fL Final    MCH 30 4  26 8 - 34 3 pg Final    MCHC 35 0  31 4 - 37 4 g/dL Final    RDW 12 9  11 6 - 15 1 % Final    MPV 10 1  8 9 - 12 7 fL Final    Platelets 323  287 - 390 Thousands/uL Final    nRBC 0  /100 WBCs Final    Neutrophils Relative 48  43 - 75 % Final    Immat GRANS % 0  0 - 2 % Final    Lymphocytes Relative 37  14 - 44 % Final    Monocytes Relative 8  4 - 12 % Final    Eosinophils Relative 6  0 - 6 % Final    Basophils Relative 1  0 - 1 % Final    Neutrophils Absolute 3 19  1 85 - 7 62 Thousands/µL Final    Immature Grans Absolute 0 01  0 00 - 0 20 Thousand/uL Final    Lymphocytes Absolute 2 46  0 60 - 4 47 Thousands/µL Final    Monocytes Absolute 0 52  0 17 - 1 22 Thousand/µL Final    Eosinophils Absolute 0 41  0 00 - 0 61 Thousand/µL Final    Basophils Absolute 0 06  0 00 - 0 10 Thousands/µL Final       CT head wo contrast   Final Result      No acute intracranial abnormality  Large old right-sided infarct posteriorly with severe underlying microangiopathic changes  Workstation performed: [id number]             Case was discussed with neurology to discuss starting 2nd antiplatelet agent  They recommended follow-up in the office      Critical Care Time  Procedures

## 2022-05-04 ENCOUNTER — OFFICE VISIT (OUTPATIENT)
Dept: HEMATOLOGY ONCOLOGY | Facility: CLINIC | Age: 57
End: 2022-05-04
Payer: COMMERCIAL

## 2022-05-04 VITALS
DIASTOLIC BLOOD PRESSURE: 62 MMHG | HEART RATE: 60 BPM | RESPIRATION RATE: 16 BRPM | OXYGEN SATURATION: 93 % | SYSTOLIC BLOOD PRESSURE: 100 MMHG | TEMPERATURE: 97 F

## 2022-05-04 DIAGNOSIS — I63.89 CEREBROVASCULAR ACCIDENT (CVA) DUE TO OTHER MECHANISM (HCC): ICD-10-CM

## 2022-05-04 DIAGNOSIS — K55.069 SUPERIOR MESENTERIC VEIN THROMBOSIS (HCC): Primary | ICD-10-CM

## 2022-05-04 PROCEDURE — 99245 OFF/OP CONSLTJ NEW/EST HI 55: CPT | Performed by: NURSE PRACTITIONER

## 2022-05-04 RX ORDER — DULOXETIN HYDROCHLORIDE 30 MG/1
30 CAPSULE, DELAYED RELEASE ORAL DAILY
COMMUNITY

## 2022-05-04 RX ORDER — BACLOFEN 5 MG/1
1 TABLET ORAL 3 TIMES DAILY
COMMUNITY

## 2022-05-04 RX ORDER — LIDOCAINE 4 G/G
PATCH TOPICAL
COMMUNITY

## 2022-05-04 RX ORDER — VIT E ACET/GLY/DIMETH/WATER
LOTION (ML) TOPICAL AS NEEDED
COMMUNITY

## 2022-05-04 RX ORDER — GABAPENTIN 300 MG/1
300 CAPSULE ORAL 3 TIMES DAILY
COMMUNITY

## 2022-05-04 NOTE — PROGRESS NOTES
Hematology/Oncology Outpatient Follow-up  Unknown Sachin 60 y o  male 1965 [id number]    Date:  5/4/2022      Assessment and Plan:  1  Superior mesenteric vein thrombosis ([address] )  Patient was found to have partial thrombosis of the SMV December 2021 when he was admitted with pancreatitis  Was likely provoked from the pancreatitis  Usual recommendation for patients with the situation without identifiable risk factors is to consider anticoagulation for 6 months; however individuals with risk factors long-term anticoagulation should be considered  Patient mentions that his Eliquis was discontinued several weeks ago due to possible bleeding on the brain  He seems to be taking aspirin 81 mg daily at present  He does seem to have PFO which can increase his risk of thrombosis/CVA which is being monitored by cardiology at [facility]  We will pursue additional hypercoagulable workup today/this week since he is off the anticoagulation and have him follow-up in about 4-6 weeks to review the findings  Will get in contact with his PCP/geritician it to clarify the reason for the Eliquis discontinuation  If he does have absolute contraindication to the anticoagulation will need to continue to hold  Addendum: Patient's PCP Vijaya Chase was kind enough to discuss patient's case over the telephone  She states that his Eliquis was discontinued several months ago as per neurology's recommendation since his MRI of the brain 07/2021 at [facility] showed microhemorrhages  Did contact his cardiology team who also states anticoagulation is on hold per Neurology  Awaiting call back from Neurology to confirm     - D-dimer, quantitative; Future  - Cardiolipin antibody; Future  - Beta-2 glycoprotein antibodies; Future  - Lupus anticoagulant; Future  - Factor 5 leiden; Future  - Prothrombin gene mutation; Future  - Protein S Antigen, Total; Future  - Protein S activity;  Future  - Protein C antigen, total; Future  - Protein C activity; Future  - JAK2 V617F,Ql,W/RFL Exons 12,13 and MPL W564,B100; Future  - CBC and differential; Future  - Comprehensive metabolic panel; Future    2  Cerebrovascular accident (CVA) due to other mechanism [facility])  - D-dimer, quantitative; Future  - Cardiolipin antibody; Future  - Beta-2 glycoprotein antibodies; Future  - Lupus anticoagulant; Future  - Factor 5 leiden; Future  - Prothrombin gene mutation; Future  - Protein S Antigen, Total; Future  - Protein S activity; Future  - Protein C antigen, total; Future  - Protein C activity; Future  - JAK2 V617F,Ql,W/RFL Exons 12,13 and MPL Q354,T215; Future  - CBC and differential; Future  - Comprehensive metabolic panel; Future      HPI:  Patient is a 78-year-old male who presents via litter from skilled nursing facility for hospital follow-up regarding his superior mesenteric vein thrombosis  Does not seem to be the greatest historian  He has past medical history of CAD, hypertension, hyperlipidemia diabetes, GERD, TIA/CVA with residual left-sided weakness, obstructive sleep apnea, depression, pancreatitis, chronic shoulder pain due to dislocated left shoulder and status post laparoscopic cholecystectomy  He also seems to have PFO as well as intra atrial septal aneurysm and is being monitored by Cardiology at [facility]  His neurology team is also at [facility]  He apparently was admitted to [facility] 12/16/2021 through 12/24/2021 with abdominal pain due to pancreatitis  He did have a CT scan of the abdomen and pelvis with contrast 12/16/2022 which showed:  IMPRESSION:  1  Moderate acute interstitial edematous pancreatitis as above complicated by partial thrombosis of the SMV  2   Small acute peripancreatic fluid collection in the mesentery measures 3 7 x 2 4 cm  3  Status post cholecystectomy with 2 small stones seen in the cystic duct remnant    4  Small bilateral pleural effusions with mild bibasilar compressive atelectasis  He was started on a heparin drip which was transitioned to Eliquis at discharge  He then re-presented to the emergency department 01/12/2022 again with abdominal pain  A repeat CT scan of the abdomen and pelvis with contrast was performed which did not make mention of thrombosis:  IMPRESSION:  Improving subacute pancreatitis with decreasing peripancreatic inflammatory changes and smaller peripancreatic fluid collection  No evidence of new acute abdominopelvic process  His most recent laboratory studies from 03/16/2022 showed normal CBC hemoglobin 15 0  Total bili slightly elevated 1 3, albumin 3 4 remaining metabolic panel is normal     Patient has no new complaints today  He denies any obvious bleeding from any site  Has chronic pain to his left shoulder due to a dislocated shoulder  He is weak on his entire left side from his prior stroke  He states that he has been having numbness to the left side of his face especially the cheek area  He does not seem to be taking the Eliquis anymore according to his MAR from the skilled nursing facility  Patient mentions that he believes it was discontinued a few weeks ago due to possible bleeding on the brain  ROS: Review of Systems   Constitutional: Positive for activity change  Negative for appetite change, chills, fatigue, fever and unexpected weight change  HENT: Positive for voice change (d/t prior CVA)  Negative for congestion, mouth sores, nosebleeds, sore throat and trouble swallowing  Eyes: Negative  Respiratory: Negative for cough, chest tightness and shortness of breath  Cardiovascular: Negative for chest pain, palpitations and leg swelling  Gastrointestinal: Negative for abdominal distention, abdominal pain, blood in stool, constipation, diarrhea, nausea and vomiting  Genitourinary: Negative for difficulty urinating, dysuria, frequency, hematuria and urgency     Musculoskeletal: Positive for arthralgias (left shoulder) and gait problem (chair/bed bound)  Negative for back pain, joint swelling and myalgias  Skin: Negative for color change, pallor and rash  Neurological: Positive for weakness (left side)  Negative for dizziness, light-headedness, numbness and headaches  Hematological: Negative for adenopathy  Does not bruise/bleed easily  Psychiatric/Behavioral: Negative for dysphoric mood and sleep disturbance  The patient is not nervous/anxious  Past Medical History:   Diagnosis Date    CAD (coronary artery disease)     Chronic pain disorder     neck    CPAP (continuous positive airway pressure) dependence     non compliant    Diabetes mellitus (HCC)     NIDDM    Esophagitis     GERD (gastroesophageal reflux disease)     Heart abnormality     upper ventricle hole     Hyperlipidemia     Hypertension     Sleep apnea     Spinal stenosis     neck radiates into R hand    TIA (transient ischemic attack)        Past Surgical History:   Procedure Laterality Date    CHOLECYSTECTOMY LAPAROSCOPIC N/A 12/4/2018    Procedure: CHOLECYSTECTOMY LAPAROSCOPIC;  Surgeon: Abby Francis MD;  [facility];  Service: General    ESOPHAGOGASTRODUODENOSCOPY N/A 12/31/2018    Procedure: ESOPHAGOGASTRODUODENOSCOPY (EGD); Surgeon: Sindhu Otero MD;  Location: [facility]; Service: Gastroenterology    HERNIA REPAIR      UT ESOPHAGOGASTRODUODENOSCOPY TRANSORAL DIAGNOSTIC N/A 4/18/2019    Procedure: ESOPHAGOGASTRODUODENOSCOPY (EGD) with biopsy;  Surgeon: Sindhu Otero MD;  [facility]: [facility]; Service: Gastroenterology    WISDOM TOOTH EXTRACTION         Social History     Socioeconomic History    Marital status: Single     Spouse name: None    Number of children: None    Years of education: None    Highest education level: None   Occupational History    None   Tobacco Use    Smoking status: Never Smoker    Smokeless tobacco: Never Used   Substance and Sexual Activity    Alcohol use:  Yes Comment: social    Drug use: No    Sexual activity: None   Other Topics Concern    None   Social History Narrative    None     Social Determinants of Health     Financial Resource Strain: Not on file   Food Insecurity: Not on file   Transportation Needs: Not on file   Physical Activity: Not on file   Stress: Not on file   Social Connections: Not on file   Intimate Partner Violence: Not on file   Housing Stability: Not on file       Family History   Problem Relation Age of Onset    Hypertension Father        No Known Allergies      Current Outpatient Medications:     acetaminophen (TYLENOL) 325 mg tablet, Take 3 tablets (975 mg total) by mouth every 8 (eight) hours as needed for mild pain, Disp: 30 tablet, Rfl: 0    amLODIPine (NORVASC) 10 mg tablet, Take 10 mg by mouth daily, Disp: , Rfl:     apixaban (ELIQUIS) 5 mg, Take 5 mg by mouth 2 (two) times a day, Disp: , Rfl:     aspirin (ECOTRIN LOW STRENGTH) 81 mg EC tablet, Take 81 mg by mouth daily, Disp: , Rfl:     aspirin 81 mg chewable tablet, Chew 1 tablet (81 mg total) daily, Disp: 30 tablet, Rfl: 0    atorvastatin (LIPITOR) 80 mg tablet, Take 80 mg by mouth daily at bedtime  , Disp: , Rfl:     carvedilol (COREG) 25 mg tablet, Take 50 mg by mouth 2 (two) times a day with meals  , Disp: , Rfl:     glyBURIDE (DIABETA) 5 mg tablet, Take 5 mg by mouth daily with breakfast, Disp: , Rfl:     isosorbide mononitrate (IMDUR) 60 mg 24 hr tablet, Take 60 mg by mouth daily, Disp: , Rfl:     lisinopril (ZESTRIL) 40 mg tablet, Take 40 mg by mouth daily  , Disp: , Rfl:     metFORMIN (GLUCOPHAGE) 500 mg tablet, Take 1 tablet (500 mg total) by mouth 2 (two) times a day with meals, Disp: 30 tablet, Rfl: 0    methocarbamol (Robaxin-750) 750 mg tablet, Take 1 tablet (750 mg total) by mouth every 6 (six) hours, Disp: 40 tablet, Rfl: 0    mirtazapine (REMERON) 15 mg tablet, Take 15 mg by mouth daily at bedtime, Disp: , Rfl:     pantoprazole (PROTONIX) 40 mg tablet, Take 1 tablet (40 mg total) by mouth 2 (two) times a day for 30 days, Disp: 60 tablet, Rfl: 2    senna-docusate sodium (SENOKOT S) 8 6-50 mg per tablet, Take 1 tablet by mouth daily at bedtime, Disp: 10 tablet, Rfl: 0    spironolactone (ALDACTONE) 50 mg tablet, Take 50 mg by mouth daily  , Disp: , Rfl:       Physical Exam:  /62 (BP Location: Left arm, Patient Position: Sitting, Cuff Size: Adult)   Pulse 60   Temp (!) 97 °F (36 1 °C) (Tympanic)   Resp 16   SpO2 93%     Physical Exam  Vitals (PE somewhat limited as patient is strapped into litter) reviewed  Constitutional:       General: He is not in acute distress  Appearance: He is well-developed  He is not diaphoretic  HENT:      Head: Normocephalic  Eyes:      General: Lids are normal  No scleral icterus  Conjunctiva/sclera: Conjunctivae normal    Neck:      Thyroid: No thyromegaly  Cardiovascular:      Rate and Rhythm: Normal rate and regular rhythm  Heart sounds: Normal heart sounds  No murmur heard  Pulmonary:      Effort: Pulmonary effort is normal  No respiratory distress  Breath sounds: Normal breath sounds  Comments:  Auscultated anteriorly  Chest:   Breasts:      Right: No axillary adenopathy  Left: No axillary adenopathy  Abdominal:      General: There is no distension  Palpations: Abdomen is soft  There is no hepatomegaly or splenomegaly  Tenderness: There is no abdominal tenderness  Musculoskeletal:      Cervical back: Normal range of motion and neck supple  Comments: Left sided weakness   Lymphadenopathy:      Cervical: No cervical adenopathy  Upper Body:      Right upper body: No axillary adenopathy  Left upper body: No axillary adenopathy  Skin:     General: Skin is warm and dry  Coloration: Skin is pale  Findings: No erythema or rash  Neurological:      General: No focal deficit present        Mental Status: He is alert and oriented to person, place, and time  Psychiatric:         Mood and Affect: Mood and affect normal          Behavior: Behavior normal  Behavior is cooperative  Thought Content: Thought content normal          Judgment: Judgment normal            Labs:  Lab Results   Component Value Date    WBC 6 65 03/16/2022    HGB 15 0 03/16/2022    HCT 42 9 03/16/2022    MCV 87 03/16/2022     03/16/2022     Lab Results   Component Value Date     05/24/2015    K 3 6 03/16/2022     03/16/2022    CO2 27 03/16/2022    ANIONGAP 3 (L) 05/24/2015    BUN 14 03/16/2022    CREATININE 1 09 03/16/2022    GLUCOSE 131 05/24/2015    GLUF 108 (H) 12/17/2021    CALCIUM 9 3 03/16/2022    CORRECTEDCA 9 8 03/16/2022    AST 15 03/16/2022    ALT 25 03/16/2022    ALKPHOS 113 03/16/2022    PROT 6 5 05/24/2015    BILITOT 0 5 05/24/2015    EGFR 74 03/16/2022       Patient voiced understanding and agreement in the above discussion  Aware to contact our office with questions/symptoms in the interim  This note has been generated by voice recognition software system  Therefore, there may be spelling, grammar, and or syntax errors  Please contact if questions arise

## 2022-05-26 ENCOUNTER — TELEPHONE (OUTPATIENT)
Dept: HEMATOLOGY ONCOLOGY | Facility: CLINIC | Age: 57
End: 2022-05-26

## 2022-05-26 NOTE — TELEPHONE ENCOUNTER
Left message for patient to have labs completed prior to appointment with Dr Jorge Velasquez on 6/3/22

## 2022-06-03 ENCOUNTER — TELEPHONE (OUTPATIENT)
Dept: HEMATOLOGY ONCOLOGY | Facility: CLINIC | Age: 57
End: 2022-06-03

## 2024-12-31 NOTE — PHYSICIAN ADVISOR
Current patient class: Observation  The patient is currently on Hospital Day: 4 at [address] [place] [address]        The patient was admitted to the hospital  on N/A at N/A for the following diagnosis:  Cholecystitis [K81 [id number]]     After review of the relevant documentation, labs, vital signs and test results, the patient is most appropriate for OBSERVATION STATUS  The patient was admitted to the hospital without an expected length of stay of at least 2 midnights  Given that the patient is subject to the 2 midnight benchmark as a CMS patient, they are appropriate for observation status at this time  Should the patient remain hospitalized for a second midnight the status should be reevaluated for medical necessity  Rationale is as follows: The patient is a 52 yrs   Male who presented to the ED at 12/3/2018 12:58 PM with a chief complaint of Vomiting (patient with vomiting at night, a lot of indegestion, pain under the right breast   Private for surgery )     Given the expected discharge for this elective procedure, patient is appropriate to remain in observation status  The patient present time has no change in the expected postoperative course  Patient is currently stable and is feeling well      The patients vitals on arrival were ED Triage Vitals   Temperature Pulse Respirations Blood Pressure SpO2   12/03/18 1255 12/03/18 1255 12/03/18 1255 12/03/18 1255 12/03/18 1255   (!) 97 4 °F (36 3 °C) 79 18 (!) 195/134 96 %      Temp Source Heart Rate Source Patient Position - Orthostatic VS BP Location FiO2 (%)   12/03/18 1255 12/03/18 1255 12/03/18 1255 12/03/18 1255 --   Oral Monitor Lying Right arm       Pain Score       12/03/18 1258       7           Past Medical History:   Diagnosis Date    CAD (coronary artery disease)     Diabetes mellitus ([facility] [address] )     Hypertension      Past Surgical History:   Procedure Laterality Date    CHOLECYSTECTOMY LAPAROSCOPIC N/A 12/4/2018    Procedure: CHOLECYSTECTOMY LAPAROSCOPIC;  Surgeon: Tera Easley MD;  [facility];  Service: General    HERNIA REPAIR             Consults have been placed to:   None    Vitals:    12/05/18 0513 12/05/18 0724 12/05/18 1554 12/06/18 0001   BP: 155/84 141/95 143/88 158/95   BP Location: Right arm Left arm Left arm Left arm   Pulse: 85 98 93 73   Resp: 18 18 20 18   Temp: 98 9 °F (37 2 °C) 99 °F (37 2 °C) 99 5 °F (37 5 °C) 98 4 °F (36 9 °C)   TempSrc: Oral Oral Oral Oral   SpO2: 92% 90% (!) 88% 93%   Weight:       Height:           Most recent labs:    Recent Labs      12/03/18   1334  12/04/18   0453  12/05/18   0617   WBC  6 18  6 74  9 07   HGB  16 7  14 3  15 4   HCT  47 8  41 7  44 8   PLT  276  237  265   K  4 0  3 7   --    CALCIUM  8 7  8 2*   --    BUN  16  14   --    CREATININE  1 17  1 24   --    LIPASE  219   --    --    AST  14  9   --    ALT  32  24   --    ALKPHOS  96  79   --        Scheduled Meds:  Current Facility-Administered Medications:  acetaminophen 975 mg Oral Q8H [address] Rivas Roblero PA-C   albuterol 1 puff Inhalation PRN BONILLA You   amLODIPine 10 mg Oral Daily BONILLA Minaya   apixaban 5 mg Oral BID Fayetta Elmdale   aspirin 81 mg Oral Daily Freya Bhatt   atorvastatin 40 mg Oral HS BONILLA Minaya   carvedilol 12 5 mg Oral BID With Meals BONILLA You   HYDROmorphone 1 mg Intravenous Q3H PRN Xiomara Bhatt   insulin lispro 2-12 Units Subcutaneous TID AC Freya Bhatt   isosorbide mononitrate 60 mg Oral Daily BONILLA You   lisinopril 40 mg Oral Q12H [address] BONILLA Minaya   methocarbamol 750 mg Oral Q6H [address] Rivas Roblero PA-C   mirtazapine 15 mg Oral HS BONILLA You   oxyCODONE 10 mg Oral Q4H PRN BONILLA You   oxyCODONE 5 mg Oral Q4H PRN BONILLA You   pantoprazole 40 mg Intravenous Q24H [address] BONILLA Sanches   spironolactone 25 mg Oral Daily BONILLA Sterling     Continuous Infusions:   PRN Meds: albuterol   HYDROmorphone    oxyCODONE    oxyCODONE    Surgical procedures (if appropriate):  Procedure(s):  CHOLECYSTECTOMY LAPAROSCOPIC Introduction Text (Please End With A Colon): The following procedure was deferred: Procedure To Be Performed At Next Visit: Biopsy by shave method Size Of Lesion In Cm (Optional): 0 Detail Level: Detailed

## (undated) DEVICE — 5 MM CURVED DISSECTORS WITH MONOPOLAR CAUTERY: Brand: ENDOPATH

## (undated) DEVICE — SUT MONOCRYL 4-0 PS-2 18 IN Y496G

## (undated) DEVICE — NEEDLE 25G X 1 1/2

## (undated) DEVICE — TISSUE RETRIEVAL SYSTEM: Brand: INZII RETRIEVAL SYSTEM

## (undated) DEVICE — GLOVE SRG BIOGEL 7.5

## (undated) DEVICE — TROCAR: Brand: KII FIOS FIRST ENTRY

## (undated) DEVICE — TROCAR: Brand: KII SLEEVE

## (undated) DEVICE — SINGLE-USE BIOPSY FORCEPS: Brand: RADIAL JAW 4

## (undated) DEVICE — ADHESIVE SKN CLSR HISTOACRYL FLEX 0.5ML LF

## (undated) DEVICE — PACK PBDS LAP CHOLE RF

## (undated) DEVICE — SUT VICRYL 0 UR-6 27 IN J603H

## (undated) DEVICE — ELECTRODE LAP J HOOK SPLIT STEM E-Z CLEAN 33CM -0021S

## (undated) DEVICE — SURGICEL 2 X 3

## (undated) DEVICE — 3000CC GUARDIAN II: Brand: GUARDIAN

## (undated) DEVICE — ENDOPATH 5MM ENDOSCOPIC BLUNT TIP DISSECTORS (12 POUCHES CONTAINING 3 DISSECTORS EACH): Brand: ENDOPATH

## (undated) DEVICE — LIGAMAX 5 MM ENDOSCOPIC MULTIPLE CLIP APPLIER: Brand: LIGAMAX

## (undated) DEVICE — INTENDED FOR TISSUE SEPARATION, AND OTHER PROCEDURES THAT REQUIRE A SHARP SURGICAL BLADE TO PUNCTURE OR CUT.: Brand: BARD-PARKER SAFETY BLADES SIZE 11, STERILE

## (undated) DEVICE — CHLORAPREP HI-LITE 26ML ORANGE